# Patient Record
Sex: MALE | Race: WHITE | NOT HISPANIC OR LATINO | Employment: OTHER | URBAN - METROPOLITAN AREA
[De-identification: names, ages, dates, MRNs, and addresses within clinical notes are randomized per-mention and may not be internally consistent; named-entity substitution may affect disease eponyms.]

---

## 2017-01-05 ENCOUNTER — OFFICE VISIT (OUTPATIENT)
Dept: SURGERY | Facility: CLINIC | Age: 52
End: 2017-01-05

## 2017-01-05 VITALS
HEART RATE: 90 BPM | OXYGEN SATURATION: 97 % | BODY MASS INDEX: 28.37 KG/M2 | HEIGHT: 75 IN | SYSTOLIC BLOOD PRESSURE: 128 MMHG | WEIGHT: 228.2 LBS | DIASTOLIC BLOOD PRESSURE: 82 MMHG

## 2017-01-05 DIAGNOSIS — K42.9 UMBILICAL HERNIA WITHOUT OBSTRUCTION AND WITHOUT GANGRENE: Primary | ICD-10-CM

## 2017-01-05 PROCEDURE — 99242 OFF/OP CONSLTJ NEW/EST SF 20: CPT | Performed by: SURGERY

## 2017-01-05 RX ORDER — CEFAZOLIN SODIUM 2 G/100ML
2 INJECTION, SOLUTION INTRAVENOUS ONCE
Status: CANCELLED | OUTPATIENT
Start: 2017-01-05 | End: 2017-01-05

## 2017-01-05 NOTE — MR AVS SNAPSHOT
Zachary Gerard   1/5/2017 2:20 PM   Office Visit    Dept Phone:  [phone]   Encounter #:  [id number]    Provider:  Fernando Montes Jr., MD   Department:  [facility] GENERAL SURGERY                Your Full Care Plan              Your Updated Medication List          This list is accurate as of: 1/5/17  3:20 PM.  Always use your most recent med list.                allopurinol 300 MG tablet   Commonly known as:  ZYLOPRIM   Take 1 tablet by mouth Daily.       * amitriptyline 100 MG tablet   Commonly known as:  ELAVIL   Take 1 tablet by mouth every day at bedtime.       * amitriptyline 25 MG tablet   Commonly known as:  ELAVIL   Take 1 tablet by mouth daily.       cyclobenzaprine 5 MG tablet   Commonly known as:  FLEXERIL   Take 1 tablet by mouth Daily.       enalapril 2.5 MG tablet   Commonly known as:  VASOTEC   Take 1 tablet by mouth Daily.       * Notice:  This list has 2 medication(s) that are the same as other medications prescribed for you. Read the directions carefully, and ask your doctor or other care provider to review them with you.            We Performed the Following     Case Request     Follow anesthesia standing orders.     Obtain informed consent       You Were Diagnosed With        Codes Comments    Umbilical hernia without obstruction and without gangrene    -  Primary ICD-10-CM: K42.9  ICD-9-CM: 553.1       Instructions     None    Patient Instructions History      Upcoming Appointments     Visit Type Date Time Department    NEW PATIENT 1/5/2017  2:20 PM [facility]      [employer] Signup     [facility] [employer] allows you to send messages to your doctor, view your test results, renew your prescriptions, schedule appointments, and more. To sign up, go to [employer] and click on the Sign Up Now link in the New User? box. Enter your [employer] Activation Code exactly as it appears below along with the last four digits of your Social  "Security Number and your Date of Birth () to complete the sign-up process. If you do not sign up before the expiration date, you must request a new code.    [employer] Activation Code: [email or web address]  Expires: 2017  3:20 PM    If you have questions, you can email [email or web address] or call [phone] to talk to our [employer] staff. Remember, [employer] is NOT to be used for urgent needs. For medical emergencies, dial 911.               Other Info from Your Visit           Allergies     No Known Allergies      Vital Signs     Blood Pressure Pulse Height Weight Oxygen Saturation Body Mass Index    128/82 (BP Location: Left arm, Patient Position: Sitting, Cuff Size: Adult) 90 75\" (190.5 cm) 228 lb 3.2 oz (104 kg) 97% 28.52 kg/m2    Smoking Status                   Former Smoker           Problems and Diagnoses Noted     Abdominal hernia        "

## 2017-01-05 NOTE — PROGRESS NOTES
Subjective   Zachary Gerard is a 51 y.o. male who presents to the office in surgical consultation from Olvin Cotter MD for an umbilical hernia.    History of Present Illness     The patient has had a long-standing umbilical hernia that has recently gotten larger and increasingly symptomatic.  He has frequent symptoms of pain.  He has had no change in his bowel or bladder function.    Review of Systems   Constitutional: Negative for activity change, appetite change, fatigue and fever.   Respiratory: Negative for chest tightness and shortness of breath.    Cardiovascular: Negative for chest pain and palpitations.   Gastrointestinal: Positive for abdominal pain. Negative for blood in stool, constipation, diarrhea, nausea and vomiting.   Genitourinary: Negative for dysuria and flank pain.   Neurological: Negative for dizziness and light-headedness.   Hematological: Negative for adenopathy. Does not bruise/bleed easily.   Psychiatric/Behavioral: Negative for agitation and confusion.     Past Medical History   Diagnosis Date   • Arthritis    • Diverticulosis    • Hiatal hernia    • Hypertension    • IBS (irritable bowel syndrome)      Past Surgical History   Procedure Laterality Date   • Hernia repair     • Extensor tendon of forearm / wrist repair       Family History   Problem Relation Age of Onset   • No Known Problems Mother    • No Known Problems Father      Social History     Social History   • Marital status:      Spouse name: N/A   • Number of children: N/A   • Years of education: N/A     Occupational History   • Not on file.     Social History Main Topics   • Smoking status: Former Smoker   • Smokeless tobacco: Never Used   • Alcohol use Yes   • Drug use: No   • Sexual activity: Not on file     Other Topics Concern   • Not on file     Social History Narrative       Objective   Physical Exam   Constitutional: He is oriented to person, place, and time. He appears well-developed and well-nourished.   Non-toxic appearance.   Eyes: EOM are normal. No scleral icterus.   Pulmonary/Chest: Effort normal. No respiratory distress.   Abdominal: Soft. Normal appearance. There is no tenderness. A hernia is present. Hernia confirmed positive in the ventral area (moderately sized reducible umbilical hernia).   Neurological: He is alert and oriented to person, place, and time.   Skin: Skin is warm and dry.   Psychiatric: He has a normal mood and affect. His behavior is normal. Judgment and thought content normal.       Assessment/Plan       The encounter diagnosis was Umbilical hernia without obstruction and without gangrene.    The patient has a symptomatic umbilical hernia with a defect large enough to permit small bowel.  He has been scheduled for an umbilical hernia repair.  The patient understands the indications, alternatives, risks, and benefits of the procedure and wishes to proceed.

## 2017-01-16 ENCOUNTER — ANESTHESIA EVENT (OUTPATIENT)
Dept: PERIOP | Facility: HOSPITAL | Age: 52
End: 2017-01-16

## 2017-01-16 ENCOUNTER — ANESTHESIA (OUTPATIENT)
Dept: PERIOP | Facility: HOSPITAL | Age: 52
End: 2017-01-16

## 2017-01-16 ENCOUNTER — HOSPITAL ENCOUNTER (OUTPATIENT)
Facility: HOSPITAL | Age: 52
Setting detail: HOSPITAL OUTPATIENT SURGERY
Discharge: HOME | End: 2017-01-16
Attending: SURGERY | Admitting: SURGERY

## 2017-01-16 VITALS
BODY MASS INDEX: 28.1 KG/M2 | HEIGHT: 75 IN | RESPIRATION RATE: 16 BRPM | TEMPERATURE: 98 F | OXYGEN SATURATION: 92 % | SYSTOLIC BLOOD PRESSURE: 172 MMHG | HEART RATE: 71 BPM | WEIGHT: 226 LBS | DIASTOLIC BLOOD PRESSURE: 85 MMHG

## 2017-01-16 DIAGNOSIS — K42.9 UMBILICAL HERNIA WITHOUT OBSTRUCTION AND WITHOUT GANGRENE: ICD-10-CM

## 2017-01-16 LAB
ALBUMIN SERPL-MCNC: 4.1 G/DL (ref 3.5–5.2)
ALBUMIN/GLOB SERPL: 1.7 G/DL
ALP SERPL-CCNC: 78 U/L (ref 39–117)
ALT SERPL W P-5'-P-CCNC: 20 U/L (ref 1–41)
ANION GAP SERPL CALCULATED.3IONS-SCNC: 13.9 MMOL/L
AST SERPL-CCNC: 15 U/L (ref 1–40)
BASOPHILS # BLD AUTO: 0.01 10*3/MM3 (ref 0–0.2)
BASOPHILS NFR BLD AUTO: 0.1 % (ref 0–1.5)
BILIRUB SERPL-MCNC: 0.6 MG/DL (ref 0.1–1.2)
BUN BLD-MCNC: 14 MG/DL (ref 6–20)
BUN/CREAT SERPL: 16.1 (ref 7–25)
CALCIUM SPEC-SCNC: 9.4 MG/DL (ref 8.6–10.5)
CHLORIDE SERPL-SCNC: 101 MMOL/L (ref 98–107)
CHOLEST SERPL-MCNC: 145 MG/DL (ref 0–200)
CO2 SERPL-SCNC: 24.1 MMOL/L (ref 22–29)
CREAT BLD-MCNC: 0.87 MG/DL (ref 0.76–1.27)
DEPRECATED RDW RBC AUTO: 47 FL (ref 37–54)
EOSINOPHIL # BLD AUTO: 0.15 10*3/MM3 (ref 0–0.7)
EOSINOPHIL NFR BLD AUTO: 2 % (ref 0.3–6.2)
ERYTHROCYTE [DISTWIDTH] IN BLOOD BY AUTOMATED COUNT: 13.1 % (ref 11.5–14.5)
GFR SERPL CREATININE-BSD FRML MDRD: 92 ML/MIN/1.73
GLOBULIN UR ELPH-MCNC: 2.4 GM/DL
GLUCOSE BLD-MCNC: 107 MG/DL (ref 65–99)
HCT VFR BLD AUTO: 45.5 % (ref 40.4–52.2)
HDLC SERPL-MCNC: 41 MG/DL (ref 40–60)
HGB BLD-MCNC: 15.2 G/DL (ref 13.7–17.6)
IMM GRANULOCYTES # BLD: 0 10*3/MM3 (ref 0–0.03)
IMM GRANULOCYTES NFR BLD: 0 % (ref 0–0.5)
LDLC SERPL CALC-MCNC: 65 MG/DL (ref 0–100)
LDLC/HDLC SERPL: 1.58 {RATIO}
LYMPHOCYTES # BLD AUTO: 1.91 10*3/MM3 (ref 0.9–4.8)
LYMPHOCYTES NFR BLD AUTO: 26 % (ref 19.6–45.3)
MCH RBC QN AUTO: 33.4 PG (ref 27–32.7)
MCHC RBC AUTO-ENTMCNC: 33.4 G/DL (ref 32.6–36.4)
MCV RBC AUTO: 100 FL (ref 79.8–96.2)
MONOCYTES # BLD AUTO: 0.39 10*3/MM3 (ref 0.2–1.2)
MONOCYTES NFR BLD AUTO: 5.3 % (ref 5–12)
NEUTROPHILS # BLD AUTO: 4.9 10*3/MM3 (ref 1.9–8.1)
NEUTROPHILS NFR BLD AUTO: 66.6 % (ref 42.7–76)
PLATELET # BLD AUTO: 168 10*3/MM3 (ref 140–500)
PMV BLD AUTO: 10 FL (ref 6–12)
POTASSIUM BLD-SCNC: 4.7 MMOL/L (ref 3.5–5.2)
PROT SERPL-MCNC: 6.5 G/DL (ref 6–8.5)
RBC # BLD AUTO: 4.55 10*6/MM3 (ref 4.6–6)
SODIUM BLD-SCNC: 139 MMOL/L (ref 136–145)
TRIGL SERPL-MCNC: 197 MG/DL (ref 0–150)
URATE SERPL-MCNC: 5.2 MG/DL (ref 3.4–7)
VLDLC SERPL-MCNC: 39.4 MG/DL (ref 5–40)
WBC NRBC COR # BLD: 7.36 10*3/MM3 (ref 4.5–10.7)

## 2017-01-16 PROCEDURE — 25010000002 MIDAZOLAM PER 1 MG: Performed by: ANESTHESIOLOGY

## 2017-01-16 PROCEDURE — 84550 ASSAY OF BLOOD/URIC ACID: CPT | Performed by: INTERNAL MEDICINE

## 2017-01-16 PROCEDURE — C1781 MESH (IMPLANTABLE): HCPCS | Performed by: SURGERY

## 2017-01-16 PROCEDURE — 25010000002 HYDRALAZINE PER 20 MG: Performed by: NURSE ANESTHETIST, CERTIFIED REGISTERED

## 2017-01-16 PROCEDURE — 25010000002 KETOROLAC TROMETHAMINE PER 15 MG: Performed by: NURSE ANESTHETIST, CERTIFIED REGISTERED

## 2017-01-16 PROCEDURE — 25010000002 PROPOFOL 10 MG/ML EMULSION: Performed by: NURSE ANESTHETIST, CERTIFIED REGISTERED

## 2017-01-16 PROCEDURE — 93005 ELECTROCARDIOGRAM TRACING: CPT | Performed by: SURGERY

## 2017-01-16 PROCEDURE — 93010 ELECTROCARDIOGRAM REPORT: CPT | Performed by: INTERNAL MEDICINE

## 2017-01-16 PROCEDURE — 49585 PR REPAIR UMBILICAL HERN,5+Y/O,REDUC: CPT | Performed by: SURGERY

## 2017-01-16 PROCEDURE — 80053 COMPREHEN METABOLIC PANEL: CPT | Performed by: INTERNAL MEDICINE

## 2017-01-16 PROCEDURE — 25010000002 DEXAMETHASONE PER 1 MG: Performed by: NURSE ANESTHETIST, CERTIFIED REGISTERED

## 2017-01-16 PROCEDURE — 85025 COMPLETE CBC W/AUTO DIFF WBC: CPT | Performed by: SURGERY

## 2017-01-16 PROCEDURE — 25010000002 FENTANYL CITRATE (PF) 100 MCG/2ML SOLUTION: Performed by: NURSE ANESTHETIST, CERTIFIED REGISTERED

## 2017-01-16 PROCEDURE — 25010000003 CEFAZOLIN IN DEXTROSE 2-4 GM/100ML-% SOLUTION: Performed by: SURGERY

## 2017-01-16 PROCEDURE — 25010000002 HYDROMORPHONE PER 4 MG: Performed by: NURSE ANESTHETIST, CERTIFIED REGISTERED

## 2017-01-16 PROCEDURE — 80061 LIPID PANEL: CPT | Performed by: INTERNAL MEDICINE

## 2017-01-16 PROCEDURE — 25010000002 ONDANSETRON PER 1 MG: Performed by: NURSE ANESTHETIST, CERTIFIED REGISTERED

## 2017-01-16 DEVICE — VENTRALEX ST HERNIA PATCH
Type: IMPLANTABLE DEVICE | Status: FUNCTIONAL
Brand: VENTRALEX ST HERNIA PATCH

## 2017-01-16 RX ORDER — SODIUM CHLORIDE 0.9 % (FLUSH) 0.9 %
1-10 SYRINGE (ML) INJECTION AS NEEDED
Status: DISCONTINUED | OUTPATIENT
Start: 2017-01-16 | End: 2017-01-16 | Stop reason: HOSPADM

## 2017-01-16 RX ORDER — DEXAMETHASONE SODIUM PHOSPHATE 10 MG/ML
INJECTION INTRAMUSCULAR; INTRAVENOUS AS NEEDED
Status: DISCONTINUED | OUTPATIENT
Start: 2017-01-16 | End: 2017-01-16 | Stop reason: SURG

## 2017-01-16 RX ORDER — FLUMAZENIL 0.1 MG/ML
0.2 INJECTION INTRAVENOUS AS NEEDED
Status: DISCONTINUED | OUTPATIENT
Start: 2017-01-16 | End: 2017-01-16 | Stop reason: HOSPADM

## 2017-01-16 RX ORDER — SODIUM CHLORIDE, SODIUM LACTATE, POTASSIUM CHLORIDE, CALCIUM CHLORIDE 600; 310; 30; 20 MG/100ML; MG/100ML; MG/100ML; MG/100ML
9 INJECTION, SOLUTION INTRAVENOUS CONTINUOUS
Status: DISCONTINUED | OUTPATIENT
Start: 2017-01-16 | End: 2017-01-16 | Stop reason: HOSPADM

## 2017-01-16 RX ORDER — PROMETHAZINE HYDROCHLORIDE 25 MG/ML
12.5 INJECTION, SOLUTION INTRAMUSCULAR; INTRAVENOUS ONCE AS NEEDED
Status: DISCONTINUED | OUTPATIENT
Start: 2017-01-16 | End: 2017-01-16 | Stop reason: HOSPADM

## 2017-01-16 RX ORDER — BUPIVACAINE HYDROCHLORIDE AND EPINEPHRINE 5; 5 MG/ML; UG/ML
INJECTION, SOLUTION PERINEURAL AS NEEDED
Status: DISCONTINUED | OUTPATIENT
Start: 2017-01-16 | End: 2017-01-16 | Stop reason: HOSPADM

## 2017-01-16 RX ORDER — PROMETHAZINE HYDROCHLORIDE 25 MG/1
12.5 TABLET ORAL ONCE AS NEEDED
Status: DISCONTINUED | OUTPATIENT
Start: 2017-01-16 | End: 2017-01-16 | Stop reason: HOSPADM

## 2017-01-16 RX ORDER — FAMOTIDINE 10 MG/ML
20 INJECTION, SOLUTION INTRAVENOUS ONCE
Status: COMPLETED | OUTPATIENT
Start: 2017-01-16 | End: 2017-01-16

## 2017-01-16 RX ORDER — HYDRALAZINE HYDROCHLORIDE 20 MG/ML
5 INJECTION INTRAMUSCULAR; INTRAVENOUS
Status: DISCONTINUED | OUTPATIENT
Start: 2017-01-16 | End: 2017-01-16 | Stop reason: HOSPADM

## 2017-01-16 RX ORDER — FENTANYL CITRATE 50 UG/ML
INJECTION, SOLUTION INTRAMUSCULAR; INTRAVENOUS AS NEEDED
Status: DISCONTINUED | OUTPATIENT
Start: 2017-01-16 | End: 2017-01-16 | Stop reason: SURG

## 2017-01-16 RX ORDER — ONDANSETRON 2 MG/ML
4 INJECTION INTRAMUSCULAR; INTRAVENOUS ONCE AS NEEDED
Status: DISCONTINUED | OUTPATIENT
Start: 2017-01-16 | End: 2017-01-16 | Stop reason: HOSPADM

## 2017-01-16 RX ORDER — ONDANSETRON 2 MG/ML
INJECTION INTRAMUSCULAR; INTRAVENOUS AS NEEDED
Status: DISCONTINUED | OUTPATIENT
Start: 2017-01-16 | End: 2017-01-16 | Stop reason: SURG

## 2017-01-16 RX ORDER — DIPHENHYDRAMINE HYDROCHLORIDE 50 MG/ML
12.5 INJECTION INTRAMUSCULAR; INTRAVENOUS
Status: DISCONTINUED | OUTPATIENT
Start: 2017-01-16 | End: 2017-01-16 | Stop reason: HOSPADM

## 2017-01-16 RX ORDER — FENTANYL CITRATE 50 UG/ML
50 INJECTION, SOLUTION INTRAMUSCULAR; INTRAVENOUS
Status: DISCONTINUED | OUTPATIENT
Start: 2017-01-16 | End: 2017-01-16 | Stop reason: HOSPADM

## 2017-01-16 RX ORDER — OXYCODONE AND ACETAMINOPHEN 7.5; 325 MG/1; MG/1
1 TABLET ORAL ONCE AS NEEDED
Status: COMPLETED | OUTPATIENT
Start: 2017-01-16 | End: 2017-01-16

## 2017-01-16 RX ORDER — MIDAZOLAM HYDROCHLORIDE 1 MG/ML
1 INJECTION INTRAMUSCULAR; INTRAVENOUS
Status: DISCONTINUED | OUTPATIENT
Start: 2017-01-16 | End: 2017-01-16 | Stop reason: HOSPADM

## 2017-01-16 RX ORDER — PROPOFOL 10 MG/ML
VIAL (ML) INTRAVENOUS AS NEEDED
Status: DISCONTINUED | OUTPATIENT
Start: 2017-01-16 | End: 2017-01-16 | Stop reason: SURG

## 2017-01-16 RX ORDER — HYDROMORPHONE HYDROCHLORIDE 1 MG/ML
0.5 INJECTION, SOLUTION INTRAMUSCULAR; INTRAVENOUS; SUBCUTANEOUS
Status: DISCONTINUED | OUTPATIENT
Start: 2017-01-16 | End: 2017-01-16 | Stop reason: HOSPADM

## 2017-01-16 RX ORDER — HYDROMORPHONE HYDROCHLORIDE 1 MG/ML
0.25 INJECTION, SOLUTION INTRAMUSCULAR; INTRAVENOUS; SUBCUTANEOUS
Status: DISCONTINUED | OUTPATIENT
Start: 2017-01-16 | End: 2017-01-16 | Stop reason: HOSPADM

## 2017-01-16 RX ORDER — PROMETHAZINE HYDROCHLORIDE 25 MG/1
25 SUPPOSITORY RECTAL ONCE AS NEEDED
Status: DISCONTINUED | OUTPATIENT
Start: 2017-01-16 | End: 2017-01-16 | Stop reason: HOSPADM

## 2017-01-16 RX ORDER — HYDROCODONE BITARTRATE AND ACETAMINOPHEN 7.5; 325 MG/1; MG/1
1 TABLET ORAL ONCE AS NEEDED
Status: DISCONTINUED | OUTPATIENT
Start: 2017-01-16 | End: 2017-01-16 | Stop reason: HOSPADM

## 2017-01-16 RX ORDER — OXYCODONE HYDROCHLORIDE AND ACETAMINOPHEN 5; 325 MG/1; MG/1
TABLET ORAL
Qty: 40 TABLET | Refills: 0 | Status: SHIPPED | OUTPATIENT
Start: 2017-01-16 | End: 2017-03-07

## 2017-01-16 RX ORDER — CEFAZOLIN SODIUM 2 G/100ML
2 INJECTION, SOLUTION INTRAVENOUS ONCE
Status: COMPLETED | OUTPATIENT
Start: 2017-01-16 | End: 2017-01-16

## 2017-01-16 RX ORDER — ENALAPRIL MALEATE 2.5 MG/1
TABLET ORAL
Qty: 90 TABLET | Refills: 0 | OUTPATIENT
Start: 2017-01-16

## 2017-01-16 RX ORDER — PROMETHAZINE HYDROCHLORIDE 25 MG/1
25 TABLET ORAL ONCE AS NEEDED
Status: DISCONTINUED | OUTPATIENT
Start: 2017-01-16 | End: 2017-01-16 | Stop reason: HOSPADM

## 2017-01-16 RX ORDER — KETOROLAC TROMETHAMINE 30 MG/ML
INJECTION, SOLUTION INTRAMUSCULAR; INTRAVENOUS AS NEEDED
Status: DISCONTINUED | OUTPATIENT
Start: 2017-01-16 | End: 2017-01-16 | Stop reason: SURG

## 2017-01-16 RX ORDER — LABETALOL HYDROCHLORIDE 5 MG/ML
5 INJECTION, SOLUTION INTRAVENOUS
Status: DISCONTINUED | OUTPATIENT
Start: 2017-01-16 | End: 2017-01-16 | Stop reason: HOSPADM

## 2017-01-16 RX ORDER — MIDAZOLAM HYDROCHLORIDE 1 MG/ML
2 INJECTION INTRAMUSCULAR; INTRAVENOUS
Status: DISCONTINUED | OUTPATIENT
Start: 2017-01-16 | End: 2017-01-16 | Stop reason: HOSPADM

## 2017-01-16 RX ORDER — LIDOCAINE HYDROCHLORIDE 20 MG/ML
INJECTION, SOLUTION INFILTRATION; PERINEURAL AS NEEDED
Status: DISCONTINUED | OUTPATIENT
Start: 2017-01-16 | End: 2017-01-16 | Stop reason: SURG

## 2017-01-16 RX ORDER — NALOXONE HCL 0.4 MG/ML
0.2 VIAL (ML) INJECTION AS NEEDED
Status: DISCONTINUED | OUTPATIENT
Start: 2017-01-16 | End: 2017-01-16 | Stop reason: HOSPADM

## 2017-01-16 RX ADMIN — DEXAMETHASONE SODIUM PHOSPHATE 8 MG: 10 INJECTION INTRAMUSCULAR; INTRAVENOUS at 14:55

## 2017-01-16 RX ADMIN — OXYCODONE HYDROCHLORIDE AND ACETAMINOPHEN 1 TABLET: 7.5; 325 TABLET ORAL at 16:09

## 2017-01-16 RX ADMIN — HYDROMORPHONE HYDROCHLORIDE 0.5 MG: 1 INJECTION, SOLUTION INTRAMUSCULAR; INTRAVENOUS; SUBCUTANEOUS at 15:41

## 2017-01-16 RX ADMIN — MIDAZOLAM 2 MG: 1 INJECTION INTRAMUSCULAR; INTRAVENOUS at 13:27

## 2017-01-16 RX ADMIN — FENTANYL CITRATE 50 MCG: 50 INJECTION INTRAMUSCULAR; INTRAVENOUS at 15:46

## 2017-01-16 RX ADMIN — PROPOFOL 200 MG: 10 INJECTION, EMULSION INTRAVENOUS at 14:43

## 2017-01-16 RX ADMIN — PROPOFOL 50 MG: 10 INJECTION, EMULSION INTRAVENOUS at 15:05

## 2017-01-16 RX ADMIN — HYDROMORPHONE HYDROCHLORIDE 0.5 MG: 1 INJECTION, SOLUTION INTRAMUSCULAR; INTRAVENOUS; SUBCUTANEOUS at 16:01

## 2017-01-16 RX ADMIN — SODIUM CHLORIDE, POTASSIUM CHLORIDE, SODIUM LACTATE AND CALCIUM CHLORIDE 9 ML/HR: 600; 310; 30; 20 INJECTION, SOLUTION INTRAVENOUS at 13:18

## 2017-01-16 RX ADMIN — LIDOCAINE HYDROCHLORIDE 60 MG: 20 INJECTION, SOLUTION INFILTRATION; PERINEURAL at 14:43

## 2017-01-16 RX ADMIN — FENTANYL CITRATE 50 MCG: 50 INJECTION INTRAMUSCULAR; INTRAVENOUS at 16:02

## 2017-01-16 RX ADMIN — HYDRALAZINE HYDROCHLORIDE 5 MG: 20 INJECTION INTRAMUSCULAR; INTRAVENOUS at 15:57

## 2017-01-16 RX ADMIN — FENTANYL CITRATE 50 MCG: 50 INJECTION INTRAMUSCULAR; INTRAVENOUS at 15:05

## 2017-01-16 RX ADMIN — FAMOTIDINE 20 MG: 10 INJECTION, SOLUTION INTRAVENOUS at 13:17

## 2017-01-16 RX ADMIN — CEFAZOLIN SODIUM 2 G: 2 INJECTION, SOLUTION INTRAVENOUS at 14:42

## 2017-01-16 RX ADMIN — KETOROLAC TROMETHAMINE 30 MG: 30 INJECTION, SOLUTION INTRAMUSCULAR; INTRAVENOUS at 14:55

## 2017-01-16 RX ADMIN — FENTANYL CITRATE 50 MCG: 50 INJECTION INTRAMUSCULAR; INTRAVENOUS at 14:47

## 2017-01-16 RX ADMIN — HYDROMORPHONE HYDROCHLORIDE 0.5 MG: 1 INJECTION, SOLUTION INTRAMUSCULAR; INTRAVENOUS; SUBCUTANEOUS at 16:20

## 2017-01-16 RX ADMIN — MIDAZOLAM 2 MG: 1 INJECTION INTRAMUSCULAR; INTRAVENOUS at 13:17

## 2017-01-16 RX ADMIN — ONDANSETRON 4 MG: 2 INJECTION INTRAMUSCULAR; INTRAVENOUS at 14:55

## 2017-01-16 NOTE — ANESTHESIA PROCEDURE NOTES
Airway  Urgency: elective    Date/Time: 1/16/2017 2:44 PM  Airway not difficult    General Information and Staff    Patient location during procedure: OR  Anesthesiologist: GABBY POWER  CRNA: JONI COLEMAN    Indications and Patient Condition  Indications for airway management: airway protection    Preoxygenated: yes  MILS maintained throughout  Mask difficulty assessment: 1 - vent by mask    Final Airway Details  Final airway type: supraglottic airway      Successful airway: unique  Size 5    Number of attempts at approach: 1

## 2017-01-16 NOTE — PLAN OF CARE
Problem: Patient Care Overview (Adult)  Goal: Plan of Care Review  Outcome: Outcome(s) achieved Date Met:  01/16/17 01/16/17 7571   Coping/Psychosocial Response Interventions   Plan Of Care Reviewed With patient;spouse   Patient Care Overview   Progress improving   Outcome Evaluation   Outcome Summary/Follow up Plan ready for discharge

## 2017-01-16 NOTE — OP NOTE
Surgeon: Fernando Montes Jr., M.D.    Assistant: None    Pre-Operative Diagnosis: Umbilical hernia    Post-Operative Diagnosis: Umbilical hernia    Procedure Performed: Umbilical hernia repair with mesh    Indications:     The patient is a pleasant 51-year-old white male who has a bulge at the umbilicus that is getting larger and increasingly symptomatic.  He was diagnosed with an umbilical hernia.  He presents for umbilical hernia repair with mesh.  The patient understands the indications, alternatives, risks, and benefits of the procedure and wishes to proceed.    Procedure:     The patient was identified and taken to the operating room where he was placed in the supine position on the operating table.  Monitors were placed and he underwent general endotracheal anesthesia and was appropriately monitored by the anesthesia personnel.  The abdomen and the sonal-umbilical region was prepped and draped in the standard surgical fashion.  A periumbilical incision was made and carried through the skin into the subcutaneous tissue.  The subcutaneous tissue was divided using Bovie electrocautery down to the level of the fascia and the hernia was surrounded.  The umbilicus was then taken off the hernia sac using Bovie electrocautery.  The hernia sac was freed from all subcutaneous attachments all the way down to the fascial defect.  The hernia was freed at the fascial edge using Bovie electrocautery and completely reduced.  The hernia defect was about 3 cm in size.  The preperitoneal space was dissected to permit the mesh.  A piece of Ventralex ST mesh, size medium, was selected and placed in a subfascial position in the preperitoneal space.  The fascia was then closed with 0 PDS sutures in an interrupted fashion grasping the legs of the mesh within the closure to secure the mesh in the proper position.  The entire area was then infiltrated with 0.5% Marcaine with epinephrine.  The umbilicus was tacked down to the fascia  using 3-0 Vicryls suture in an interrupted fashion.  The subcutaneous tissue was then closed with 3-0 Vicryls suture in a running fashion.  The skin was then closed with a 4-0 Monocryl in a subcuticular fashion followed by benzoin and Steri-Strips.  A sterile dressing was applied.  The sponge, needle, and instrument counts were correct at the end the case.  The patient tolerated procedure well and was transferred to the recovery room in stable condition.

## 2017-01-16 NOTE — ANESTHESIA PREPROCEDURE EVALUATION
Anesthesia Evaluation      Airway   Mallampati: I  TM distance: <3 FB  Neck ROM: full  no difficulty expected  Dental - normal exam     Pulmonary - normal exam   Cardiovascular - normal exam  (+) hypertension,     Neuro/Psych  (+) psychiatric history Depression,    GI/Hepatic/Renal/Endo    (+)  hiatal hernia,     Musculoskeletal     Abdominal  - normal exam    Bowel sounds: normal.   Substance History      OB/GYN          Other                             Anesthesia Plan    ASA 2     general     Anesthetic plan and risks discussed with patient.

## 2017-01-16 NOTE — PLAN OF CARE
Problem: Patient Care Overview (Adult)  Goal: Adult Individualization and Mutuality  Outcome: Outcome(s) achieved Date Met:  01/16/17

## 2017-01-16 NOTE — PLAN OF CARE
Problem: Patient Care Overview (Adult)  Goal: Discharge Needs Assessment  Outcome: Outcome(s) achieved Date Met:  01/16/17 01/16/17 3951   Discharge Needs Assessment   Concerns To Be Addressed no discharge needs identified

## 2017-01-16 NOTE — ANESTHESIA POSTPROCEDURE EVALUATION
Patient: Zachary Gerard    Procedure Summary     Date Anesthesia Start Anesthesia Stop Room / Location    01/16/17 1440 1542  [facility]       Procedure Diagnosis Surgeon Provider    UMBILICAL HERNIA REPAIR (N/A Abdomen) Umbilical hernia without obstruction and without gangrene  (Umbilical hernia without obstruction and without gangrene [K42.9]) MD Ifeoma Mccarthy Jr., MD          Anesthesia Type: general  Last vitals  /90 (01/16/17 1635)    Temp 36.7 °C (98 °F) (01/16/17 1615)    Pulse 72 (01/16/17 1635)   Resp 16 (01/16/17 1635)    SpO2 95 % (01/16/17 1635)      Post Anesthesia Care and Evaluation    Patient location during evaluation: PHASE II  Patient participation: complete - patient participated  Level of consciousness: awake and alert  Pain management: adequate  Airway patency: patent  Anesthetic complications: No anesthetic complications  PONV Status: none  Cardiovascular status: acceptable  Respiratory status: acceptable  Hydration status: acceptable

## 2017-01-16 NOTE — IP AVS SNAPSHOT
AFTER VISIT SUMMARY             Zachary Gerard           About your hospitalization     You were admitted on:  January 16, 2017 You last received care in the:  [facility] MAIN OR       Procedures & Surgeries      Procedure(s) (LRB):  UMBILICAL HERNIA REPAIR (N/A)     1/16/2017     Surgeon(s):  Fernando Montes Jr., MD  -------------------      Medications    If you or your caregiver advised us that you are currently taking a medication and that medication is marked below as “Resume”, this simply indicates that we have reviewed those medications to make sure our new therapy recommendations do not interfere.  If you have concerns about medications other than those new ones which we are prescribing today, please consult the physician who prescribed them (or your primary physician).  Our review of your home medications is not meant to indicate that we are directing their use.             Your Medications      START taking these medications     oxyCODONE-acetaminophen 5-325 MG per tablet   1-2 q 6 hrs prn pain   Commonly known as:  PERCOCET             CHANGE how you take these medications     amitriptyline 100 MG tablet   Take 1 tablet by mouth every day at bedtime.   According to our records, you may have been taking this medication differently.   Commonly known as:  ELAVIL   What changed:    - how much to take  - how to take this  - when to take this  - reasons to take this  - additional instructions           amitriptyline 25 MG tablet   Take 1 tablet by mouth daily.   According to our records, you may have been taking this medication differently.   Commonly known as:  ELAVIL   What changed:    - how much to take  - how to take this  - when to take this  - reasons to take this  - additional instructions             CONTINUE taking these medications     allopurinol 300 MG tablet   Take 1 tablet by mouth Daily.   Commonly known as:  ZYLOPRIM           enalapril 2.5 MG tablet   Take 1 tablet by mouth  Daily.   Commonly known as:  VASOTEC                Where to Get Your Medications      You can get these medications from any pharmacy     Bring a paper prescription for each of these medications     oxyCODONE-acetaminophen 5-325 MG per tablet                  Your Medications      Your Medication List           Morning Noon Evening Bedtime As Needed    allopurinol 300 MG tablet   Take 1 tablet by mouth Daily.   Commonly known as:  ZYLOPRIM                                * amitriptyline 100 MG tablet   Take 1 tablet by mouth every day at bedtime.   Commonly known as:  ELAVIL                                * amitriptyline 25 MG tablet   Take 1 tablet by mouth daily.   Commonly known as:  ELAVIL                                enalapril 2.5 MG tablet   Take 1 tablet by mouth Daily.   Commonly known as:  VASOTEC                                oxyCODONE-acetaminophen 5-325 MG per tablet   1-2 q 6 hrs prn pain   Commonly known as:  PERCOCET                                * Notice:  This list has 2 medication(s) that are the same as other medications prescribed for you. Read the directions carefully, and ask your doctor or other care provider to review them with you.             Instructions for After Discharge        Activity Instructions     Discharge Activity       1.  No heavy lifting for one month.  2.  May shower.  3.  Remove dressing in 2 days.  4.  When recovering at home, use an ice pack over the incision for the first 48 hours.  Place it on for 20 minutes and then off for 20 minutes at times when resting.  Do not use this cycle when sleeping at night.  5.  Use milk of magnesia for pain medication related constipation.               Discharge Instructions         You had one Percocet for pain at 4:09 pm.        Dr. Fernando Montes and Dr. Gaston Hutchinson  [address] [id number] [phone]    Discharge Instructions for Hernia Surgery      1. Go home, rest and take it easy today; however,  you should get up and move about several times today to reduce the risk of developing a clot in your legs.      2. You may experience some dizziness or memory loss from the anesthesia.  This may last for the next 24 hours.  Someone should plan on staying with you for the first 24 hours for your safety.    3. Do not make any important legal decisions or sign any legal papers for the next 24 hours.      4. Eat and drink lightly today.  Start off with liquids, jello, soup, crackers or other bland foods at first. You may advance your diet tomorrow as tolerated as long as you do not experience any nausea or vomiting.     5. You may remove your outer dressings in 2 days.  The white tapes called steri-strips should stay in place.  They will fall off on their own in 1-2 weeks.  Do not worry if they come off sooner.      6. You may notice some bleeding/drainage on your outer dressings. A little bloody drainage is normal. If the bleeding/drainage is such that the bandage cannot absorb it, remove the dressing, apply clean gauze and apply firm pressure for a full 15 minutes.  If the bleeding continues, please call me.    7. You may shower tomorrow.  No tub baths until your incisions are completely healed.     8. No lifting > 20 lbs. until you are seen at your follow-up visit.         9. You have received a prescription for a narcotic pain medicine, as you will have some pain following surgery.   You will not be totally pain free, but your pain medicine should make the pain tolerable.  Please take your pain medicine as prescribed and always take your pills with food to prevent nausea. If you are having severe pain that cannot be controlled by the pain medicine, please contact me.      10. If you had a laparoscopic surgery, it is not unusual to experience pain/discomfort in your shoulders or under your ribs after surgery.  It is from the gas used during the laparoscopic procedure and usually lasts 1-3 days.  The prescription pain  medicine is used to treat the surgical pain and does not typically alleviate this “gassy” pain.     11. No driving for 24 hours and for as long as you are taking your prescription pain medicine.      12. You will need to call the office at [phone] to schedule a follow-up appointment in 2 weeks.           13. Remember to contact me for any of the following:    • Fever > 101 degrees  • Severe pain that cannot be controlled by taking your pain pills  • Severe nausea or vomiting   • Significant bleeding of your incisions  • Drainage that has a bad smell or is yellow or green in appearance  • Any other questions or concerns        Additional Instruction for Inguinal Hernia Patients Only    1. If you did not urinate at the hospital after your surgery or if you feel the need to urinate and cannot, this will necessitate a return to the Emergency Room for placement of a urinary catheter.  You should also notify me as well.  As a rule, you should be able to empty your bladder within 4-6 hours after discharge from the hospital.      You may notice some scrotal bruising and/or swelling. A scrotal support or briefs as well as ice packs may be used to alleviate discomfort    Discharge References/Attachments     ACETAMINOPHEN; OXYCODONE TABLETS (ENGLISH)       Follow-ups for After Discharge        Follow-up Information     Follow up with Olvin Cotter MD .    Specialty:  Internal Medicine    Contact information:    [address] [facility] [address]  [place] [id number] [phone]          Follow up with Fernando Montes Jr., MD. Schedule an appointment as soon as possible for a visit in 2 week(s).    Specialty:  General Surgery    Contact information:    [address] [facility] [address]  [place] [id number] [phone]        Referrals and Follow-ups to Schedule     Return to Clinic for Follow Up    As directed    Dr. Montes [phone].   Follow Up Details:  2 weeks.             Actixt Signup     [facility] [employer] allows you to send  messages to your doctor, view your test results, renew your prescriptions, schedule appointments, and more. To sign up, go to [email or web address] and click on the Sign Up Now link in the New User? box. Enter your [employer] Activation Code exactly as it appears below along with the last four digits of your Social Security Number and your Date of Birth () to complete the sign-up process. If you do not sign up before the expiration date, you must request a new code.    [employer] Activation Code: [email or web address]  Expires: 2017  3:20 PM    If you have questions, you can email [email or web address] or call [phone] to talk to our [employer] staff. Remember, [employer] is NOT to be used for urgent needs. For medical emergencies, dial 911.           Summary of Your Hospitalization        Reason for Hospitalization     Your primary diagnosis was:  Not on File      Care Providers     Provider Service Role Specialty    Fernando Montes Jr., MD -- Attending Provider General Surgery    Fernando Montes Jr., MD -- Surgeon  General Surgery      Your Allergies  Date Reviewed: 2017    No active allergies      Patient Belongings Returned     Document Return of Belongings Flowsheet     Were the patient bedside belongings sent home?   --   Belongings Retrieved from Security & Sent Home   --    Belongings Sent to Safe   --   Medications Retrieved from Pharmacy & Sent Home   --              More Information      Acetaminophen; Oxycodone tablets  What is this medicine?  ACETAMINOPHEN; OXYCODONE (a set a DIANA bri fen; ox i KOE done) is a pain reliever. It is used to treat moderate to severe pain.  This medicine may be used for other purposes; ask your health care provider or pharmacist if you have questions.  What should I tell my health care provider before I take this medicine?  They need to know if you have any of these conditions:  -brain tumor  -Crohn's disease, inflammatory bowel disease, or ulcerative colitis  -drug  abuse or addiction  -head injury  -heart or circulation problems  -if you often drink alcohol  -kidney disease or problems going to the bathroom  -liver disease  -lung disease, asthma, or breathing problems  -an unusual or allergic reaction to acetaminophen, oxycodone, other opioid analgesics, other medicines, foods, dyes, or preservatives  -pregnant or trying to get pregnant  -breast-feeding  How should I use this medicine?  Take this medicine by mouth with a full glass of water. Follow the directions on the prescription label. You can take it with or without food. If it upsets your stomach, take it with food. Take your medicine at regular intervals. Do not take it more often than directed.  Talk to your pediatrician regarding the use of this medicine in children. Special care may be needed.  Patients over 65 years old may have a stronger reaction and need a smaller dose.  Overdosage: If you think you have taken too much of this medicine contact a poison control center or emergency room at once.  NOTE: This medicine is only for you. Do not share this medicine with others.  What if I miss a dose?  If you miss a dose, take it as soon as you can. If it is almost time for your next dose, take only that dose. Do not take double or extra doses.  What may interact with this medicine?  -alcohol  -antihistamines  -barbiturates like amobarbital, butalbital, butabarbital, methohexital, pentobarbital, phenobarbital, thiopental, and secobarbital  -benztropine  -drugs for bladder problems like solifenacin, trospium, oxybutynin, tolterodine, hyoscyamine, and methscopolamine  -drugs for breathing problems like ipratropium and tiotropium  -drugs for certain stomach or intestine problems like propantheline, homatropine methylbromide, glycopyrrolate, atropine, belladonna, and dicyclomine  -general anesthetics like etomidate, ketamine, nitrous oxide, propofol, desflurane, enflurane, halothane, isoflurane, and sevoflurane  -medicines  for depression, anxiety, or psychotic disturbances  -medicines for sleep  -muscle relaxants  -naltrexone  -narcotic medicines (opiates) for pain  -phenothiazines like perphenazine, thioridazine, chlorpromazine, mesoridazine, fluphenazine, prochlorperazine, promazine, and trifluoperazine  -scopolamine  -tramadol  -trihexyphenidyl  This list may not describe all possible interactions. Give your health care provider a list of all the medicines, herbs, non-prescription drugs, or dietary supplements you use. Also tell them if you smoke, drink alcohol, or use illegal drugs. Some items may interact with your medicine.  What should I watch for while using this medicine?  Tell your doctor or health care professional if your pain does not go away, if it gets worse, or if you have new or a different type of pain. You may develop tolerance to the medicine. Tolerance means that you will need a higher dose of the medication for pain relief. Tolerance is normal and is expected if you take this medicine for a long time.  Do not suddenly stop taking your medicine because you may develop a severe reaction. Your body becomes used to the medicine. This does NOT mean you are addicted. Addiction is a behavior related to getting and using a drug for a non-medical reason. If you have pain, you have a medical reason to take pain medicine. Your doctor will tell you how much medicine to take. If your doctor wants you to stop the medicine, the dose will be slowly lowered over time to avoid any side effects.  You may get drowsy or dizzy. Do not drive, use machinery, or do anything that needs mental alertness until you know how this medicine affects you. Do not stand or sit up quickly, especially if you are an older patient. This reduces the risk of dizzy or fainting spells. Alcohol may interfere with the effect of this medicine. Avoid alcoholic drinks.  There are different types of narcotic medicines (opiates) for pain. If you take more than  one type at the same time, you may have more side effects. Give your health care provider a list of all medicines you use. Your doctor will tell you how much medicine to take. Do not take more medicine than directed. Call emergency for help if you have problems breathing.  The medicine will cause constipation. Try to have a bowel movement at least every 2 to 3 days. If you do not have a bowel movement for 3 days, call your doctor or health care professional.  Do not take Tylenol (acetaminophen) or medicines that have acetaminophen with this medicine. Too much acetaminophen can be very dangerous. Many nonprescription medicines contain acetaminophen. Always read the labels carefully to avoid taking more acetaminophen.  What side effects may I notice from receiving this medicine?  Side effects that you should report to your doctor or health care professional as soon as possible:  -allergic reactions like skin rash, itching or hives, swelling of the face, lips, or tongue  -breathing difficulties, wheezing  -confusion  -light headedness or fainting spells  -severe stomach pain  -unusually weak or tired  -yellowing of the skin or the whites of the eyes  Side effects that usually do not require medical attention (report to your doctor or health care professional if they continue or are bothersome):  -dizziness  -drowsiness  -nausea  -vomiting  This list may not describe all possible side effects. Call your doctor for medical advice about side effects. You may report side effects to FDA at 8-574-FDA-7753.  Where should I keep my medicine?  Keep out of the reach of children. This medicine can be abused. Keep your medicine in a safe place to protect it from theft. Do not share this medicine with anyone. Selling or giving away this medicine is dangerous and against the law.  This medicine may cause accidental overdose and death if it taken by other adults, children, or pets. Mix any unused medicine with a substance like cat  litter or coffee grounds. Then throw the medicine away in a sealed container like a sealed bag or a coffee can with a lid. Do not use the medicine after the expiration date.  Store at room temperature between 20 and 25 degrees C (68 and 77 degrees F).  NOTE: This sheet is a summary. It may not cover all possible information. If you have questions about this medicine, talk to your doctor, pharmacist, or health care provider.     © 2016, Elsevier/Gold Standard. (2015-11-18 15:18:46)         PREVENTING SURGICAL SITE INFECTIONS     Surgical Site Infections FAQs  What is a Surgical Site Infection (SSI)?  A surgical site infection is an infection that occurs after surgery in the part of the body where the surgery took place. Most patients who have surgery do not develop an infection. However, infections develop in about 1 to 3 out of every 100 patients who have surgery.  Some of the common symptoms of a surgical site infection are:  · Redness and pain around the area where you had surgery  · Drainage of cloudy fluid from your surgical wound  · Fever  Can SSIs be treated?  Yes. Most surgical site infections can be treated with antibiotics. The antibiotic given to you depends on the bacteria (germs) causing the infection. Sometimes patients with SSIs also need another surgery to treat the infection.  What are some of the things that hospitals are doing to prevent SSIs?  To prevent SSIs, doctors, nurses, and other healthcare providers:  · Clean their hands and arms up to their elbows with an antiseptic agent just before the surgery.  · Clean their hands with soap and water or an alcohol-based hand rub before and after caring for each patient.  · May remove some of your hair immediately before your surgery using electric clippers if the hair is in the same area where the procedure will occur. They should not shave you with a razor.  · Wear special hair covers, masks, gowns, and gloves during surgery to keep the surgery area  clean.  · Give you antibiotics before your surgery starts. In most cases, you should get antibiotics within 60 minutes before the surgery starts and the antibiotics should be stopped within 24 hours after surgery.  · Clean the skin at the site of your surgery with a special soap that kills germs.  What can I do to help prevent SSIs?  Before your surgery:  · Tell your doctor about other medical problems you may have. Health problems such as allergies, diabetes, and obesity could affect your surgery and your treatment.  · Quit smoking. Patients who smoke get more infections. Talk to your doctor about how you can quit before your surgery.  · Do not shave near where you will have surgery. Shaving with a razor can irritate your skin and make it easier to develop an infection.  At the time of your surgery:  · Speak up if someone tries to shave you with a razor before surgery. Ask why you need to be shaved and talk with your surgeon if you have any concerns.  · Ask if you will get antibiotics before surgery.  After your surgery:  · Make sure that your healthcare providers clean their hands before examining you, either with soap and water or an alcohol-based hand rub.    If you do not see your providers clean their hands, please ask them to do so.  · Family and friends who visit you should not touch the surgical wound or dressings.  · Family and friends should clean their hands with soap and water or an alcohol-based hand rub before and after visiting you. If you do not see them clean their hands, ask them to clean their hands.  What do I need to do when I go home from the hospital?  · Before you go home, your doctor or nurse should explain everything you need to know about taking care of your wound. Make sure you understand how to care for your wound before you leave the hospital.  · Always clean your hands before and after caring for your wound.  · Before you go home, make sure you know who to contact if you have  questions or problems after you get home.  · If you have any symptoms of an infection, such as redness and pain at the surgery site, drainage, or fever, call your doctor immediately.  If you have additional questions, please ask your doctor or nurse.  Developed and co-sponsored by The Society for Healthcare Epidemiology of [place] (SHEA); Infectious Diseases Society of [place] (IDSA); American Hospital Association; Association for Professionals in Infection Control and Epidemiology (APIC); Centers for Disease Control and Prevention (CDC); and The Joint Commission.     This information is not intended to replace advice given to you by your health care provider. Make sure you discuss any questions you have with your health care provider.     Document Released: 12/23/2014 Document Revised: 01/08/2016 Document Reviewed: 03/02/2016  Digital [employer] Dialog Interactive Patient Education ©2016 Elsevier Inc.             SYMPTOMS OF A STROKE    Call 911 or have someone take you to the Emergency Department if you have any of the following:    · Sudden numbness or weakness of your face, arm or leg especially on one side of the body  · Sudden confusion, diffiiculty speaking or trouble understanding   · Changes in your vision or loss of sight in one eye  · Sudden severe headache with no known cause  · sudden dizziness, trouble walking, loss of balance or coordination    It is important to seek emergency care right away if you have further stroke symptoms. If you get emergency help quickly, the powerful clot-dissolving medicines can reduce the disabilities caused by a stroke.     For more information:    American Stroke Association  3-694-6-STROKE  www.strokeassociation.org           IF YOU SMOKE OR USE TOBACCO PLEASE READ THE FOLLOWING:    Why is smoking bad for me?  Smoking increases the risk of heart disease, lung disease, vascular disease, stroke, and cancer.     If you smoke, STOP!    If you would like more information on quitting smoking,  please visit the [email or web address] website: [email or web address]   This link will provide additional resources including the QUIT line and the Beat the Pack support groups.     For more information:    American Cancer Society  ([phone] 496-1409    American Heart Association  1-506.535.1368               YOU ARE THE MOST IMPORTANT FACTOR IN YOUR RECOVERY.     Follow all instructions carefully.     I have reviewed my discharge instructions with my nurse, including the following information, if applicable:     Information about my illness and diagnosis   Follow up appointments (including lab draws)   Wound Care   Equipment Needs   Medications (new and continuing) along with side effects   Preventative information such as vaccines and smoking cessations   Diet   Pain   I know when to contact my Doctor's office or seek emergency care      I want my nurse to describe the side effects of my medications: YES NO   If the answer is no, I understand the side effects of my medications: YES NO   My nurse described the side effects of my medications in a way that I could understand: YES NO   I have taken my personal belongings and my own medications with me at discharge: YES NO            I have received this information and my questions have been answered. I have discussed any concerns I see with this plan with the nurse or physician. I understand these instructions.    Signature of Patient or Responsible Person: _____________________________________    Date: _________________  Time: __________________    Signature of Healthcare Provider: _______________________________________  Date: _________________  Time: __________________

## 2017-01-16 NOTE — DISCHARGE INSTRUCTIONS
You had one Percocet for pain at 4:09 pm.        Dr. Fernando Montes and Dr. Gaston Hutchinson  [address] [facility] [address]  [place] [id number] [phone]    Discharge Instructions for Hernia Surgery      1. Go home, rest and take it easy today; however, you should get up and move about several times today to reduce the risk of developing a clot in your legs.      2. You may experience some dizziness or memory loss from the anesthesia.  This may last for the next 24 hours.  Someone should plan on staying with you for the first 24 hours for your safety.    3. Do not make any important legal decisions or sign any legal papers for the next 24 hours.      4. Eat and drink lightly today.  Start off with liquids, jello, soup, crackers or other bland foods at first. You may advance your diet tomorrow as tolerated as long as you do not experience any nausea or vomiting.     5. You may remove your outer dressings in 2 days.  The white tapes called steri-strips should stay in place.  They will fall off on their own in 1-2 weeks.  Do not worry if they come off sooner.      6. You may notice some bleeding/drainage on your outer dressings. A little bloody drainage is normal. If the bleeding/drainage is such that the bandage cannot absorb it, remove the dressing, apply clean gauze and apply firm pressure for a full 15 minutes.  If the bleeding continues, please call me.    7. You may shower tomorrow.  No tub baths until your incisions are completely healed.     8. No lifting > 20 lbs. until you are seen at your follow-up visit.         9. You have received a prescription for a narcotic pain medicine, as you will have some pain following surgery.   You will not be totally pain free, but your pain medicine should make the pain tolerable.  Please take your pain medicine as prescribed and always take your pills with food to prevent nausea. If you are having severe pain that cannot be controlled by the pain medicine, please contact  me.      10. If you had a laparoscopic surgery, it is not unusual to experience pain/discomfort in your shoulders or under your ribs after surgery.  It is from the gas used during the laparoscopic procedure and usually lasts 1-3 days.  The prescription pain medicine is used to treat the surgical pain and does not typically alleviate this “gassy” pain.     11. No driving for 24 hours and for as long as you are taking your prescription pain medicine.      12. You will need to call the office at [phone] to schedule a follow-up appointment in 2 weeks.           13. Remember to contact me for any of the following:    • Fever > 101 degrees  • Severe pain that cannot be controlled by taking your pain pills  • Severe nausea or vomiting   • Significant bleeding of your incisions  • Drainage that has a bad smell or is yellow or green in appearance  • Any other questions or concerns        Additional Instruction for Inguinal Hernia Patients Only    1. If you did not urinate at the hospital after your surgery or if you feel the need to urinate and cannot, this will necessitate a return to the Emergency Room for placement of a urinary catheter.  You should also notify me as well.  As a rule, you should be able to empty your bladder within 4-6 hours after discharge from the hospital.      You may notice some scrotal bruising and/or swelling. A scrotal support or briefs as well as ice packs may be used to alleviate discomfort

## 2017-02-22 RX ORDER — ENALAPRIL MALEATE 2.5 MG/1
TABLET ORAL
Qty: 90 TABLET | Refills: 0 | OUTPATIENT
Start: 2017-02-22

## 2017-03-02 RX ORDER — ENALAPRIL MALEATE 2.5 MG/1
TABLET ORAL
Qty: 90 TABLET | Refills: 0 | OUTPATIENT
Start: 2017-03-02

## 2017-03-07 ENCOUNTER — OFFICE VISIT (OUTPATIENT)
Dept: INTERNAL MEDICINE | Age: 52
End: 2017-03-07

## 2017-03-07 VITALS
HEART RATE: 100 BPM | SYSTOLIC BLOOD PRESSURE: 118 MMHG | BODY MASS INDEX: 28 KG/M2 | TEMPERATURE: 98.1 F | HEIGHT: 75 IN | DIASTOLIC BLOOD PRESSURE: 76 MMHG | OXYGEN SATURATION: 95 % | WEIGHT: 225.2 LBS

## 2017-03-07 DIAGNOSIS — F51.04 CHRONIC INSOMNIA: ICD-10-CM

## 2017-03-07 DIAGNOSIS — I10 ESSENTIAL HYPERTENSION: Primary | ICD-10-CM

## 2017-03-07 DIAGNOSIS — I10 ESSENTIAL HYPERTENSION: ICD-10-CM

## 2017-03-07 DIAGNOSIS — M12.80 OTHER SPECIFIC ARTHROPATHIES, NOT ELSEWHERE CLASSIFIED, UNSPECIFIED SITE: ICD-10-CM

## 2017-03-07 PROCEDURE — 99213 OFFICE O/P EST LOW 20 MIN: CPT | Performed by: INTERNAL MEDICINE

## 2017-03-07 RX ORDER — AMITRIPTYLINE HYDROCHLORIDE 25 MG/1
25 TABLET, FILM COATED ORAL NIGHTLY PRN
Qty: 90 TABLET | Refills: 3 | Status: SHIPPED | OUTPATIENT
Start: 2017-03-07 | End: 2017-04-20

## 2017-03-07 RX ORDER — AMITRIPTYLINE HYDROCHLORIDE 100 MG/1
100 TABLET, FILM COATED ORAL NIGHTLY
COMMUNITY
End: 2017-03-07 | Stop reason: SDUPTHER

## 2017-03-07 RX ORDER — PREDNISONE 10 MG/1
10 TABLET ORAL DAILY
Qty: 15 TABLET | Refills: 5 | Status: SHIPPED | OUTPATIENT
Start: 2017-03-07 | End: 2017-04-20

## 2017-03-07 RX ORDER — ALLOPURINOL 300 MG/1
300 TABLET ORAL DAILY
Qty: 90 TABLET | Refills: 3 | Status: SHIPPED | OUTPATIENT
Start: 2017-03-07 | End: 2017-10-13 | Stop reason: SDUPTHER

## 2017-03-07 RX ORDER — ENALAPRIL MALEATE 2.5 MG/1
2.5 TABLET ORAL DAILY
Qty: 90 TABLET | Refills: 3 | Status: SHIPPED | OUTPATIENT
Start: 2017-03-07 | End: 2017-10-13 | Stop reason: SDUPTHER

## 2017-03-07 RX ORDER — AMITRIPTYLINE HYDROCHLORIDE 100 MG/1
100 TABLET, FILM COATED ORAL NIGHTLY
Qty: 90 TABLET | Refills: 3 | Status: SHIPPED | OUTPATIENT
Start: 2017-03-07 | End: 2017-10-13 | Stop reason: SDUPTHER

## 2017-04-12 ENCOUNTER — TELEPHONE (OUTPATIENT)
Dept: INTERNAL MEDICINE | Age: 52
End: 2017-04-12

## 2017-04-20 ENCOUNTER — TELEPHONE (OUTPATIENT)
Dept: INTERNAL MEDICINE | Age: 52
End: 2017-04-20

## 2017-04-20 ENCOUNTER — HOSPITAL ENCOUNTER (OUTPATIENT)
Dept: GENERAL RADIOLOGY | Facility: HOSPITAL | Age: 52
Discharge: HOME | End: 2017-04-20
Admitting: INTERNAL MEDICINE

## 2017-04-20 ENCOUNTER — OFFICE VISIT (OUTPATIENT)
Dept: INTERNAL MEDICINE | Age: 52
End: 2017-04-20

## 2017-04-20 VITALS
DIASTOLIC BLOOD PRESSURE: 78 MMHG | OXYGEN SATURATION: 98 % | TEMPERATURE: 98 F | HEIGHT: 75 IN | SYSTOLIC BLOOD PRESSURE: 118 MMHG | BODY MASS INDEX: 27.65 KG/M2 | HEART RATE: 99 BPM | WEIGHT: 222.4 LBS

## 2017-04-20 DIAGNOSIS — F51.04 CHRONIC INSOMNIA: ICD-10-CM

## 2017-04-20 DIAGNOSIS — M54.12 CERVICAL RADICULOPATHY: Primary | ICD-10-CM

## 2017-04-20 DIAGNOSIS — I65.23 CAROTID ARTERY CALCIFICATION, BILATERAL: ICD-10-CM

## 2017-04-20 PROBLEM — I65.29 CAROTID ARTERY CALCIFICATION: Status: ACTIVE | Noted: 2017-04-20

## 2017-04-20 PROCEDURE — 99214 OFFICE O/P EST MOD 30 MIN: CPT | Performed by: INTERNAL MEDICINE

## 2017-04-20 PROCEDURE — 72050 X-RAY EXAM NECK SPINE 4/5VWS: CPT

## 2017-04-20 RX ORDER — CYCLOBENZAPRINE HCL 5 MG
5 TABLET ORAL DAILY
COMMUNITY
End: 2017-04-20 | Stop reason: SDUPTHER

## 2017-04-20 RX ORDER — METHYLPREDNISOLONE 4 MG/1
TABLET ORAL
Qty: 1 EACH | Refills: 0 | Status: SHIPPED | OUTPATIENT
Start: 2017-04-20 | End: 2017-08-22

## 2017-04-20 RX ORDER — CYCLOBENZAPRINE HCL 5 MG
5 TABLET ORAL DAILY
Qty: 30 TABLET | Refills: 3 | Status: SHIPPED | OUTPATIENT
Start: 2017-04-20 | End: 2017-08-22 | Stop reason: SDUPTHER

## 2017-04-20 RX ORDER — AMITRIPTYLINE HYDROCHLORIDE 25 MG/1
50 TABLET, FILM COATED ORAL NIGHTLY PRN
Qty: 90 TABLET | Refills: 3 | Status: SHIPPED | OUTPATIENT
Start: 2017-04-20 | End: 2017-10-13 | Stop reason: SDUPTHER

## 2017-04-20 NOTE — PROGRESS NOTES
Subjective   Zachary Gerard is a 51 y.o. male.     History of Present Illness     Patient presents today with 2 week history of pain and numbness in the left upper extremity.  Pain seems to originate at the tip of the shoulder and radiate into the left thumb.  It is sharp and burning in quality, intermittent in occurrence, 8/10 in severity.  He denied any antecedent trauma or repetitive activity with the left upper extremity.  At home he has tried acupuncture and massage without any significant improvement.  He did note that he has had other nerve release surgeries in the symptoms seem very similar to that pain.    Patient was seen by dentist, x-rays were done there is calcification in the area of the carotid artery    Chronic insomnia, patient needs refill of amitriptyline.  Cause of time spent on the road he has increased his dosage to 150 mg at bedtime as needed.    The following portions of the patient's history were reviewed and updated as appropriate: allergies, current medications, past family history, past medical history, past social history, past surgical history and problem list.    Review of Systems   Skin: Negative for rash.   Neurological: Negative for weakness.       Objective   Physical Exam   Constitutional: He is oriented to person, place, and time. He appears well-developed and well-nourished. No distress.   Neck: Normal range of motion. Neck supple.   Spurling's test positive left side, pain reproduced negative right side,    Cardiovascular: Intact distal pulses.    Musculoskeletal: Normal range of motion.   Neurological: He is alert and oriented to person, place, and time. He has normal strength and normal reflexes. No sensory deficit. Gait normal.   Skin: Skin is warm and dry.   Psychiatric: He has a normal mood and affect. His behavior is normal. Judgment and thought content normal.   Nursing note and vitals reviewed.      Assessment/Plan   Zachary was seen today for numbness.    Diagnoses and  all orders for this visit:    Cervical radiculopathy  -     XR Spine Cervical Complete 4 or 5 View    Chronic insomnia  -     amitriptyline (ELAVIL) 25 MG tablet; Take 2 tablets by mouth At Night As Needed for Sleep. Take 1 tablet by mouth NIGHTLY IN ADDITION TO  MG TAB IF NEEDED    Carotid artery calcification, bilateral  -     Duplex Carotid Ultrasound CAR    Other orders  -     cyclobenzaprine (FLEXERIL) 5 MG tablet; Take 1 tablet by mouth Daily.  -     MethylPREDNISolone (MEDROL, MEHNAZ,) 4 MG tablet; Take as directed on package instructions.        Number-one cervical radiculopathy, symptomatic.  Will treat with Medrol Dosepak as above, soft cervical collar, local heat.  Patient will contact me in 7-10 days if symptoms have not improved her satisfaction.    #2 chronic insomnia, refill amitriptyline as above.    #3 carotid calcification noted on recent dental x-rays, we'll schedule carotid ultrasound to verify status of carotid circulation.

## 2017-08-22 ENCOUNTER — OFFICE VISIT (OUTPATIENT)
Dept: INTERNAL MEDICINE | Age: 52
End: 2017-08-22

## 2017-08-22 VITALS
HEIGHT: 75 IN | WEIGHT: 224.6 LBS | OXYGEN SATURATION: 96 % | TEMPERATURE: 98.4 F | SYSTOLIC BLOOD PRESSURE: 122 MMHG | BODY MASS INDEX: 27.93 KG/M2 | DIASTOLIC BLOOD PRESSURE: 80 MMHG | HEART RATE: 101 BPM

## 2017-08-22 DIAGNOSIS — M79.672 FOOT PAIN, BILATERAL: ICD-10-CM

## 2017-08-22 DIAGNOSIS — R10.11 RIGHT UPPER QUADRANT ABDOMINAL PAIN: Primary | ICD-10-CM

## 2017-08-22 DIAGNOSIS — R93.89 ABNORMAL FINDING ON IMAGING: ICD-10-CM

## 2017-08-22 DIAGNOSIS — M79.671 FOOT PAIN, BILATERAL: ICD-10-CM

## 2017-08-22 PROCEDURE — 99214 OFFICE O/P EST MOD 30 MIN: CPT | Performed by: INTERNAL MEDICINE

## 2017-08-22 RX ORDER — CYCLOBENZAPRINE HCL 5 MG
5 TABLET ORAL 3 TIMES DAILY PRN
COMMUNITY
End: 2017-10-02 | Stop reason: SDUPTHER

## 2017-08-22 NOTE — PROGRESS NOTES
"Zachary Murillole / 52 y.o. / male  08/22/2017  VITALS    /80  Pulse 101  Temp 98.4 °F (36.9 °C)  Ht 75\" (190.5 cm)  Wt 224 lb 9.6 oz (102 kg)  SpO2 96%  BMI 28.07 kg/m2  BP Readings from Last 3 Encounters:   08/22/17 122/80   04/20/17 118/78   03/07/17 118/76     Wt Readings from Last 3 Encounters:   08/22/17 224 lb 9.6 oz (102 kg)   04/20/17 222 lb 6.4 oz (101 kg)   03/07/17 225 lb 3.2 oz (102 kg)      Body mass index is 28.07 kg/(m^2).    CC:  Main reason(s) for today's visit: Follow-up ([facility]: Abd pain)      HPI:     Seen at [facility] emergency room on 19 August diagnosed with diverticulitis, he was treated with metronidazole 500 mg twice a day for 10 days along with Cipro 500 mg twice a day for 10 days as well.  She was given ondansetron 3 times daily and 4 mg as needed for nausea.  He notes that the pain has improved.  He is using the antibiotics with food to improve the nausea  .    In addition to this he was noted to have some bone islands versus AVN on both femoral heads.  on his CT scan for which she was referred to orthopedics.  He has been seen by Dr. Gastelum in the past for his hips, and he is aware that surgery will be needed eventually on both hips.  At this time he prefers not to pursue this issue    Finally there was some biliary dilatation noted.  Functions were normal.  His only abdominal surgery was umbilical hernia repair.    There is also questionable right lower lobe nodule.  He is amenable to having a lung CT done.    Patient Care Team:  Olvin Cotter MD as PCP - General    ____________________________________________________________________    ASSESSMENT & PLAN:    Problem List Items Addressed This Visit     None        No orders of the defined types were placed in this encounter.      Summary/Discussion:     · Number-one right upper quadrant pain improving with current treatment.  Patient has a bit of nausea and anorexia  which I suspect are both due to the use of antibiotics.  " Recommend that he use a bland diet, and stay home from work until his antibiotic therapy has been completed, and he is feeling better before going back on the road.  ·   · #2 abnormal imaging, because of the lung nodule, we will repeat a CT scan of the lung.  As far as the biliary imaging is concerned, he has no specific symptoms at this time, and his liver functions are normal.  I suggested that we wait until we complete the antibiotics, and at that time consider an ultrasound if symptoms persist..  ·   · #3 for pain tenderness sounds like a peripheral neuropathy although screening exam here in the office is normal.  Recommend nerve conduction velocity testing for both lower extremities.  As we scheduled the hospital      No Follow-up on file.    No future appointments.    ______________________________________________________    REVIEW OF SYSTEMS    Review of Systems   Constitutional: Positive for appetite change. Negative for chills, diaphoresis and fever.   Gastrointestinal: Positive for nausea. Negative for vomiting.         PHYSICAL EXAMINATION    Physical Exam   Constitutional: He is oriented to person, place, and time. He appears well-developed and well-nourished.    Zachary had a diabetic foot exam performed (Vibration sense intact both MTP joints) today.   During the foot exam he had a monofilament test performed (Intact see below).     Neurological: He is alert and oriented to person, place, and time.   Skin: Skin is warm and dry.   Psychiatric: He has a normal mood and affect. His behavior is normal. Judgment and thought content normal.   Nursing note and vitals reviewed.      REVIEWED DATA:    Labs:     Lab Results   Component Value Date     01/16/2017    K 4.7 01/16/2017    AST 15 01/16/2017    ALT 20 01/16/2017    BUN 14 01/16/2017    CREATININE 0.87 01/16/2017    EGFRIFNONA 92 01/16/2017       No results found for: HGBA1C, GLU, MICROALBUR    Lab Results   Component Value Date    HDL 41 01/16/2017     TRIG 197 (H) 01/16/2017       No results found for: TSH, FREET4    Lab Results   Component Value Date    WBC 7.36 01/16/2017    HGB 15.2 01/16/2017    HGB 14.8 06/22/2015     01/16/2017       Imaging:        Medical Tests:        Summary of old records / correspondence / consultant report:        Request outside records:        No Known Allergies    Current Outpatient Prescriptions on File Prior to Visit   Medication Sig Dispense Refill   • allopurinol (ZYLOPRIM) 300 MG tablet Take 1 tablet by mouth Daily. 90 tablet 3   • amitriptyline (ELAVIL) 100 MG tablet Take 1 tablet by mouth Every Night. 90 tablet 3   • amitriptyline (ELAVIL) 25 MG tablet Take 2 tablets by mouth At Night As Needed for Sleep. Take 1 tablet by mouth NIGHTLY IN ADDITION TO  MG TAB IF NEEDED 90 tablet 3   • cyclobenzaprine (FLEXERIL) 5 MG tablet Take 1 tablet by mouth Daily. 30 tablet 3   • enalapril (VASOTEC) 2.5 MG tablet Take 1 tablet by mouth Daily. 90 tablet 3   • [DISCONTINUED] MethylPREDNISolone (MEDROL, MEHNAZ,) 4 MG tablet Take as directed on package instructions. 1 each 0     No current facility-administered medications on file prior to visit.        PFSH:     The following portions of the patient's history were reviewed and updated as appropriate: Allergies / Current Medications / Past Medical History / Surgical History / Social History / Family History    Patient Active Problem List   Diagnosis   • Essential hypertension   • Irritable bowel syndrome   • Gout   • Reduced libido   • Carpal tunnel syndrome   • Abdominal hernia   • Osteoporosis   • Other specific arthropathies, not elsewhere classified, unspecified site   • Cervical radiculopathy   • Chronic insomnia   • Carotid artery calcification       Past Medical History:   Diagnosis Date   • Adverse effect of anesthesia     PATIENT STATES WAKES UP ANGRY & COMBATIVE   • Arthritis    • Diverticulosis    • Gout    • Hiatal hernia    • Hypertension    • IBS (irritable bowel  syndrome)        Past Surgical History:   Procedure Laterality Date   • EXTENSOR TENDON OF FOREARM / WRIST REPAIR Bilateral    • HERNIA REPAIR      BILATERAL INGUINAL   • UMBILICAL HERNIA REPAIR N/A 1/16/2017    Procedure: UMBILICAL HERNIA REPAIR;  Surgeon: Fernando Montes Jr., MD;  Location: [facility];  Service:        Social History     Social History   • Marital status:      Spouse name: N/A   • Number of children: N/A   • Years of education: N/A     Social History Main Topics   • Smoking status: Former Smoker     Packs/day: 1.50     Years: 20.00     Types: Cigarettes     Quit date: 9/7/2016   • Smokeless tobacco: Never Used   • Alcohol use Yes      Comment: 1 GLASS WINE PER NIGHT   • Drug use: No   • Sexual activity: Defer     Other Topics Concern   • None     Social History Narrative       Family History   Problem Relation Age of Onset   • No Known Problems Mother    • No Known Problems Father          **Dragon Disclaimer:   Much of this encounter note is an electronic transcription/translation of spoken language to printed text. The electronic translation of spoken language may permit erroneous, or at times, nonsensical words or phrases to be inadvertently transcribed. Although I have reviewed the note for such errors, some may still exist.

## 2017-09-05 ENCOUNTER — TELEPHONE (OUTPATIENT)
Dept: INTERNAL MEDICINE | Age: 52
End: 2017-09-05

## 2017-09-05 DIAGNOSIS — M79.672 PAIN OF LEFT HEEL: Primary | ICD-10-CM

## 2017-09-05 DIAGNOSIS — M25.572 LEFT ANKLE PAIN, UNSPECIFIED CHRONICITY: ICD-10-CM

## 2017-09-05 DIAGNOSIS — R93.89 ABNORMAL FINDING ON IMAGING: Primary | ICD-10-CM

## 2017-09-05 NOTE — TELEPHONE ENCOUNTER
P/pt states on LDOS 8/22/17 that he forgot to mention that his LT heel had been bothering him & now his LT heel & ankle is swollen.    Pt is scheduled to have some tests ran at  Radiology on Thursday 9/7/17 & curious if  would order an xray for pt's LT heel & ankle so they can be evaluated as well.    Pls advise.    Pt can be reached #[phone].

## 2017-09-06 ENCOUNTER — OFFICE VISIT (OUTPATIENT)
Dept: INTERNAL MEDICINE | Age: 52
End: 2017-09-06

## 2017-09-06 VITALS
TEMPERATURE: 99.5 F | WEIGHT: 223.4 LBS | OXYGEN SATURATION: 94 % | BODY MASS INDEX: 27.78 KG/M2 | SYSTOLIC BLOOD PRESSURE: 128 MMHG | HEART RATE: 107 BPM | HEIGHT: 75 IN | DIASTOLIC BLOOD PRESSURE: 70 MMHG

## 2017-09-06 DIAGNOSIS — B34.9 ACUTE VIRAL SYNDROME: ICD-10-CM

## 2017-09-06 DIAGNOSIS — R50.9 FEVER IN ADULT: Primary | ICD-10-CM

## 2017-09-06 DIAGNOSIS — R39.11 URINARY HESITANCY: ICD-10-CM

## 2017-09-06 DIAGNOSIS — R52 BODY ACHES: ICD-10-CM

## 2017-09-06 LAB
EXPIRATION DATE: NORMAL
FLUAV AG NPH QL: NEGATIVE
FLUBV AG NPH QL: NEGATIVE
INTERNAL CONTROL: NORMAL
Lab: NORMAL

## 2017-09-06 PROCEDURE — 99214 OFFICE O/P EST MOD 30 MIN: CPT | Performed by: NURSE PRACTITIONER

## 2017-09-06 PROCEDURE — 87804 INFLUENZA ASSAY W/OPTIC: CPT | Performed by: NURSE PRACTITIONER

## 2017-09-06 NOTE — PROGRESS NOTES
"Subjective   Zachary Gerard is a 52 y.o. male.     Fever    This is a new problem. The current episode started yesterday. The maximum temperature noted was 103 to 103.9 F. Associated symptoms include diarrhea (x this AM (one time) ) and headaches. Pertinent negatives include no abdominal pain, chest pain, congestion, coughing, ear pain, nausea, rash, sore throat, urinary pain or vomiting. Associated symptoms comments: Difficulty with urination, dizziness, diarrhea, body aches.   Risk factors: recent travel    Risk factors: no occupational exposure and no sick contacts    Risk factors comment:  Camping x 1 week ([place], [place])   Patient expresses concern for possible mosquito bites while camping.  Denies any other urinary symptoms other than hesitancy.  No history of prostate issues, no perineal pain.  No testicular pain.  No noted blood or odor to urine.  Reports urine is dark today.  Denies any recent rashes, no known exposure to ticks or tick removal from body.    The following portions of the patient's history were reviewed and updated as appropriate: allergies, current medications, past family history, past medical history, past social history, past surgical history and problem list.    Review of Systems   Constitutional: Positive for chills, fatigue and fever. Negative for activity change and appetite change.   HENT: Negative for congestion, ear pain, rhinorrhea and sore throat.    Eyes: Negative for photophobia, redness and visual disturbance.   Respiratory: Negative for cough and shortness of breath.    Cardiovascular: Negative for chest pain.   Gastrointestinal: Positive for diarrhea (x this AM (one time) ). Negative for abdominal pain, nausea and vomiting.   Endocrine: Negative for polydipsia, polyphagia and polyuria.   Genitourinary: Positive for difficulty urinating and flank pain (bilateral, feels like \"muscle ache\" ). Negative for decreased urine volume, dysuria, frequency, hematuria, scrotal swelling, " testicular pain and urgency.   Musculoskeletal: Positive for arthralgias, back pain and myalgias.   Skin: Negative for rash.   Neurological: Positive for dizziness and headaches.   Hematological: Negative for adenopathy.       Objective   Physical Exam   Constitutional: He is oriented to person, place, and time. Vital signs are normal. He appears well-developed and well-nourished. He is cooperative. He does not appear ill. No distress.   HENT:   Head: Normocephalic and atraumatic.   Right Ear: Hearing, tympanic membrane, external ear and ear canal normal. No drainage or swelling. Tympanic membrane is not injected and not erythematous. No middle ear effusion.   Left Ear: Hearing, tympanic membrane, external ear and ear canal normal. No drainage or swelling. Tympanic membrane is not injected and not erythematous.  No middle ear effusion.   Nose: Nose normal.   Mouth/Throat: Uvula is midline, oropharynx is clear and moist and mucous membranes are normal. Tonsils are 2+ on the right. Tonsils are 2+ on the left. No tonsillar exudate.   Neck: Normal range of motion and full passive range of motion without pain. Neck supple. No rigidity. No edema and normal range of motion present.   Cardiovascular: Normal rate, regular rhythm and normal heart sounds.    No murmur heard.  Pulmonary/Chest: Effort normal and breath sounds normal. He has no decreased breath sounds. He has no wheezes. He has no rhonchi. He has no rales.   Abdominal: Soft. Normal appearance. There is no tenderness.   Genitourinary: Prostate normal. Prostate is not enlarged and not tender.   Genitourinary Comments: RITCHIE performed with Heath Townsend MA in room.    Lymphadenopathy:     He has no cervical adenopathy.        Right cervical: No superficial cervical, no deep cervical and no posterior cervical adenopathy present.       Left cervical: No superficial cervical, no deep cervical and no posterior cervical adenopathy present.   Neurological: He is alert and  oriented to person, place, and time.   Skin: Skin is warm, dry and intact. No rash noted.   Psychiatric: He has a normal mood and affect. His speech is normal and behavior is normal. Judgment and thought content normal. Cognition and memory are normal.   Nursing note and vitals reviewed.      Assessment/Plan   Problems Addressed this Visit     None      Visit Diagnoses     Fever in adult    -  Primary    Relevant Orders    POC Influenza A / B (Completed)    B. Burgdorferi Antibodies, WB Reflex    CBC & Differential    Comprehensive Metabolic Panel    Lactate Dehydrogenase    Body aches        Relevant Orders    POC Influenza A / B (Completed)    B. Burgdorferi Antibodies, WB Reflex    CBC & Differential    Comprehensive Metabolic Panel    Lactate Dehydrogenase    Urinary hesitancy        Relevant Orders    Urinalysis w/Culture if Indicated    Acute viral syndrome            1. Fever in adult  POCT UA non-remarkable as far As infection or blood, there is some protein present.  Negative influenza test. Negative RITCHIE, no tenderness to suspect acute prostatitis.  Discussed with patient that even if this was West Nile virus, testing would not show true positive until at least approximately 7 days after symptoms.  Although he does not have any known tick exposure, he does travel to different areas of the country for work.  I do suspect that this is likely some acute viral syndrome, I will obtain CBC, CMP, and Lyme testing today for further workup.  Will send urine for urinalysis and culture. Will follow regarding testing, may need to follow up if symptoms do not improve or worsen within the next few days.     - POC Influenza A / B  - B. Burgdorferi Antibodies, WB Reflex  - CBC & Differential  - Comprehensive Metabolic Panel  - Lactate Dehydrogenase    2. Body aches    - POC Influenza A / B  - B. Burgdorferi Antibodies, WB Reflex  - CBC & Differential  - Comprehensive Metabolic Panel  - Lactate Dehydrogenase    3. Urinary  hesitancy    - Urinalysis w/Culture if Indicated    4. Acute viral syndrome

## 2017-09-07 ENCOUNTER — HOSPITAL ENCOUNTER (OUTPATIENT)
Dept: GENERAL RADIOLOGY | Facility: HOSPITAL | Age: 52
Discharge: HOME | End: 2017-09-07

## 2017-09-07 ENCOUNTER — HOSPITAL ENCOUNTER (OUTPATIENT)
Dept: CT IMAGING | Facility: HOSPITAL | Age: 52
Discharge: HOME | End: 2017-09-07

## 2017-09-07 ENCOUNTER — HOSPITAL ENCOUNTER (OUTPATIENT)
Dept: INFUSION THERAPY | Facility: HOSPITAL | Age: 52
Discharge: HOME | End: 2017-09-07
Attending: PSYCHIATRY & NEUROLOGY | Admitting: PSYCHIATRY & NEUROLOGY

## 2017-09-07 ENCOUNTER — TELEPHONE (OUTPATIENT)
Dept: INTERNAL MEDICINE | Age: 52
End: 2017-09-07

## 2017-09-07 DIAGNOSIS — M79.671 RIGHT FOOT PAIN: Primary | ICD-10-CM

## 2017-09-07 DIAGNOSIS — M79.672 LEFT FOOT PAIN: ICD-10-CM

## 2017-09-07 DIAGNOSIS — G62.9 PERIPHERAL POLYNEUROPATHY: Primary | ICD-10-CM

## 2017-09-07 DIAGNOSIS — G60.9 IDIOPATHIC PERIPHERAL NEUROPATHY: ICD-10-CM

## 2017-09-07 LAB
ALBUMIN SERPL-MCNC: 4.2 G/DL (ref 3.5–5.2)
ALBUMIN/GLOB SERPL: 1.8 G/DL
ALP SERPL-CCNC: 86 U/L (ref 39–117)
ALT SERPL-CCNC: 18 U/L (ref 1–41)
AST SERPL-CCNC: 15 U/L (ref 1–40)
B BURGDOR IGG+IGM SER-ACNC: <0.91 ISR (ref 0–0.9)
B BURGDOR IGM SER IA-ACNC: <0.8 INDEX (ref 0–0.79)
BASOPHILS # BLD AUTO: 0.01 10*3/MM3 (ref 0–0.2)
BASOPHILS NFR BLD AUTO: 0.1 % (ref 0–1.5)
BILIRUB SERPL-MCNC: 1 MG/DL (ref 0.1–1.2)
BUN SERPL-MCNC: 13 MG/DL (ref 6–20)
BUN/CREAT SERPL: 11 (ref 7–25)
CALCIUM SERPL-MCNC: 8.7 MG/DL (ref 8.6–10.5)
CHLORIDE SERPL-SCNC: 92 MMOL/L (ref 98–107)
CO2 SERPL-SCNC: 24.5 MMOL/L (ref 22–29)
CREAT BLDA-MCNC: 0.9 MG/DL (ref 0.6–1.3)
CREAT SERPL-MCNC: 1.18 MG/DL (ref 0.76–1.27)
EOSINOPHIL # BLD AUTO: 0.01 10*3/MM3 (ref 0–0.7)
EOSINOPHIL NFR BLD AUTO: 0.1 % (ref 0.3–6.2)
ERYTHROCYTE [DISTWIDTH] IN BLOOD BY AUTOMATED COUNT: 13.7 % (ref 11.5–14.5)
GLOBULIN SER CALC-MCNC: 2.3 GM/DL
GLUCOSE SERPL-MCNC: 113 MG/DL (ref 65–99)
HCT VFR BLD AUTO: 48 % (ref 40.4–52.2)
HGB BLD-MCNC: 15.8 G/DL (ref 13.7–17.6)
IMM GRANULOCYTES # BLD: 0.05 10*3/MM3 (ref 0–0.03)
IMM GRANULOCYTES NFR BLD: 0.3 % (ref 0–0.5)
LDH SERPL-CCNC: 216 U/L (ref 135–225)
LYMPHOCYTES # BLD AUTO: 1.2 10*3/MM3 (ref 0.9–4.8)
LYMPHOCYTES NFR BLD AUTO: 6.7 % (ref 19.6–45.3)
MCH RBC QN AUTO: 34.2 PG (ref 27–32.7)
MCHC RBC AUTO-ENTMCNC: 32.9 G/DL (ref 32.6–36.4)
MCV RBC AUTO: 103.9 FL (ref 79.8–96.2)
MONOCYTES # BLD AUTO: 0.79 10*3/MM3 (ref 0.2–1.2)
MONOCYTES NFR BLD AUTO: 4.4 % (ref 5–12)
NEUTROPHILS # BLD AUTO: 15.9 10*3/MM3 (ref 1.9–8.1)
NEUTROPHILS NFR BLD AUTO: 88.4 % (ref 42.7–76)
PLATELET # BLD AUTO: 144 10*3/MM3 (ref 140–500)
POTASSIUM SERPL-SCNC: 4 MMOL/L (ref 3.5–5.2)
PROT SERPL-MCNC: 6.5 G/DL (ref 6–8.5)
RBC # BLD AUTO: 4.62 10*6/MM3 (ref 4.6–6)
SODIUM SERPL-SCNC: 135 MMOL/L (ref 136–145)
WBC # BLD AUTO: 17.96 10*3/MM3 (ref 4.5–10.7)

## 2017-09-07 PROCEDURE — 95909 NRV CNDJ TST 5-6 STUDIES: CPT

## 2017-09-07 PROCEDURE — 95886 MUSC TEST DONE W/N TEST COMP: CPT | Performed by: PSYCHIATRY & NEUROLOGY

## 2017-09-07 PROCEDURE — 0 IOPAMIDOL 61 % SOLUTION: Performed by: INTERNAL MEDICINE

## 2017-09-07 PROCEDURE — 73650 X-RAY EXAM OF HEEL: CPT

## 2017-09-07 PROCEDURE — 71260 CT THORAX DX C+: CPT

## 2017-09-07 PROCEDURE — 95886 MUSC TEST DONE W/N TEST COMP: CPT

## 2017-09-07 PROCEDURE — 95910 NRV CNDJ TEST 7-8 STUDIES: CPT | Performed by: PSYCHIATRY & NEUROLOGY

## 2017-09-07 PROCEDURE — 82565 ASSAY OF CREATININE: CPT

## 2017-09-07 PROCEDURE — 73610 X-RAY EXAM OF ANKLE: CPT

## 2017-09-07 RX ADMIN — IOPAMIDOL 85 ML: 612 INJECTION, SOLUTION INTRAVENOUS at 15:53

## 2017-09-07 NOTE — PROCEDURES
EMG and Nerve Conduction Studies    Please see data sheets for details on methods, temperatures and lab standards. EMG muscles tested for upper extremity studies include the deltoid, biceps, triceps, pronator teres, extensor digitorum communis, first dorsal interosseous and abductor pollicis brevis.  EMG muscles tested for lower extremity studies include the vastus lateralis, tibialis anterior, peroneus longus, medial gastrocnemius and extensor digitorum brevis.  Additional muscles tested as needed.  Paraspinal muscles tested as needed.  The complete report includes the data sheets.    Indication: Peripheral neuropathy  History: 52-year-old male with tingling and burning on the soles of the feet approximately the most distal one half of the foot.  Worse when on his feet for long periods of time.  Intermittent severe low back pain without radiating pain to the legs      Ht: 75 inches  Wt: 223 pounds  HbA1C: No results found for: HGBA1C  TSH: No results found for: TSH    Technical summary:  Nerve conduction studies were obtained in both legs.  Skin temperatures were cold around 30°C sometimes a little higher around 31°C.  Temperature correction was used where indicated.  Needle examination was obtained on selected muscles of both legs    Results:  1.  Prolonged left sural sensory latency with temperature correction at 4.1 ms with normal amplitude.  Prolonged right sural sensory latency with temperature correction at 4.6 ms with normal amplitude.  2.  Prolonged left superficial peroneal sensory latency with temperature correction at 4.6 ms with low amplitude of 4.7 µV.  Prolonged right superficial peroneal sensory latency with temperature correction at 4.5 ms with normal amplitude.  3.  Normal left peroneal motor study.  4.  Normal tibial motor studies bilaterally with distal latencies along the medial and lateral plantar motor nerves.  5.  Needle examination of selected muscles in both legs showed 1+ positive sharp  waves in the right extensor digitorum brevis.  There was an increased number of large motor units with increased firing rate and reduced interference pattern.  All other muscles tested in both legs showed normal insertional activities, motor units and recruitment patterns.  Paraspinals at L5 showed no abnormality on either side.    Impression:  Mildly abnormal study showing prolonged sensory latencies in the feet consistent with mild peripheral neuropathy.  Note that the feet were cold and temperature correction was used.  There was no evidence of a focal peroneal neuropathy at the left knee or tibial neuropathy at either ankle.  There were isolated needle exam changes in the right extensor digitorum brevis without any other features for a lumbosacral radiculopathy.  Clinical correlation is suggested.    Lamont Cisneros M.D.              Dictated utilizing Dragon dictation.

## 2017-09-07 NOTE — TELEPHONE ENCOUNTER
Called pt per NGUYỄN Edwards instructing pt to go to ER immediately for evaluation of possible significant bacterial infection, r/t significantly elevated WBC.    Pt demonstrated understanding of instructions and stated he would go immediately to ER as soon as possible for evaluation.    KD

## 2017-09-08 ENCOUNTER — HOSPITAL ENCOUNTER (EMERGENCY)
Facility: HOSPITAL | Age: 52
Discharge: LEFT BEFORE DISCHARGE | End: 2017-09-08

## 2017-09-08 VITALS
BODY MASS INDEX: 27.35 KG/M2 | TEMPERATURE: 99 F | OXYGEN SATURATION: 95 % | HEART RATE: 102 BPM | DIASTOLIC BLOOD PRESSURE: 85 MMHG | SYSTOLIC BLOOD PRESSURE: 130 MMHG | WEIGHT: 220 LBS | HEIGHT: 75 IN | RESPIRATION RATE: 16 BRPM

## 2017-09-08 LAB
ALBUMIN SERPL-MCNC: 3.8 G/DL (ref 3.5–5.2)
ALBUMIN/GLOB SERPL: 1.3 G/DL
ALP SERPL-CCNC: 65 U/L (ref 39–117)
ALT SERPL W P-5'-P-CCNC: 14 U/L (ref 1–41)
ANION GAP SERPL CALCULATED.3IONS-SCNC: 12.2 MMOL/L
AST SERPL-CCNC: 20 U/L (ref 1–40)
BASOPHILS # BLD AUTO: 0.01 10*3/MM3 (ref 0–0.2)
BASOPHILS NFR BLD AUTO: 0.1 % (ref 0–1.5)
BILIRUB SERPL-MCNC: 0.2 MG/DL (ref 0.1–1.2)
BUN BLD-MCNC: 9 MG/DL (ref 6–20)
BUN/CREAT SERPL: 11.1 (ref 7–25)
CALCIUM SPEC-SCNC: 8.7 MG/DL (ref 8.6–10.5)
CHLORIDE SERPL-SCNC: 96 MMOL/L (ref 98–107)
CO2 SERPL-SCNC: 26.8 MMOL/L (ref 22–29)
CREAT BLD-MCNC: 0.81 MG/DL (ref 0.76–1.27)
DEPRECATED RDW RBC AUTO: 46.8 FL (ref 37–54)
EOSINOPHIL # BLD AUTO: 0.06 10*3/MM3 (ref 0–0.7)
EOSINOPHIL NFR BLD AUTO: 0.6 % (ref 0.3–6.2)
ERYTHROCYTE [DISTWIDTH] IN BLOOD BY AUTOMATED COUNT: 12.8 % (ref 11.5–14.5)
GFR SERPL CREATININE-BSD FRML MDRD: 99 ML/MIN/1.73
GLOBULIN UR ELPH-MCNC: 2.9 GM/DL
GLUCOSE BLD-MCNC: 89 MG/DL (ref 65–99)
HCT VFR BLD AUTO: 43.6 % (ref 40.4–52.2)
HGB BLD-MCNC: 14.9 G/DL (ref 13.7–17.6)
IMM GRANULOCYTES # BLD: 0.02 10*3/MM3 (ref 0–0.03)
IMM GRANULOCYTES NFR BLD: 0.2 % (ref 0–0.5)
LYMPHOCYTES # BLD AUTO: 0.88 10*3/MM3 (ref 0.9–4.8)
LYMPHOCYTES NFR BLD AUTO: 8.6 % (ref 19.6–45.3)
MCH RBC QN AUTO: 34.2 PG (ref 27–32.7)
MCHC RBC AUTO-ENTMCNC: 34.2 G/DL (ref 32.6–36.4)
MCV RBC AUTO: 100 FL (ref 79.8–96.2)
MONOCYTES # BLD AUTO: 0.78 10*3/MM3 (ref 0.2–1.2)
MONOCYTES NFR BLD AUTO: 7.6 % (ref 5–12)
NEUTROPHILS # BLD AUTO: 8.52 10*3/MM3 (ref 1.9–8.1)
NEUTROPHILS NFR BLD AUTO: 82.9 % (ref 42.7–76)
PLATELET # BLD AUTO: 117 10*3/MM3 (ref 140–500)
PMV BLD AUTO: 10.2 FL (ref 6–12)
POTASSIUM BLD-SCNC: 3.8 MMOL/L (ref 3.5–5.2)
PROT SERPL-MCNC: 6.7 G/DL (ref 6–8.5)
RBC # BLD AUTO: 4.36 10*6/MM3 (ref 4.6–6)
SODIUM BLD-SCNC: 135 MMOL/L (ref 136–145)
WBC NRBC COR # BLD: 10.27 10*3/MM3 (ref 4.5–10.7)

## 2017-09-08 PROCEDURE — 36415 COLL VENOUS BLD VENIPUNCTURE: CPT | Performed by: NURSE PRACTITIONER

## 2017-09-08 PROCEDURE — 99211 OFF/OP EST MAY X REQ PHY/QHP: CPT

## 2017-09-08 PROCEDURE — 85025 COMPLETE CBC W/AUTO DIFF WBC: CPT | Performed by: NURSE PRACTITIONER

## 2017-09-08 PROCEDURE — 80053 COMPREHEN METABOLIC PANEL: CPT | Performed by: NURSE PRACTITIONER

## 2017-09-10 ENCOUNTER — HOSPITAL ENCOUNTER (OUTPATIENT)
Dept: ULTRASOUND IMAGING | Facility: HOSPITAL | Age: 52
Discharge: HOME | End: 2017-09-10
Admitting: INTERNAL MEDICINE

## 2017-09-10 PROCEDURE — 76705 ECHO EXAM OF ABDOMEN: CPT

## 2017-09-11 ENCOUNTER — TELEPHONE (OUTPATIENT)
Dept: INTERNAL MEDICINE | Age: 52
End: 2017-09-11

## 2017-09-11 NOTE — TELEPHONE ENCOUNTER
"----- Message from Simon Carlson MD sent at 9/10/2017  5:01 PM EDT -----  Call the patient with this report.    CT scan of the chest shows no acute abnormalities. The (R) Lower lobe nodule that is being evaluated shows no \"stability\" and as such suggests a benign process. If any problems or questions, a followup with Dr Cotter is advised.    "

## 2017-10-02 RX ORDER — CYCLOBENZAPRINE HCL 5 MG
5 TABLET ORAL 3 TIMES DAILY PRN
Qty: 90 TABLET | Refills: 0 | Status: SHIPPED | OUTPATIENT
Start: 2017-10-02 | End: 2017-10-13 | Stop reason: SDUPTHER

## 2017-10-02 RX ORDER — CYCLOBENZAPRINE HCL 5 MG
TABLET ORAL
Qty: 30 TABLET | Refills: 0 | OUTPATIENT
Start: 2017-10-02

## 2017-10-13 ENCOUNTER — OFFICE VISIT (OUTPATIENT)
Dept: INTERNAL MEDICINE | Age: 52
End: 2017-10-13

## 2017-10-13 VITALS
DIASTOLIC BLOOD PRESSURE: 82 MMHG | BODY MASS INDEX: 28.27 KG/M2 | HEART RATE: 104 BPM | OXYGEN SATURATION: 93 % | SYSTOLIC BLOOD PRESSURE: 146 MMHG | WEIGHT: 226.2 LBS | TEMPERATURE: 98.5 F

## 2017-10-13 DIAGNOSIS — G62.9 PERIPHERAL POLYNEUROPATHY: ICD-10-CM

## 2017-10-13 DIAGNOSIS — R93.89 ABNORMAL FINDING ON IMAGING: Primary | ICD-10-CM

## 2017-10-13 DIAGNOSIS — F51.04 CHRONIC INSOMNIA: ICD-10-CM

## 2017-10-13 DIAGNOSIS — I10 ESSENTIAL HYPERTENSION: ICD-10-CM

## 2017-10-13 PROCEDURE — 99213 OFFICE O/P EST LOW 20 MIN: CPT | Performed by: INTERNAL MEDICINE

## 2017-10-13 RX ORDER — AMITRIPTYLINE HYDROCHLORIDE 100 MG/1
100 TABLET, FILM COATED ORAL NIGHTLY
Qty: 90 TABLET | Refills: 3 | Status: SHIPPED | OUTPATIENT
Start: 2017-10-13 | End: 2018-04-11 | Stop reason: DRUGHIGH

## 2017-10-13 RX ORDER — CYCLOBENZAPRINE HCL 5 MG
5 TABLET ORAL 3 TIMES DAILY PRN
Qty: 90 TABLET | Refills: 0 | Status: SHIPPED | OUTPATIENT
Start: 2017-10-13 | End: 2017-12-26 | Stop reason: SDUPTHER

## 2017-10-13 RX ORDER — AMITRIPTYLINE HYDROCHLORIDE 25 MG/1
50 TABLET, FILM COATED ORAL NIGHTLY PRN
Qty: 90 TABLET | Refills: 3 | Status: SHIPPED | OUTPATIENT
Start: 2017-10-13 | End: 2018-04-11 | Stop reason: SDUPTHER

## 2017-10-13 RX ORDER — ENALAPRIL MALEATE 2.5 MG/1
2.5 TABLET ORAL DAILY
Qty: 90 TABLET | Refills: 3 | Status: SHIPPED | OUTPATIENT
Start: 2017-10-13 | End: 2018-04-13 | Stop reason: SDUPTHER

## 2017-10-13 RX ORDER — ALLOPURINOL 300 MG/1
300 TABLET ORAL DAILY
Qty: 90 TABLET | Refills: 3 | Status: SHIPPED | OUTPATIENT
Start: 2017-10-13 | End: 2018-04-13 | Stop reason: SDUPTHER

## 2017-10-13 NOTE — PROGRESS NOTES
Subjective   Zachary Gerard is a 52 y.o. male.     History of Present Illness     Patient presents in follow-up of our last office visit on August 22.    Nausea and anorexia have resolved with completion of the antibiotic therapy.    CT scan September 7 showed benign right lower lobe nodule and a tiny noncalcified stable nodule adjacent to the minor fissure similar to 2013.  Since these have not changed, in 4 years, they are defined as benign.    Dilated bile ducts: Distal biliary tract obstructive process cannot be ruled out with ultrasound.  The common duct measures 1.1 cm which is at the upper limit of normal.  Because of this radiology suggested MRCP, patient is agreeable to this after diagramming the potential pathology, this will be scheduled and followed up as above.    Pain and tenderness clinically consistent with a peripheral neuropathy, although no abnormalities can be elicited on physical exam.  Therefore a nerve conduction velocity was done.  Studies were consistent with a mild peripheral neuropathy.  Symptoms are worse on the right than the left foot.  Patient notes that the testing neurologist suggested that this situation could be fixed by referral to surgery, however I see nothing in the body of the report to suggest a focal compressive neuropathy.  I will have to call him next week to see if there is something lacking in this dictation.  At this time, patient is already on a try cyclic and 150 mg per day, therefore I hesitate to add more medication at this time.              The following portions of the patient's history were reviewed and updated as appropriate: allergies, current medications, past family history, past medical history, past social history, past surgical history and problem list.    Review of Systems N/C    Objective   Physical Exam   Constitutional: He is oriented to person, place, and time. He appears well-developed and well-nourished. No distress.   Neurological: He is alert and  oriented to person, place, and time.   Skin: Skin is warm and dry.   Psychiatric: He has a normal mood and affect. His behavior is normal. Judgment and thought content normal.   Nursing note and vitals reviewed.      Assessment/Plan   Zachary was seen today for follow-up.    Diagnoses and all orders for this visit:    Abnormal finding on imaging  -     MRI abdomen w contrast mrcp    Peripheral polyneuropathy        Number-one abnormal imaging, distal biliary tract dilatation, schedule MR CP.    #2 peripheral neuropathy, will discuss with Dr. Dr. Lamont Cisneros next week regarding focal abnormalities amenable to surgical release based upon his report.    This visit involved counseling and coordination of care, total face to face time 15 minutes

## 2017-10-17 ENCOUNTER — DOCUMENTATION (OUTPATIENT)
Dept: INTERNAL MEDICINE | Age: 52
End: 2017-10-17

## 2017-10-17 ENCOUNTER — TELEPHONE (OUTPATIENT)
Dept: INTERNAL MEDICINE | Age: 52
End: 2017-10-17

## 2017-10-17 DIAGNOSIS — G62.9 PERIPHERAL POLYNEUROPATHY: Primary | ICD-10-CM

## 2017-10-17 NOTE — PROGRESS NOTES
I spoke with Dr. Cisneros today.  He noted that there is no specific lesion that  is amenable to surgical release.  I have requested metabolic evaluation to rule out treatable causes of peripheral neuropathy.  We'll follow-up after laboratory has been completed.JS

## 2017-10-17 NOTE — TELEPHONE ENCOUNTER
----- Message from Olvin Cotter MD sent at 10/17/2017  8:38 AM EDT -----  I spoke with Dr. Cisneros, neurology.  There is no lesion that is amenable to surgical treatment.  I suggest we do several other laboratory test to try and discern the cause of the peripheral neuropathy.  Orders have been placed, please have patient perform these at his convenience.JS

## 2017-10-31 ENCOUNTER — TELEPHONE (OUTPATIENT)
Dept: INTERNAL MEDICINE | Age: 52
End: 2017-10-31

## 2017-10-31 NOTE — TELEPHONE ENCOUNTER
Kortney from [facility] called and said that the MRI order was put in with contrast and needs to be changed to with and without contrast, Kortney states that she can changed this order but needs approval.  Thanks     Kortney can be reached at [phone]

## 2017-11-03 LAB
ALBUMIN SERPL ELPH-MCNC: 4.2 G/DL (ref 2.9–4.4)
ALBUMIN/GLOB SERPL: 1.8 {RATIO} (ref 0.7–1.7)
ALPHA1 GLOB SERPL ELPH-MCNC: 0.2 G/DL (ref 0–0.4)
ALPHA2 GLOB SERPL ELPH-MCNC: 0.7 G/DL (ref 0.4–1)
ARSENIC 24H UR-MCNC: 26 UG/L (ref 0–50)
B-GLOBULIN SERPL ELPH-MCNC: 1 G/DL (ref 0.7–1.3)
CREAT UR-MCNC: 1.11 G/L (ref 0.3–3)
GAMMA GLOB SERPL ELPH-MCNC: 0.5 G/DL (ref 0.4–1.8)
GLOBULIN SER CALC-MCNC: 2.4 G/DL (ref 2.2–3.9)
INORG ARSENIC UR-MCNC: NORMAL UG/L (ref 0–19)
LEAD 24H UR-MCNC: NORMAL UG/L (ref 0–49)
Lab: ABNORMAL
M PROTEIN SERPL ELPH-MCNC: ABNORMAL G/DL
MERCURY 24H UR-MCNC: NORMAL UG/L (ref 0–19)
PROT SERPL-MCNC: 6.6 G/DL (ref 6–8.5)
RPR SER QL: NON REACTIVE
TSH SERPL DL<=0.005 MIU/L-ACNC: 3.5 UIU/ML (ref 0.45–4.5)
VIT B12 SERPL-MCNC: 264 PG/ML (ref 211–946)

## 2017-11-06 ENCOUNTER — HOSPITAL ENCOUNTER (OUTPATIENT)
Dept: MRI IMAGING | Facility: HOSPITAL | Age: 52
Discharge: HOME | End: 2017-11-06

## 2017-11-09 ENCOUNTER — APPOINTMENT (OUTPATIENT)
Dept: MRI IMAGING | Facility: HOSPITAL | Age: 52
End: 2017-11-09

## 2017-11-21 ENCOUNTER — HOSPITAL ENCOUNTER (OUTPATIENT)
Dept: GENERAL RADIOLOGY | Facility: HOSPITAL | Age: 52
Discharge: HOME | End: 2017-11-21
Admitting: NURSE PRACTITIONER

## 2017-11-21 ENCOUNTER — HOSPITAL ENCOUNTER (OUTPATIENT)
Dept: GENERAL RADIOLOGY | Facility: HOSPITAL | Age: 52
Discharge: HOME | End: 2017-11-21

## 2017-11-21 ENCOUNTER — OFFICE VISIT (OUTPATIENT)
Dept: INTERNAL MEDICINE | Age: 52
End: 2017-11-21

## 2017-11-21 VITALS
HEART RATE: 85 BPM | BODY MASS INDEX: 28.23 KG/M2 | SYSTOLIC BLOOD PRESSURE: 122 MMHG | WEIGHT: 227 LBS | OXYGEN SATURATION: 92 % | HEIGHT: 75 IN | DIASTOLIC BLOOD PRESSURE: 78 MMHG | TEMPERATURE: 98.8 F

## 2017-11-21 DIAGNOSIS — M25.552 ACUTE HIP PAIN, LEFT: Primary | ICD-10-CM

## 2017-11-21 DIAGNOSIS — M79.672 ACUTE FOOT PAIN, LEFT: ICD-10-CM

## 2017-11-21 PROCEDURE — 99213 OFFICE O/P EST LOW 20 MIN: CPT | Performed by: NURSE PRACTITIONER

## 2017-11-21 PROCEDURE — 73630 X-RAY EXAM OF FOOT: CPT

## 2017-11-21 PROCEDURE — 73502 X-RAY EXAM HIP UNI 2-3 VIEWS: CPT

## 2017-11-21 RX ORDER — DICLOFENAC SODIUM 75 MG/1
TABLET, DELAYED RELEASE ORAL
COMMUNITY
Start: 2017-10-23 | End: 2017-11-21 | Stop reason: SDUPTHER

## 2017-11-21 RX ORDER — DICLOFENAC SODIUM 75 MG/1
75 TABLET, DELAYED RELEASE ORAL 2 TIMES DAILY PRN
Qty: 30 TABLET | Refills: 0 | Status: SHIPPED | OUTPATIENT
Start: 2017-11-21 | End: 2017-12-21

## 2017-11-21 RX ORDER — PREDNISONE 10 MG/1
TABLET ORAL
Qty: 1 EACH | Refills: 0 | Status: SHIPPED | OUTPATIENT
Start: 2017-11-21 | End: 2017-12-21

## 2017-11-21 NOTE — PROGRESS NOTES
Subjective   Zachary Gerard is a 52 y.o. male.     Hip Pain    Incident onset: 3 weeks ago. There was no injury mechanism. The pain is present in the left hip (anterior groin). The quality of the pain is described as aching. The pain is moderate. The pain has been fluctuating (worse after periods of inactivity or when awakening, better once patient becomes active) since onset. Pertinent negatives include no inability to bear weight, loss of motion, loss of sensation, muscle weakness, numbness or tingling. He has tried NSAIDs (chiropractor, accupuncture) for the symptoms. The treatment provided no relief.   Lower Extremity Issue   This is a new problem. The current episode started 1 to 4 weeks ago. The problem occurs constantly. The problem has been unchanged. Associated symptoms include arthralgias (left anterior hip pain) and joint swelling (left foot ). Pertinent negatives include no chest pain, chills, fatigue, fever or numbness. Exacerbated by: bearing weight, palpation. He has tried NSAIDs and rest for the symptoms.      Patient reports that he's been told in the past that he has a history of arthritis.  At one point, he was being evaluated by Dr. Romero for possible gout/rheumatoid arthritis of right leg, although patient is not sure whether he has a clear diagnosis of either.   Denies any fevers chills, fatigue, or systemic symptoms.  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  Denies any pain and lower leg of calf, no lower leg swelling. No history of DVT.     The following portions of the patient's history were reviewed and updated as appropriate: allergies, current medications, past family history, past medical history, past social history, past surgical history and problem list.    Review of Systems   Constitutional: Negative for chills, fatigue and fever.   Respiratory: Negative for shortness of breath.    Cardiovascular: Negative for chest pain and leg swelling.   Musculoskeletal:  Positive for arthralgias (left anterior hip pain) and joint swelling (left foot ).   Neurological: Negative for tingling and numbness.       Objective   Physical Exam   Constitutional: He is oriented to person, place, and time. Vital signs are normal. He appears well-developed and well-nourished. He is cooperative. He does not appear ill. No distress.   Cardiovascular:   Pulses:       Dorsalis pedis pulses are 2+ on the right side, and 2+ on the left side.        Posterior tibial pulses are 2+ on the left side.   No LE swelling.    Musculoskeletal:        Left hip: He exhibits tenderness (anterior groin tenderness). He exhibits normal range of motion, normal strength, no bony tenderness, no swelling, no crepitus, no deformity and no laceration.   Pain with adduction of left hip, no pain with abduction. Pain in hip with external and internal rotation.   Negative Lesley's sign.   No redness noted of foot.           Neurological: He is alert and oriented to person, place, and time. He has normal strength.   Nursing note and vitals reviewed.      Assessment/Plan   Problems Addressed this Visit     None      Visit Diagnoses     Acute hip pain, left    -  Primary    Relevant Medications    PredniSONE (DELTASONE) 10 MG (21) tablet pack    diclofenac (VOLTAREN) 75 MG EC tablet    Other Relevant Orders    XR Hip With or Without Pelvis 2 - 3 View Left    Acute foot pain, left        Relevant Medications    PredniSONE (DELTASONE) 10 MG (21) tablet pack    diclofenac (VOLTAREN) 75 MG EC tablet    Other Relevant Orders    XR Foot 3+ View Left        1. Acute hip pain, left  Suspect osteoarthritis of left hip.  Will treat with short course of steroids, followed by anti-inflammatory.  Hip and foot x-rays ordered today.  May need referral back to orthopedics if significant findings found on x-ray.    - XR Hip With or Without Pelvis 2 - 3 View Left  - PredniSONE (DELTASONE) 10 MG (21) tablet pack; Use as directed on package   Dispense: 1 each; Refill: 0  - diclofenac (VOLTAREN) 75 MG EC tablet; Take 1 tablet by mouth 2 (Two) Times a Day As Needed (pain).  Dispense: 30 tablet; Refill: 0    2. Acute foot pain, left  Left dorsal foot pain with no signs of gout or infection.  Does have some tenderness to palpation and moderate swelling.  Will obtain x-ray of foot to rule out any type of stress fracture, also consider overcompensation injury related to left hip pain.    - XR Foot 3+ View Left  - PredniSONE (DELTASONE) 10 MG (21) tablet pack; Use as directed on package  Dispense: 1 each; Refill: 0  - diclofenac (VOLTAREN) 75 MG EC tablet; Take 1 tablet by mouth 2 (Two) Times a Day As Needed (pain).  Dispense: 30 tablet; Refill: 0

## 2017-11-21 NOTE — PATIENT INSTRUCTIONS
MAY START DICLOFENAC AFTER FINISHING STEROIDS. TAKE EACH DOSE WITH FOOD.     Arthritis  Arthritis is a term that is commonly used to refer to joint pain or joint disease. There are more than 100 types of arthritis.  CAUSES  The most common cause of this condition is wear and tear of a joint. Other causes include:  · Gout.  · Inflammation of a joint.  · An infection of a joint.  · Sprains and other injuries near the joint.  · A drug reaction or allergic reaction.  In some cases, the cause may not be known.  SYMPTOMS  The main symptom of this condition is pain in the joint with movement. Other symptoms include:  · Redness, swelling, or stiffness at a joint.  · Warmth coming from the joint.  · Fever.  · Overall feeling of illness.  DIAGNOSIS  This condition may be diagnosed with a physical exam and tests, including:  · Blood tests.  · Urine tests.  · Imaging tests, such as MRI, X-rays, or a CT scan.  Sometimes, fluid is removed from a joint for testing.  TREATMENT  Treatment for this condition may involve:  · Treatment of the cause, if it is known.  · Rest.  · Raising (elevating) the joint.  · Applying cold or hot packs to the joint.  · Medicines to improve symptoms and reduce inflammation.  · Injections of a steroid such as cortisone into the joint to help reduce pain and inflammation.  Depending on the cause of your arthritis, you may need to make lifestyle changes to reduce stress on your joint. These changes may include exercising more and losing weight.  HOME CARE INSTRUCTIONS  Medicines  · Take over-the-counter and prescription medicines only as told by your health care provider.  · Do not take aspirin to relieve pain if gout is suspected.  Activities  · Rest your joint if told by your health care provider. Rest is important when your disease is active and your joint feels painful, swollen, or stiff.  · Avoid activities that make the pain worse. It is important to balance activity with rest.  · Exercise your  joint regularly with range-of-motion exercises as told by your health care provider. Try doing low-impact exercise, such as:    Swimming.    Water aerobics.    Biking.    Walking.  Joint Care  · If your joint is swollen, keep it elevated if told by your health care provider.  · If your joint feels stiff in the morning, try taking a warm shower.  · If directed, apply heat to the joint. If you have diabetes, do not apply heat without permission from your health care provider.    Put a towel between the joint and the hot pack or heating pad.    Leave the heat on the area for 20-30 minutes.  · If directed, apply ice to the joint:    Put ice in a plastic bag.    Place a towel between your skin and the bag.    Leave the ice on for 20 minutes, 2-3 times per day.  · Keep all follow-up visits as told by your health care provider. This is important.  SEEK MEDICAL CARE IF:  · The pain gets worse.  · You have a fever.  SEEK IMMEDIATE MEDICAL CARE IF:  · You develop severe joint pain, swelling, or redness.  · Many joints become painful and swollen.  · You develop severe back pain.  · You develop severe weakness in your leg.  · You cannot control your bladder or bowels.     This information is not intended to replace advice given to you by your health care provider. Make sure you discuss any questions you have with your health care provider.     Document Released: 01/25/2006 Document Revised: 04/10/2017 Document Reviewed: 03/14/2016  [employer] Interactive Patient Education ©2017 [employer]

## 2017-11-22 ENCOUNTER — TELEPHONE (OUTPATIENT)
Dept: INTERNAL MEDICINE | Age: 52
End: 2017-11-22

## 2017-11-22 NOTE — TELEPHONE ENCOUNTER
----- Message from NGUYỄN Man sent at 11/22/2017  7:22 AM EST -----  Please call patient with results and send result letter to home address.    Zachary:     Your hip Xray shows some mild arthritis of both hips, no significant arthritic change.     Your foot XRay shows multiple old fractures of the third toe, a non-acute fracture of the 5th metatarsal, and the heel spur that was previously mentioned. Unfortunately, the Xray you had back in September did not capture the foot so I cannot be certain how old these fractures are. The fracture of the mid-fifth metatarsal appears to be healing and is not displaced, so I think at this time it should heal appropriately with time. The area of the fracture is not exactly where your pain is, but is located to that area of the foot.     I would continue the steroids, then NSAID after the steroid, and follow up if the hip pain is still not improved after 2 weeks.   Grace ADRIAN

## 2017-11-22 NOTE — TELEPHONE ENCOUNTER
Spoke w/ pt re: X-ray results.    Pt was informed of arthritis of hips, and multiple fractures in the foot.    Pt was advised to continue medications as Rx'd, and to f/u if no improvement after 2 weeks.    Pt demonstrated understanding.    KD

## 2017-12-21 ENCOUNTER — OFFICE VISIT (OUTPATIENT)
Dept: ORTHOPEDIC SURGERY | Facility: CLINIC | Age: 52
End: 2017-12-21

## 2017-12-21 ENCOUNTER — OFFICE VISIT (OUTPATIENT)
Dept: INTERNAL MEDICINE | Age: 52
End: 2017-12-21

## 2017-12-21 VITALS — TEMPERATURE: 98.4 F | HEIGHT: 75 IN | BODY MASS INDEX: 27.85 KG/M2 | WEIGHT: 224 LBS

## 2017-12-21 VITALS
TEMPERATURE: 98.5 F | DIASTOLIC BLOOD PRESSURE: 70 MMHG | WEIGHT: 224.2 LBS | HEART RATE: 97 BPM | BODY MASS INDEX: 27.88 KG/M2 | OXYGEN SATURATION: 97 % | HEIGHT: 75 IN | SYSTOLIC BLOOD PRESSURE: 120 MMHG

## 2017-12-21 DIAGNOSIS — M70.62 TROCHANTERIC BURSITIS OF LEFT HIP: ICD-10-CM

## 2017-12-21 DIAGNOSIS — M70.62 TROCHANTERIC BURSITIS OF LEFT HIP: Primary | ICD-10-CM

## 2017-12-21 DIAGNOSIS — M25.552 PAIN OF LEFT HIP JOINT: Primary | ICD-10-CM

## 2017-12-21 DIAGNOSIS — S93.492A SPRAIN OF ANTERIOR TALOFIBULAR LIGAMENT OF LEFT ANKLE, INITIAL ENCOUNTER: ICD-10-CM

## 2017-12-21 PROCEDURE — 99243 OFF/OP CNSLTJ NEW/EST LOW 30: CPT | Performed by: ORTHOPAEDIC SURGERY

## 2017-12-21 PROCEDURE — 99214 OFFICE O/P EST MOD 30 MIN: CPT | Performed by: INTERNAL MEDICINE

## 2017-12-21 RX ORDER — GABAPENTIN 300 MG/1
1 CAPSULE ORAL NIGHTLY
Refills: 0 | COMMUNITY
Start: 2017-12-05 | End: 2018-03-29

## 2017-12-21 NOTE — PROGRESS NOTES
Subjective   Zachary Gerard is a 52 y.o. male.     History of Present Illness 's today with left hip pain.  This is been present for the past 4 weeks.  Pain is noted laterally and in the inguinal canal region.  Pain is worse with weightbearing, and exiting his vehicle after being seated for a period of time.  Pain is 8/10 in severity, pain is sharp and burning in quality.  He has been tried on gabapentin, steroids, and Holter none of which are provided any significant amount of relief.  He was scheduled for an injection of the joint, but that is not going to happen for 4 weeks.  X-ray shows bilateral degenerative disease.    He also notes pain in the left foot is located in the mid shaft along the area of the metatarsals..  Swelling resolved overnight.  Recurs the following day.  Pain is constant, 5/10 in severity aching in quality.  X-ray showed fracture the fifth metatarsal shaft appeared to be healing.  I have reviewed the films, there is no fracture noted at the tip of the fibula.  He denies any known antecedent trauma.    The following portions of the patient's history were reviewed and updated as appropriate: allergies, current medications, past family history, past social history and problem list.    Review of Systems    Objective   Physical Exam   Constitutional: He is oriented to person, place, and time. He appears well-developed and well-nourished. He appears distressed.   Cardiovascular: Intact distal pulses.    Musculoskeletal: Normal range of motion.   Left ankle which tenderness noted at the anterior talofibular ligament.  Pain is worsened with stress inversion of the joint.  There is mild welling noted.    Left hip tenderness noted over the greater trochanteric area.  There is no discomfort with internal nor external rotation of hip joint at 90°.  Shakir test positive.   Neurological: He is alert and oriented to person, place, and time.   Skin: Skin is warm and dry. He is not diaphoretic.    Psychiatric: He has a normal mood and affect. His behavior is normal. Judgment and thought content normal.   Nursing note and vitals reviewed.      Assessment/Plan   Zachary was seen today for hypertension.    Diagnoses and all orders for this visit:    Trochanteric bursitis of left hip    Sprain of anterior talofibular ligament of left ankle, initial encounter      Number-one trochanteric bursitis left hip.  Patient needs injection.  Contact Dr. Montes see if this is possible since patient spends quite a bit of time out of the area working.  At this point he is concerned that he may fall because of the pain.    #2 sprain anterior talofibular ligament, recommend elevation, brace local heat.  Patient is free to 4 activity when he can hop on his left ankle without discomfort.      I contacted Dr. Montes, she will see him this afternoon at 3:30.

## 2017-12-21 NOTE — PROGRESS NOTES
Left New Hip      Patient: Zachary Gerard        YOB: 1965    Medical Record Number: [id number]        Chief Complaints: left hip pain   Chief Complaint   Patient presents with   • Left Hip - Pain, Establish Care           History of Present Illness: This is a 52-year-old patient with Dr. 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   • amitriptyline (ELAVIL) 100 MG tablet Take 1 tablet by mouth Every Night.   • amitriptyline (ELAVIL) 25 MG tablet Take 2 tablets by mouth At Night As Needed for Sleep. Take 1 tablet by mouth NIGHTLY IN ADDITION TO  MG TAB IF NEEDED   • cyclobenzaprine (FLEXERIL) 5 MG tablet Take 1 tablet by mouth 3 (Three) Times a Day As Needed for Muscle Spasms.   • enalapril (VASOTEC) 2.5 MG tablet Take 1 tablet by mouth Daily.   • gabapentin (NEURONTIN) 300 MG capsule Take 1 capsule by mouth Every Night.   • [DISCONTINUED] diclofenac (VOLTAREN) 75 MG EC tablet Take 1 tablet by mouth 2 (Two) Times a Day As Needed (pain).   • [DISCONTINUED] PredniSONE (DELTASONE) 10 MG (21) tablet pack Use as directed on package     No current facility-administered medications on file prior to visit.      Current Medications:  Scheduled  "Meds:  Continuous Infusions:  No current facility-administered medications for this visit.   PRN Meds:.    Past Medical History:   Diagnosis Date   • Adverse effect of anesthesia     PATIENT STATES WAKES UP ANGRY & COMBATIVE   • Arthritis    • Diverticulosis    • Gout    • Hiatal hernia    • Hypertension    • IBS (irritable bowel syndrome)         Past Surgical History:   Procedure Laterality Date   • EXTENSOR TENDON OF FOREARM / WRIST REPAIR Bilateral    • HERNIA REPAIR      BILATERAL INGUINAL   • UMBILICAL HERNIA REPAIR N/A 1/16/2017    Procedure: UMBILICAL HERNIA REPAIR;  Surgeon: Fernando Montes Jr., MD;  Location: [facility] MAIN OR;  Service:         Social History     Occupational History   • Not on file.     Social History Main Topics   • Smoking status: Former Smoker     Packs/day: 1.50     Years: 20.00     Types: Cigarettes     Quit date: 9/7/2016   • Smokeless tobacco: Never Used   • Alcohol use Yes      Comment: 1 GLASS WINE PER NIGHT   • Drug use: No   • Sexual activity: Defer    Social History     Social History Narrative        Family History   Problem Relation Age of Onset   • No Known Problems Mother    • No Known Problems Father              Review of Systems: 14 point review of systems are remarkable for the hip pain the remainder are negative  Review of Systems      Physical Exam: 52 y.o. male  General Appearance:    Alert, cooperative, in no acute distress                 Vitals:    12/21/17 1545   Temp: 98.4 °F (36.9 °C)   TempSrc: Temporal Artery    Weight: 102 kg (224 lb)   Height: 190.5 cm (75\")      Patient is alert and read ×3 no acute distress appears her above-listed at height weight and age.  Affect is normal respiratory rate is normal unlabored. Heart rate regular rate rhythm, sclera, dentition and hearing are normal for the purpose of this exam.        Ortho Exam         Physical exam of the  Left hip.  She has no overlying skin changes no lymphedema lymphadenopathy she has distinct " Tenderness over the greater trochanter.  She has a negative straight leg raise negative Lasegue's some pain with Shakir's but in the area the greater trochanter.  She has good range of motion all directions with pain in range of internal rotation at primarily the area the greater trochanter.    Radiology:   AP, Lateral of the left hip was ordered/reviewed to evauateknhip pain.  These were reviewed in epic have no comparative films he has very mild degenerative changes seen in the hip but still reasonable maintenance of his joint space FE no acute bony pathology    Assessment/Plan:  Left hip pain he is tender over his trochanteric bursa I do think and injections quite reasonable as a diagnostic and therapeutic tool.  He is quite symptomatic when I rotate him.  I explained to him that trochanter bursitis is the great imitate her if he does not improve with this injection I would be quick to get an MRI of his hip.  I will send a copy of these notes to Dr. Cotter    If this patient should call back while I am out and the injection did not help I would have someone please order an MRI of his left hip as I'm ruling out an occult fracture

## 2017-12-26 RX ORDER — CYCLOBENZAPRINE HCL 5 MG
TABLET ORAL
Qty: 90 TABLET | Refills: 0 | Status: SHIPPED | OUTPATIENT
Start: 2017-12-26 | End: 2018-01-31 | Stop reason: SDUPTHER

## 2018-01-18 ENCOUNTER — OFFICE VISIT (OUTPATIENT)
Dept: INTERNAL MEDICINE | Age: 53
End: 2018-01-18

## 2018-01-18 VITALS
OXYGEN SATURATION: 99 % | TEMPERATURE: 98.2 F | DIASTOLIC BLOOD PRESSURE: 78 MMHG | HEART RATE: 102 BPM | HEIGHT: 75 IN | BODY MASS INDEX: 28.5 KG/M2 | SYSTOLIC BLOOD PRESSURE: 124 MMHG | WEIGHT: 229.2 LBS

## 2018-01-18 DIAGNOSIS — M25.552 PAIN OF LEFT HIP JOINT: Primary | ICD-10-CM

## 2018-01-18 PROCEDURE — 99213 OFFICE O/P EST LOW 20 MIN: CPT | Performed by: INTERNAL MEDICINE

## 2018-01-18 RX ORDER — MELOXICAM 15 MG/1
15 TABLET ORAL DAILY
COMMUNITY
End: 2018-01-18 | Stop reason: SDUPTHER

## 2018-01-18 RX ORDER — MELOXICAM 15 MG/1
15 TABLET ORAL
Qty: 30 TABLET | Refills: 3 | Status: SHIPPED | OUTPATIENT
Start: 2018-01-18 | End: 2018-03-29

## 2018-01-18 NOTE — PROGRESS NOTES
"    Zachary LUCIO Washle / 52 y.o. / male  01/18/2018  VITALS    Visit Vitals   • /78   • Pulse 102   • Temp 98.2 °F (36.8 °C)   • Ht 190.5 cm (75\")   • Wt 104 kg (229 lb 3.2 oz)   • SpO2 99%   • BMI 28.65 kg/m2       BP Readings from Last 3 Encounters:   01/18/18 124/78   12/21/17 120/70   11/21/17 122/78     Wt Readings from Last 3 Encounters:   01/18/18 104 kg (229 lb 3.2 oz)   12/21/17 102 kg (224 lb)   12/21/17 102 kg (224 lb 3.2 oz)      Body mass index is 28.65 kg/(m^2).    CC:  Main reason(s) for today's visit: Hip Pain (f/u injection of hip)      HPI:     Patient presents today in follow-up of left hip pain.  He was seen by orthopedics on 1221 and the trochanteric bursa was injected.  That eliminated the lateral pain, however patient still has inguinal pain at this point which is worse with weightbearing.  He has tried meloxicam 15 mg per day which she has had from rheumatology that seemed to improve the pain significantly mobile.  Since the pain did not totally resolve with the injection, and reviewing orthopedics note, I won't order an MRI of the left hip.  Last creatinine done September 2017 0.81, creatinine clearance 99 cc/m.    Patient Care Team:  Olvin Cotter MD as PCP - General  ____________________________________________________________________    ASSESSMENT & PLAN:    Problem List Items Addressed This Visit     None      Visit Diagnoses     Pain of left hip joint    -  Primary    Relevant Medications    meloxicam (MOBIC) 15 MG tablet    Other Relevant Orders    MRI hip left wo contrast        Orders Placed This Encounter   Procedures   • MRI hip left wo contrast       Summary/Discussion:  · Number-one left hip pain, not totally resolved following injection of trochanteric bursa.  Orthopedics (Dr. Montes) has recommended MRI of the hip.  That will be ordered today without contrast, follow-up after results are noted.  Refill Mobic to be used on an as-needed basis as well.  Renal function is " adequate to support use of NSAIDs.    Return if symptoms worsen or fail to improve.    No future appointments.    ______________________________________________________    REVIEW OF SYSTEMS    Review of Systems   Musculoskeletal: Positive for arthralgias.           PHYSICAL EXAMINATION    Physical Exam   Constitutional: He is oriented to person, place, and time. He appears well-developed and well-nourished. No distress.   Neurological: He is alert and oriented to person, place, and time.   Skin: Skin is warm and dry. He is not diaphoretic.   Psychiatric: He has a normal mood and affect. His behavior is normal. Judgment and thought content normal.   Nursing note and vitals reviewed.      REVIEWED DATA:    Labs:     Lab Results   Component Value Date     (L) 09/08/2017    K 3.8 09/08/2017    AST 20 09/08/2017    ALT 14 09/08/2017    BUN 9 09/08/2017    CREATININE 0.81 09/08/2017    CREATININE 0.90 09/07/2017    CREATININE 1.18 09/06/2017    EGFRIFNONA 99 09/08/2017    EGFRIFAFRI 78 09/06/2017       Lab Results   Component Value Date     (H) 09/06/2017       Lab Results   Component Value Date    HDL 41 01/16/2017    TRIG 197 (H) 01/16/2017       Lab Results   Component Value Date    TSH 3.500 11/01/2017       Lab Results   Component Value Date    WBC 10.27 09/08/2017    HGB 14.9 09/08/2017    HGB 15.8 09/06/2017    HGB 15.2 01/16/2017     (L) 09/08/2017       Imaging:         Medical Tests:         Summary of old records / correspondence / consultant report:         Request outside records:         ALLERGIES  No Known Allergies     MEDICATIONS  Current Outpatient Prescriptions   Medication Sig Dispense Refill   • meloxicam (MOBIC) 15 MG tablet Take 1 tablet by mouth Daily With Breakfast. 30 tablet 3   • allopurinol (ZYLOPRIM) 300 MG tablet Take 1 tablet by mouth Daily. 90 tablet 3   • amitriptyline (ELAVIL) 100 MG tablet Take 1 tablet by mouth Every Night. 90 tablet 3   • amitriptyline (ELAVIL) 25  MG tablet Take 2 tablets by mouth At Night As Needed for Sleep. Take 1 tablet by mouth NIGHTLY IN ADDITION TO  MG TAB IF NEEDED 90 tablet 3   • cyclobenzaprine (FLEXERIL) 5 MG tablet TAKE ONE TABLET BY MOUTH THREE TIMES A DAY AS NEEDED FOR MUSCLE SPASMS 90 tablet 0   • enalapril (VASOTEC) 2.5 MG tablet Take 1 tablet by mouth Daily. 90 tablet 3   • gabapentin (NEURONTIN) 300 MG capsule Take 1 capsule by mouth Every Night.  0     No current facility-administered medications for this visit.        PFSH:     The following portions of the patient's history were reviewed and updated as appropriate: Allergies / Current Medications / Past Medical History / Surgical History / Social History / Family History    PROBLEM LIST   Patient Active Problem List   Diagnosis   • Essential hypertension   • Irritable bowel syndrome   • Gout   • Reduced libido   • Carpal tunnel syndrome   • Abdominal hernia   • Osteoporosis   • Other specific arthropathies, not elsewhere classified, unspecified site   • Cervical radiculopathy   • Chronic insomnia   • Carotid artery calcification   • Abnormal finding on imaging   • Peripheral polyneuropathy       PAST MEDICAL HISTORY  Past Medical History:   Diagnosis Date   • Adverse effect of anesthesia     PATIENT STATES WAKES UP ANGRY & COMBATIVE   • Arthritis    • Diverticulosis    • Gout    • Hiatal hernia    • Hypertension    • IBS (irritable bowel syndrome)        SURGICAL HISTORY  Past Surgical History:   Procedure Laterality Date   • EXTENSOR TENDON OF FOREARM / WRIST REPAIR Bilateral    • HERNIA REPAIR      BILATERAL INGUINAL   • UMBILICAL HERNIA REPAIR N/A 1/16/2017    Procedure: UMBILICAL HERNIA REPAIR;  Surgeon: Fernando Montes Jr., MD;  Location: [facility] OR;  Service:        SOCIAL HISTORY  Social History     Social History   • Marital status:      Spouse name: N/A   • Number of children: N/A   • Years of education: N/A     Social History Main Topics   • Smoking status:  Former Smoker     Packs/day: 1.50     Years: 20.00     Types: Cigarettes     Quit date: 9/7/2016   • Smokeless tobacco: Never Used   • Alcohol use Yes      Comment: 1 GLASS WINE PER NIGHT   • Drug use: No   • Sexual activity: Defer     Other Topics Concern   • None     Social History Narrative       FAMILY HISTORY  Family History   Problem Relation Age of Onset   • No Known Problems Mother    • No Known Problems Father          **Dragon Disclaimer:   Much of this encounter note is an electronic transcription/translation of spoken language to printed text. The electronic translation of spoken language may permit erroneous, or at times, nonsensical words or phrases to be inadvertently transcribed. Although I have reviewed the note for such errors, some may still exist.

## 2018-01-27 ENCOUNTER — HOSPITAL ENCOUNTER (OUTPATIENT)
Dept: MRI IMAGING | Facility: HOSPITAL | Age: 53
Discharge: HOME | End: 2018-01-27
Admitting: INTERNAL MEDICINE

## 2018-01-27 PROCEDURE — 73721 MRI JNT OF LWR EXTRE W/O DYE: CPT

## 2018-01-29 PROBLEM — M87.9 OSTEONECROSIS OF LEFT HIP (HCC): Status: ACTIVE | Noted: 2018-01-29

## 2018-01-31 RX ORDER — CYCLOBENZAPRINE HCL 5 MG
TABLET ORAL
Qty: 90 TABLET | Refills: 0 | Status: SHIPPED | OUTPATIENT
Start: 2018-01-31 | End: 2018-03-12 | Stop reason: SDUPTHER

## 2018-02-08 ENCOUNTER — TELEPHONE (OUTPATIENT)
Dept: ORTHOPEDIC SURGERY | Facility: CLINIC | Age: 53
End: 2018-02-08

## 2018-02-08 NOTE — TELEPHONE ENCOUNTER
Patient had left hip MRI at [facility] per Dr. Cotter. Dr. Cotter told him he needs a SAADIA [facility]. [facility] informed [facility] not [facility]ADIA[facility] but would get referral from [facility] to consult a doctor in practice who does.

## 2018-02-22 ENCOUNTER — TELEPHONE (OUTPATIENT)
Dept: INTERNAL MEDICINE | Age: 53
End: 2018-02-22

## 2018-02-22 NOTE — TELEPHONE ENCOUNTER
Pt's wife called wanting to schedule an appointment so pt could update Dr Cotter regarding his issue with his rt hip and rt knee. The first available at this moment is 4-3-18 and she wasn't pleased with that and asked to speak to medical assistant.  Mrs. Gerard's # [phone]  Thanks SP

## 2018-03-06 ENCOUNTER — CONSULT (OUTPATIENT)
Dept: ORTHOPEDIC SURGERY | Facility: CLINIC | Age: 53
End: 2018-03-06

## 2018-03-06 VITALS — BODY MASS INDEX: 27.48 KG/M2 | TEMPERATURE: 98.3 F | WEIGHT: 221 LBS | HEIGHT: 75 IN

## 2018-03-06 DIAGNOSIS — M25.552 LEFT HIP PAIN: Primary | ICD-10-CM

## 2018-03-06 DIAGNOSIS — M87.052 AVASCULAR NECROSIS OF FEMORAL HEAD, LEFT (HCC): ICD-10-CM

## 2018-03-06 DIAGNOSIS — M79.672 LEFT FOOT PAIN: ICD-10-CM

## 2018-03-06 PROCEDURE — 99204 OFFICE O/P NEW MOD 45 MIN: CPT | Performed by: ORTHOPAEDIC SURGERY

## 2018-03-06 PROCEDURE — 73502 X-RAY EXAM HIP UNI 2-3 VIEWS: CPT | Performed by: ORTHOPAEDIC SURGERY

## 2018-03-06 NOTE — PROGRESS NOTES
Patient: Zachary Gerard  YOB: 1965 52 y.o. male  Medical Record Number: [id number]    Chief Complaints:   Chief Complaint   Patient presents with   • Left Hip - Establish Care, Pain       History of Present Illness:Zachary Gerard is a 52 y.o. male who presents with  Left hip pain.  He has severe constant stabbing burning pain within the groin it's markedly worsened in the last few months.  He's also had moderate discomfort ongoing for nearly a year.  He denies any history of trauma or change in activity level.  He did have a long-standing history of ischial bursitis and has undergone multiple injections and Medrol Dosepak for treatment of this.  Additionally said swelling in his leg and ankle pain as well as foot pain.  He is undergone x-rays in the past but has neither treatment.  Foot is actually better today than it has been a last few months and the hip is major issue but at times the foot is more painful than the hip.    Allergies: No Known Allergies    Medications:   Current Outpatient Prescriptions   Medication Sig Dispense Refill   • allopurinol (ZYLOPRIM) 300 MG tablet Take 1 tablet by mouth Daily. 90 tablet 3   • amitriptyline (ELAVIL) 100 MG tablet Take 1 tablet by mouth Every Night. 90 tablet 3   • amitriptyline (ELAVIL) 25 MG tablet Take 2 tablets by mouth At Night As Needed for Sleep. Take 1 tablet by mouth NIGHTLY IN ADDITION TO  MG TAB IF NEEDED 90 tablet 3   • cyclobenzaprine (FLEXERIL) 5 MG tablet TAKE ONE TABLET BY MOUTH THREE TIMES A DAY AS NEEDED FOR MUSCLE SPASMS 90 tablet 0   • enalapril (VASOTEC) 2.5 MG tablet Take 1 tablet by mouth Daily. 90 tablet 3   • meloxicam (MOBIC) 15 MG tablet Take 1 tablet by mouth Daily With Breakfast. 30 tablet 3   • gabapentin (NEURONTIN) 300 MG capsule Take 1 capsule by mouth Every Night.  0     No current facility-administered medications for this visit.          The following portions of the patient's history were reviewed and updated as  "appropriate: allergies, current medications, past family history, past medical history, past social history, past surgical history and problem list.    Review of Systems:   A 14 point review of systems was performed. All systems negative except pertinent positives/negative listed in HPI above    Physical Exam:   Vitals:    03/06/18 1528   Temp: 98.3 °F (36.8 °C)   TempSrc: Temporal Artery    Weight: 100 kg (221 lb)   Height: 190.5 cm (75\")       General: A and O x 3, ASA, NAD    SCLERA:    Normal    DENTITION:   Normal  Hip:  left    LEG ALIGNMENT:     Neutral        LEG LENGTH DISCREPANCY   :    none    GAIT:     Antalgic    SKIN:     No abnormality    RANGE OF MOTION:     Limited by joint irritability    STRENGTH:     Limited by joint irratibility    DISTAL PULSES:    Paplable    DISTAL SENSATION :   Intact    LYMPHATICS:     No   lymphadenopathy    OTHER:          +   Stinchfeld test      -    log roll      -   Tenderness to palpation trochanteric bursa    There is moderate diffuse swelling about the midfoot and forefoot.  He is very tender about the joints of the midfoot.  There is some mild diffuse erythema    Radiology:  Xrays 2views LEFT hip (AP bilateral hips, and lateral of the hip) ordered and reviewed for evaluation of hip pain  demonstrating  evidence of AVN with subchondral femoral head collapse. There are no previous films for comparision.    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  The pain is severe it is progressive and is limiting his basic activities of daily living.  There are no further conservative measures which would help.  The next step is hip replacement.    Left foot pain and swelling which is been chronic and ongoing since his hip pain began.  I'm not sure if this is dependent edema from the hip or if there is a localized foot problem.  He does have tenderness directly over the metatarsals he is a previous x-ray which shows an old metatarsal fracture but " nothing acute that I can appreciate.  Given the severity of the symptoms I'm recommending an MRI of the foot.  We need to figure out if there is anything with in the foot is giving rises pain before considering hip replacement.  Once we get the foot failure doubt we will proceed with getting the hip replacement scheduled.  I feel he would be better candid for posterior approach total hip replacement overnight or same-day surgery.      Andrew Avila MD  3/6/2018

## 2018-03-12 RX ORDER — CYCLOBENZAPRINE HCL 5 MG
TABLET ORAL
Qty: 90 TABLET | Refills: 0 | Status: SHIPPED | OUTPATIENT
Start: 2018-03-12 | End: 2018-03-29

## 2018-03-15 ENCOUNTER — HOSPITAL ENCOUNTER (OUTPATIENT)
Dept: MRI IMAGING | Facility: HOSPITAL | Age: 53
Discharge: HOME | End: 2018-03-15
Attending: ORTHOPAEDIC SURGERY | Admitting: ORTHOPAEDIC SURGERY

## 2018-03-15 DIAGNOSIS — M79.672 LEFT FOOT PAIN: ICD-10-CM

## 2018-03-15 PROCEDURE — 73718 MRI LOWER EXTREMITY W/O DYE: CPT

## 2018-03-27 ENCOUNTER — OFFICE VISIT (OUTPATIENT)
Dept: ORTHOPEDIC SURGERY | Facility: CLINIC | Age: 53
End: 2018-03-27

## 2018-03-27 VITALS — HEIGHT: 75 IN | TEMPERATURE: 97.6 F | WEIGHT: 228.6 LBS | BODY MASS INDEX: 28.42 KG/M2

## 2018-03-27 DIAGNOSIS — M16.9 OSTEOARTHRITIS OF HIP, UNSPECIFIED LATERALITY, UNSPECIFIED OSTEOARTHRITIS TYPE: Primary | ICD-10-CM

## 2018-03-29 ENCOUNTER — OFFICE VISIT (OUTPATIENT)
Dept: INTERNAL MEDICINE | Age: 53
End: 2018-03-29

## 2018-03-29 VITALS
DIASTOLIC BLOOD PRESSURE: 90 MMHG | TEMPERATURE: 97.9 F | SYSTOLIC BLOOD PRESSURE: 144 MMHG | OXYGEN SATURATION: 97 % | BODY MASS INDEX: 26.88 KG/M2 | WEIGHT: 216.2 LBS | HEIGHT: 75 IN | HEART RATE: 108 BPM

## 2018-03-29 DIAGNOSIS — M87.9 OSTEONECROSIS OF LEFT HIP (HCC): Primary | ICD-10-CM

## 2018-03-29 PROCEDURE — 99213 OFFICE O/P EST LOW 20 MIN: CPT | Performed by: INTERNAL MEDICINE

## 2018-03-29 RX ORDER — TRAMADOL HYDROCHLORIDE 50 MG/1
50 TABLET ORAL EVERY 8 HOURS PRN
Qty: 45 TABLET | Refills: 0 | Status: SHIPPED | OUTPATIENT
Start: 2018-03-29 | End: 2018-04-06

## 2018-03-29 NOTE — PROGRESS NOTES
"    Zachary Gerard / 52 y.o. / male  03/29/2018  VITALS    Visit Vitals  /90   Pulse 108   Temp 97.9 °F (36.6 °C)   Ht 190.2 cm (74.88\")   Wt 98.1 kg (216 lb 3.2 oz)   SpO2 97%   BMI 27.11 kg/m²       BP Readings from Last 3 Encounters:   03/29/18 144/90   01/18/18 124/78   12/21/17 120/70     Wt Readings from Last 3 Encounters:   03/29/18 98.1 kg (216 lb 3.2 oz)   03/27/18 104 kg (228 lb 9.6 oz)   03/15/18 100 kg (221 lb)      Body mass index is 27.11 kg/m².    CC:  Main reason(s) for today's visit: Med Refill and Pain (hip, foot)      HPI:     Patient has avascular necrosis left hip.  He is due to have hip replacement, but now has stress fractures in left foot.  They are not healing with immobilization.  He has an appointment to see orthopedics, Dr. Walter or Dr. Gonzalez next Friday.  Past history is significant for similar finding in the right foot 7 years ago which was repaired by Dr. Bertrand.  This also had a fragility fracture with no ostensible trauma.  Patient has grossly normal circulation with no complaint of claudication, pulses were palpable bilaterally orthopedics, they also did Doppler studies which were normal.  He denied any prior fractures as a youngster or during early adult life.  Patient requests something for pain relief.  Has tried NSAIDs, and gabapentin without significant improvement.    Patient Care Team:  Olvin Cotter MD as PCP - General  ____________________________________________________________________    ASSESSMENT & PLAN:    Problem List Items Addressed This Visit        Unprioritized    Osteonecrosis of left hip - Primary    Overview     1/2018         Relevant Medications    traMADol (ULTRAM) 50 MG tablet    Other Relevant Orders    DEXA Bone Density Axial    Compliance Drug Analysis, Ur - Urine, Clean Catch      Other Visit Diagnoses    None.       Orders Placed This Encounter   Procedures   • DEXA Bone Density Axial   • Compliance Drug Analysis, Ur - Urine, Clean Catch "       Summary/Discussion:    Number-one avascular necrosis of the hip and foot.  I'm concerned that there may be an underlying metabolic bone disorder.  Will check bone density testing.  In the meantime, patient has appointment with Dr. Gonzalez or Saba next Friday for evaluation of his foot issue before he can have his hip replacement done.  In the meantime, I will provide a prescription for Ultram for him for pain relief.  Patient is aware that he should not drive while using this medication.  We will provide 50 mg 3 times a day for 2 weeks to see if this is helpful.  He will let me know in the next week or so if this is been helpful.    [facility] report #[id number] reviewed, no anomalies appreciated.    Goal of therapy is to relieve pain to allow normal participation in activities of daily living.      Return if symptoms worsen or fail to improve.    Future Appointments  Date Time Provider Department Center   4/6/2018 9:30 AM Zachary Gonzalez MD [facility] None       ______________________________________________________    REVIEW OF SYSTEMS    Review of Systems   Constitutional: Negative for appetite change.   Gastrointestinal: Negative for constipation, diarrhea and nausea.         PHYSICAL EXAMINATION    Physical Exam   Constitutional: He is oriented to person, place, and time. He appears well-developed and well-nourished.   Cardiovascular: Intact distal pulses.    Neurological: He is alert and oriented to person, place, and time.   Skin: Skin is warm and dry.   Psychiatric: He has a normal mood and affect. His behavior is normal. Judgment and thought content normal.     REVIEWED DATA:    Labs:     Lab Results   Component Value Date     (L) 09/08/2017    K 3.8 09/08/2017    AST 20 09/08/2017    ALT 14 09/08/2017    BUN 9 09/08/2017    CREATININE 0.81 09/08/2017    CREATININE 0.90 09/07/2017    CREATININE 1.18 09/06/2017    EGFRIFNONA 99 09/08/2017    EGFRIFAFRI 78 09/06/2017       Lab Results   Component  Value Date    GLUCOSE 89 09/08/2017    GLUCOSE 107 (H) 01/16/2017       Lab Results   Component Value Date    LDL 65 01/16/2017    HDL 41 01/16/2017    TRIG 197 (H) 01/16/2017       Lab Results   Component Value Date    TSH 3.500 11/01/2017       Lab Results   Component Value Date    WBC 10.27 09/08/2017    HGB 14.9 09/08/2017    HGB 15.8 09/06/2017    HGB 15.2 01/16/2017     (L) 09/08/2017       Imaging:         Medical Tests:         Summary of old records / correspondence / consultant report:         Request outside records:         ALLERGIES  No Known Allergies     MEDICATIONS  Current Outpatient Prescriptions   Medication Sig Dispense Refill   • allopurinol (ZYLOPRIM) 300 MG tablet Take 1 tablet by mouth Daily. 90 tablet 3   • amitriptyline (ELAVIL) 100 MG tablet Take 1 tablet by mouth Every Night. 90 tablet 3   • amitriptyline (ELAVIL) 25 MG tablet Take 2 tablets by mouth At Night As Needed for Sleep. Take 1 tablet by mouth NIGHTLY IN ADDITION TO  MG TAB IF NEEDED 90 tablet 3   • enalapril (VASOTEC) 2.5 MG tablet Take 1 tablet by mouth Daily. 90 tablet 3   • traMADol (ULTRAM) 50 MG tablet Take 1 tablet by mouth Every 8 (Eight) Hours As Needed for Moderate Pain . 45 tablet 0     No current facility-administered medications for this visit.        PFSH:     The following portions of the patient's history were reviewed and updated as appropriate: Allergies / Current Medications / Past Medical History / Surgical History / Social History / Family History    PROBLEM LIST   Patient Active Problem List   Diagnosis   • Essential hypertension   • Irritable bowel syndrome   • Gout   • Reduced libido   • Carpal tunnel syndrome   • Abdominal hernia   • Osteoporosis   • Other specific arthropathies, not elsewhere classified, unspecified site   • Cervical radiculopathy   • Chronic insomnia   • Carotid artery calcification   • Abnormal finding on imaging   • Peripheral polyneuropathy   • Osteonecrosis of left hip        PAST MEDICAL HISTORY  Past Medical History:   Diagnosis Date   • Adverse effect of anesthesia     PATIENT STATES WAKES UP ANGRY & COMBATIVE   • Arthritis    • Diverticulosis    • Gout    • Hiatal hernia    • Hypertension    • IBS (irritable bowel syndrome)        SURGICAL HISTORY  Past Surgical History:   Procedure Laterality Date   • EXTENSOR TENDON OF FOREARM / WRIST REPAIR Bilateral    • FOOT FRACTURE SURGERY     • HERNIA REPAIR      BILATERAL INGUINAL   • UMBILICAL HERNIA REPAIR N/A 1/16/2017    Procedure: UMBILICAL HERNIA REPAIR;  Surgeon: Fernando Montes Jr., MD;  Location: [facility] OR;  Service:        SOCIAL HISTORY  Social History     Social History   • Marital status:      Occupational History   • Business owner      Social History Main Topics   • Smoking status: Former Smoker     Packs/day: 1.50     Years: 20.00     Types: Cigarettes     Quit date: 9/7/2016   • Smokeless tobacco: Never Used   • Alcohol use Yes      Comment: 1 GLASS WINE PER NIGHT   • Drug use: No   • Sexual activity: Defer     Other Topics Concern   • Not on file       FAMILY HISTORY  Family History   Problem Relation Age of Onset   • No Known Problems Mother    • No Known Problems Father          **Dragon Disclaimer:   Much of this encounter note is an electronic transcription/translation of spoken language to printed text. The electronic translation of spoken language may permit erroneous, or at times, nonsensical words or phrases to be inadvertently transcribed. Although I have reviewed the note for such errors, some may still exist.

## 2018-04-02 LAB — DRUGS UR: NORMAL

## 2018-04-06 ENCOUNTER — HOSPITAL ENCOUNTER (OUTPATIENT)
Dept: CARDIOLOGY | Facility: HOSPITAL | Age: 53
Discharge: HOME | End: 2018-04-06
Attending: ORTHOPAEDIC SURGERY | Admitting: ORTHOPAEDIC SURGERY

## 2018-04-06 ENCOUNTER — DOCUMENTATION (OUTPATIENT)
Dept: INTERNAL MEDICINE | Age: 53
End: 2018-04-06

## 2018-04-06 ENCOUNTER — OFFICE VISIT (OUTPATIENT)
Dept: ORTHOPEDIC SURGERY | Facility: CLINIC | Age: 53
End: 2018-04-06

## 2018-04-06 VITALS — TEMPERATURE: 97.9 F | BODY MASS INDEX: 29.65 KG/M2 | HEIGHT: 74 IN | WEIGHT: 231 LBS

## 2018-04-06 DIAGNOSIS — M25.475 SWELLING OF FOOT JOINT, LEFT: ICD-10-CM

## 2018-04-06 DIAGNOSIS — M79.89 PAIN AND SWELLING OF LEFT LOWER LEG: ICD-10-CM

## 2018-04-06 DIAGNOSIS — M79.662 PAIN AND SWELLING OF LEFT LOWER LEG: ICD-10-CM

## 2018-04-06 DIAGNOSIS — M79.89 PAIN AND SWELLING OF TOE OF LEFT FOOT: ICD-10-CM

## 2018-04-06 DIAGNOSIS — I87.2 VENOUS INSUFFICIENCY OF BOTH LOWER EXTREMITIES: ICD-10-CM

## 2018-04-06 DIAGNOSIS — M79.675 PAIN AND SWELLING OF TOE OF LEFT FOOT: ICD-10-CM

## 2018-04-06 DIAGNOSIS — M79.672 FOOT PAIN, LEFT: Primary | ICD-10-CM

## 2018-04-06 DIAGNOSIS — L53.9 FOOT ERYTHEMA: ICD-10-CM

## 2018-04-06 DIAGNOSIS — M79.672 FOOT PAIN, LEFT: ICD-10-CM

## 2018-04-06 DIAGNOSIS — M87.9 OSTEONECROSIS OF LEFT HIP (HCC): Primary | ICD-10-CM

## 2018-04-06 LAB

## 2018-04-06 PROCEDURE — 99215 OFFICE O/P EST HI 40 MIN: CPT | Performed by: ORTHOPAEDIC SURGERY

## 2018-04-06 PROCEDURE — 93970 EXTREMITY STUDY: CPT

## 2018-04-06 PROCEDURE — 73630 X-RAY EXAM OF FOOT: CPT | Performed by: ORTHOPAEDIC SURGERY

## 2018-04-06 NOTE — PROGRESS NOTES
"Patient:  Zachary Gerard is a 52 y.o. male    Chief Complaint/ Reason for Visit:    Chief Complaint   Patient presents with   • Left Foot - Establish Care, Pain, Foot Swelling       HPI:  This pleasant gentleman presents today complaining of a 3 month history of very unusual pain, swelling, and discoloration in his left foot.  He did not have an injury, excessive or unusual activity, or other known inciting event.  He has severe constant pain in his left forefoot.  It's stabbing and burning in character.  He also has noted redness swelling and bruising in the dorsal forefoot and extending into his toes.  He has no idea what caused the onset of these unusual symptoms.  He is also being evaluated for a left total hip due to avascular necrosis of uncertain origins by Dr. Andrew Avila.  He is in the process of being evaluated for multiple systemic disorders by Dr. Cotter, his primary care physician, to seek a source for the patient's unusual musculoskeletal symptoms.    The patient tells me the does have a history of remote injury to his left foot and had a healed fracture.  He says that about 8 or 9 years ago his left leg and foot swelled \"huge\" he says and he went to see Dr. Romero, a rheumatologist.  He says that Dr. PARADA thought he had gout but then said he did not have gout.  He has also seen Dr. Yessica Cohen of rheumatology for an opinion and says that she did not know what might be causing the strange swelling and pain in his left foot either.  He says that Dr. Cotter has done many test, but cannot come up with an answer either.    The patient does not have any calf pain, chest pain, or shortness of breath.  He is currently in a Cam Walker boot and says the foot definitely feels better in the boot but he still has severe constant pain in his foot.  It makes it hard for him to sleep at night.    He has not had any fever, chills, sweats, or shakes.  He has not had any recent calf pain or chest pain or palpitations.  He " has been on several long airline flights recently and is a bit fatigued from that.  He also did has a history of DVT in the remote past, though the source of this was never ascertained.  He, however, is not chronically anticoagulated.    He has no other acute musculoskeletal complaints besides his left hip pain.  He does say that he had a history in the distant past where his right foot and leg swelled massively and subsequently resolve, and no one ever figured out what caused that, either.    The patient does not presently drink alcohol nor has he in several years.  He does not smoke.  He does not have a history of diabetes.  He does not have a history of chronic steroid use.  He does do scuba diving, but has not done any ocean diving to any significant depths and more than 2 years.      PMH:    Past Medical History:   Diagnosis Date   • Adverse effect of anesthesia     PATIENT STATES WAKES UP ANGRY & COMBATIVE   • Arthritis    • Diverticulosis    • Gout    • Hiatal hernia    • Hypertension    • IBS (irritable bowel syndrome)        PSH:    Past Surgical History:   Procedure Laterality Date   • EXTENSOR TENDON OF FOREARM / WRIST REPAIR Bilateral    • FOOT FRACTURE SURGERY     • HERNIA REPAIR      BILATERAL INGUINAL   • UMBILICAL HERNIA REPAIR N/A 1/16/2017    Procedure: UMBILICAL HERNIA REPAIR;  Surgeon: Fernando Montes Jr., MD;  Location: [facility];  Service:        Social Hx:    Social History     Social History   • Marital status:      Spouse name: N/A   • Number of children: N/A   • Years of education: N/A     Occupational History   • Business owner      Social History Main Topics   • Smoking status: Former Smoker     Packs/day: 1.50     Years: 20.00     Types: Cigarettes     Quit date: 9/7/2016   • Smokeless tobacco: Never Used   • Alcohol use Yes      Comment: 1 GLASS WINE PER NIGHT   • Drug use: No   • Sexual activity: Defer     Other Topics Concern   • Not on file     Social History Narrative   •  "No narrative on file       Family Hx:    Family History   Problem Relation Age of Onset   • No Known Problems Mother    • No Known Problems Father        Meds:    Current Outpatient Prescriptions:   •  allopurinol (ZYLOPRIM) 300 MG tablet, Take 1 tablet by mouth Daily., Disp: 90 tablet, Rfl: 3  •  amitriptyline (ELAVIL) 100 MG tablet, Take 1 tablet by mouth Every Night., Disp: 90 tablet, Rfl: 3  •  amitriptyline (ELAVIL) 25 MG tablet, Take 2 tablets by mouth At Night As Needed for Sleep. Take 1 tablet by mouth NIGHTLY IN ADDITION TO  MG TAB IF NEEDED, Disp: 90 tablet, Rfl: 3  •  enalapril (VASOTEC) 2.5 MG tablet, Take 1 tablet by mouth Daily., Disp: 90 tablet, Rfl: 3    Allergies:  No Known Allergies    ROS:  Review of Systems   Constitutional: Positive for activity change and fatigue. Negative for chills, diaphoresis, fever and unexpected weight change.   Respiratory: Negative for chest tightness and shortness of breath.    Cardiovascular: Positive for leg swelling. Negative for chest pain and palpitations.   Musculoskeletal: Positive for arthralgias, gait problem, joint swelling and myalgias.   Neurological: Negative for weakness and numbness.   All other systems reviewed and are negative.      Vitals:    04/06/18 1003   Temp: 97.9 °F (36.6 °C)   TempSrc: Temporal Artery    Weight: 105 kg (231 lb)   Height: 188 cm (74\")     Body mass index is 29.66 kg/m².    Physical Exam    The patient is awake, alert, and oriented ×3.  The patient is in no acute distress.  Breathing is regular and unlabored with a respiratory rate of 14/m.  Extraocular movements and pupillary responses are symmetrically intact. Sclerae are anicteric.   Hearing is within normal limits.  Speech is within normal limits.  There is no jugular venous distention.    He has a pronounced limp antalgic from the left.    Left lower extremity: The left foot and lower leg are diffusely swollen.  Incidental note is made of scattered prominent " subcutaneous veins diffusely throughout both lower extremities consistent with chronic venous insufficiency.  The patient does confirm a family history of chronic venous insufficiency when I asked him.  He does have a matti appearance to the dorsal left forefoot primarily proximal to the second third and fourth toes.  He has diffuse tenderness here.  He can move his toes but it's uncomfortable.  I did check his sensation in both feet with the [place]Car[place] monofilaments, and the patient could accurately consistently localize the 2.83 filament, which is the smallest.  He has brisk capillary filling in all of his toes.  He has a 1+ regular left dorsalis pedis pulse with a current heart rate of about 84 bpm.  His foot looks like it has cellulitis, but it is curiously not abnormally warm to palpation.  His midfoot is also mildly tender.  His calf is soft and nontender.  I do not feel a venous cord.  I do not see any lymphangitis.  I do not feel any popliteal or inguinal lymphadenopathy in the left lower extremity.        Radiology: X-rays: 3 views the patient's left foot were ordered and reviewed today to assess his complaints of left foot pain, swelling, and discoloration.  These images reveal the patient has a remote healed mildly displaced long oblique fracture of the left fifth metatarsal.  He also has a healed fracture of his left third toe.  It appears as though he is likely starting to develop some disuse osteopenia in the forefoot.  I don't see any obvious acute fractures or acute stress fractures.  In comparison, I reviewed 3 views of the patient's left foot from November of last year on the [employer] system.  The old fractures appear the same on today's films as they did back then.  The only interval change I see is what I perceived to be the development of some disuse osteopenia on today's films compared to these previous images.    MRI left foot: I reviewed these images personally and also reviewed the  radiologist's report.  The patient doesn't like he has a stress fracture or stress reaction in the fourth metatarsal.  He also is developing evidence in the form of edema in the second third and fifth metatarsals distally also.  As suggested by the radiologist, this may represent early osteonecrosis.  Given his history of osteonecrosis of the left femoral head, I would suspect that that is in fact what is happening.    Labs: Reviewed multiple laboratory studies that the patient has had, including multiple CBCs dating back as far as 2015.  He does have some slowly increasing abnormalities in his red cell indices, and also recently has some concerning abnormalities in the white cell differential.    His thyroid studies have been fairly normal.  His heavy metal screening also was negative.  His Lyme disease screening was also negative.  His protein electrophoresis, however, did show a slightly elevated A/G ratio of 1.8.  A recent CMP showed only a very minor low level of sodium and chloride.                Assessment:     Diagnosis Plan   1. Foot pain, left  XR Foot 3+ View Left    Duplex Venous Lower Extremity - Bilateral CAR   2. Pain and swelling of toe of left foot  Duplex Venous Lower Extremity - Bilateral CAR   3. Swelling of foot joint, left  Duplex Venous Lower Extremity - Bilateral CAR   4. Foot erythema  Duplex Venous Lower Extremity - Bilateral CAR   5. Venous insufficiency of both lower extremities  Duplex Venous Lower Extremity - Bilateral CAR   6. Pain and swelling of left lower leg  Duplex Venous Lower Extremity - Bilateral CAR           Plan:  I discussed everything with the patient at length.  I explained that I needed to spend some more time going over his labs and history and reviewing other prior studies.  He voiced understanding.  I explained that he had a very complex problem and that I would do my very best to help find an answer.    I also spent about 10 minutes on the phone with Dr. Thomas Cotter,  the patient's primary care physician discussing the patient's history and my current evaluation.    I explained to Dr. Cotter that I was concerned the patient may have some sort of a hemoglobin abnormality or a red cell abnormality that was causing thromboses of his microvasculature which were resulting in the avascular necrosis of the patient's bones.    I recommended that he consider sending the patient to the hematologist for evaluation and consultation.  Given his recent CBC abnormalities, it may be best if they evaluate him from a hematologic and an oncologic standpoint.    Certainly the source of his current pain and swelling remains a mystery.  I think hematology/oncology consultation is the most likely avenue that will provide us an answer, at least based on his present status and workup to date.    Given the patient's venous insufficiency, recent air travel, swelling, immobilization in the boot, and history of prior DVT, I think we need to check Doppler ultrasounds to make sure that he is not redeveloped DVT.  The patient agrees.    I spent well over an hour in the evaluation and management of this patient today.  Certainly more than 50% of this time, and the vast majority of it, was spent in coordination of the patient's care as well as face-to-face examination, discussion, counseling, and question and answer.  Obviously, I also spent a great deal of time reviewing the patient's previous labs, imaging, and workup.  I reviewed Dr. Avila's recent office notes as well as Dr. Montes's recent office notes.    I want to reexamine the patient in about a month.  Hopefully his hematologic workup will be underway by then.      Orders Placed This Encounter   Procedures   • XR Foot 3+ View Left     Order Specific Question:   Reason for Exam:     Answer:   ihc per rbb lt. foot

## 2018-04-06 NOTE — PROGRESS NOTES
Case discussed with orthopedics Dr. Zachary Gonzalez.  Patient does not have any of the usual causes of avascular necrosis.  We will rule out hemoglobinopathy as an obscure cause with a hemoglobin electrophoresis.  Dr. Cotter

## 2018-04-09 ENCOUNTER — OFFICE VISIT (OUTPATIENT)
Dept: INTERNAL MEDICINE | Age: 53
End: 2018-04-09

## 2018-04-09 VITALS
HEART RATE: 100 BPM | SYSTOLIC BLOOD PRESSURE: 112 MMHG | BODY MASS INDEX: 29.21 KG/M2 | WEIGHT: 227.6 LBS | TEMPERATURE: 97.7 F | OXYGEN SATURATION: 96 % | HEIGHT: 74 IN | DIASTOLIC BLOOD PRESSURE: 80 MMHG

## 2018-04-09 DIAGNOSIS — M87.00 AVASCULAR NECROSIS (HCC): Primary | ICD-10-CM

## 2018-04-09 PROCEDURE — 99213 OFFICE O/P EST LOW 20 MIN: CPT | Performed by: INTERNAL MEDICINE

## 2018-04-09 RX ORDER — GABAPENTIN 300 MG/1
300 CAPSULE ORAL 3 TIMES DAILY
Qty: 45 CAPSULE | Refills: 1 | Status: SHIPPED | OUTPATIENT
Start: 2018-04-09 | End: 2018-05-04 | Stop reason: DRUGHIGH

## 2018-04-09 NOTE — PROGRESS NOTES
"    Zachary LUCIO Washle / 52 y.o. / male  04/09/2018  VITALS    Visit Vitals  /80   Pulse 100   Temp 97.7 °F (36.5 °C)   Ht 188 cm (74.02\")   Wt 103 kg (227 lb 9.6 oz)   SpO2 96%   BMI 29.21 kg/m²       BP Readings from Last 3 Encounters:   04/09/18 112/80   03/29/18 144/90   01/18/18 124/78     Wt Readings from Last 3 Encounters:   04/09/18 103 kg (227 lb 9.6 oz)   04/06/18 105 kg (231 lb)   03/29/18 98.1 kg (216 lb 3.2 oz)      Body mass index is 29.21 kg/m².    CC:  Main reason(s) for today's visit: Hip Pain (Left)      HPI:     Patient seen by orthopedics last week, see comments in chart regarding discussion with Dr. Gonzalez.  Patient will have hemoglobin decreases done today, has bone density scheduled for Wednesday, consultation was placed for hematology today.    Pain control: Patient tried Ultram which was not helpful, and actually caused diarrhea.  He has tried Mobic, Motrin, aspirin, Aleve, Neurontin, without any significant improvement in discomfort.  Patient is having difficulty sleeping because of the pain.  Is also having swelling in his leg as well.  Duplex done April 6 showed no evidence of DVT.  Patient is unwilling to use narcotics.  He has used gabapentin in the past and has provided significant relief.    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  He is aware of the need to elevate his legs to control swelling is also wearing her compression hose on the left leg.    We also discussed the possibility of going to [facility] or [facility] ER if our evaluation is not provided viable diagnosis.    Patient Care Team:  Olvin Cotter MD as PCP - General  ____________________________________________________________________    ASSESSMENT & PLAN:    Problem List Items Addressed This Visit        Unprioritized    Avascular necrosis - Primary    Overview     Noted left hip several left metatarsal bones April 2018           Other Visit Diagnoses    None.       No orders of the defined types were " placed in this encounter.      Summary/Discussion:      Number-one avascular necrosis left hip foot workup in progress.  Hemoglobin electrophoresis today, consultation for hematology place today.  Pain control:.  Empiric trial of Neurontin for pain relief at 300 mg 3 times a day.  Dispense 45 tablets, refill ×1.  Contract, urine drug screen, Asael report completed at last visit.  Patient will contact me by phone with an update on pain control within the next week or so.    No Follow-up on file.    Future Appointments  Date Time Provider Department Center   4/11/2018 11:10 AM [facility] None   4/11/2018 12:30 PM [facility]       ______________________________________________________    REVIEW OF SYSTEMS    Review of Systems   Psychiatric/Behavioral: Positive for sleep disturbance.         PHYSICAL EXAMINATION    Physical Exam   Constitutional: He is oriented to person, place, and time. He appears well-developed and well-nourished.   Musculoskeletal: He exhibits edema.   Edema left lower extremity   Neurological: He is alert and oriented to person, place, and time.   Skin: Skin is warm and dry.   Psychiatric: He has a normal mood and affect. His behavior is normal. Judgment and thought content normal.   Nursing note and vitals reviewed.      REVIEWED DATA:    Labs:     Lab Results   Component Value Date     (L) 09/08/2017    K 3.8 09/08/2017    AST 20 09/08/2017    ALT 14 09/08/2017    BUN 9 09/08/2017    CREATININE 0.81 09/08/2017    CREATININE 0.90 09/07/2017    CREATININE 1.18 09/06/2017    EGFRIFNONA 99 09/08/2017    EGFRIFAFRI 78 09/06/2017       Lab Results   Component Value Date    GLUCOSE 89 09/08/2017    GLUCOSE 107 (H) 01/16/2017       Lab Results   Component Value Date    LDL 65 01/16/2017    HDL 41 01/16/2017    TRIG 197 (H) 01/16/2017       Lab Results   Component Value Date    TSH 3.500 11/01/2017       Lab Results   Component Value Date    WBC 10.27 09/08/2017     HGB 14.9 09/08/2017    HGB 15.8 09/06/2017    HGB 15.2 01/16/2017     (L) 09/08/2017       Imaging:         Medical Tests:         Summary of old records / correspondence / consultant report:         Request outside records:         ALLERGIES  No Known Allergies     MEDICATIONS  Current Outpatient Prescriptions   Medication Sig Dispense Refill   • allopurinol (ZYLOPRIM) 300 MG tablet Take 1 tablet by mouth Daily. 90 tablet 3   • amitriptyline (ELAVIL) 100 MG tablet Take 1 tablet by mouth Every Night. 90 tablet 3   • amitriptyline (ELAVIL) 25 MG tablet Take 2 tablets by mouth At Night As Needed for Sleep. Take 1 tablet by mouth NIGHTLY IN ADDITION TO  MG TAB IF NEEDED 90 tablet 3   • enalapril (VASOTEC) 2.5 MG tablet Take 1 tablet by mouth Daily. 90 tablet 3     No current facility-administered medications for this visit.        PFSH:     The following portions of the patient's history were reviewed and updated as appropriate: Allergies / Current Medications / Past Medical History / Surgical History / Social History / Family History    PROBLEM LIST   Patient Active Problem List   Diagnosis   • Essential hypertension   • Irritable bowel syndrome   • Gout   • Reduced libido   • Carpal tunnel syndrome   • Abdominal hernia   • Osteoporosis   • Other specific arthropathies, not elsewhere classified, unspecified site   • Cervical radiculopathy   • Chronic insomnia   • Carotid artery calcification   • Abnormal finding on imaging   • Peripheral polyneuropathy   • Osteonecrosis of left hip   • Pain and swelling of left lower leg   • Venous insufficiency of both lower extremities   • Foot erythema   • Swelling of foot joint, left   • Pain and swelling of toe of left foot   • Foot pain, left   • Avascular necrosis       PAST MEDICAL HISTORY  Past Medical History:   Diagnosis Date   • Adverse effect of anesthesia     PATIENT STATES WAKES UP ANGRY & COMBATIVE   • Arthritis    • Diverticulosis    • Gout    •  Hiatal hernia    • Hypertension    • IBS (irritable bowel syndrome)        SURGICAL HISTORY  Past Surgical History:   Procedure Laterality Date   • EXTENSOR TENDON OF FOREARM / WRIST REPAIR Bilateral    • FOOT FRACTURE SURGERY     • HERNIA REPAIR      BILATERAL INGUINAL   • UMBILICAL HERNIA REPAIR N/A 1/16/2017    Procedure: UMBILICAL HERNIA REPAIR;  Surgeon: Fernando Montes Jr., MD;  Location: [facility];  Service:        SOCIAL HISTORY  Social History     Social History   • Marital status:      Occupational History   • Business owner      Social History Main Topics   • Smoking status: Former Smoker     Packs/day: 1.50     Years: 20.00     Types: Cigarettes     Quit date: 9/7/2016   • Smokeless tobacco: Never Used   • Alcohol use Yes      Comment: 1 GLASS WINE PER NIGHT   • Drug use: No   • Sexual activity: Defer     Other Topics Concern   • Not on file       FAMILY HISTORY  Family History   Problem Relation Age of Onset   • No Known Problems Mother    • No Known Problems Father          **Dragon Disclaimer:   Much of this encounter note is an electronic transcription/translation of spoken language to printed text. The electronic translation of spoken language may permit erroneous, or at times, nonsensical words or phrases to be inadvertently transcribed. Although I have reviewed the note for such errors, some may still exist.

## 2018-04-10 LAB
HGB A MFR BLD: 97.5 % (ref 96.4–98.8)
HGB A2 MFR BLD COLUMN CHROM: 2.5 % (ref 1.8–3.2)
HGB C MFR BLD: 0 %
HGB F MFR BLD: 0 % (ref 0–2)
HGB FRACT BLD-IMP: NORMAL
HGB S BLD QL SOLY: NEGATIVE
HGB S MFR BLD: 0 %

## 2018-04-11 ENCOUNTER — LAB (OUTPATIENT)
Dept: LAB | Facility: HOSPITAL | Age: 53
End: 2018-04-11

## 2018-04-11 ENCOUNTER — CONSULT (OUTPATIENT)
Dept: ONCOLOGY | Facility: CLINIC | Age: 53
End: 2018-04-11

## 2018-04-11 ENCOUNTER — RESULTS ENCOUNTER (OUTPATIENT)
Dept: INTERNAL MEDICINE | Age: 53
End: 2018-04-11

## 2018-04-11 ENCOUNTER — HOSPITAL ENCOUNTER (OUTPATIENT)
Dept: BONE DENSITY | Facility: HOSPITAL | Age: 53
Discharge: HOME | End: 2018-04-11
Admitting: INTERNAL MEDICINE

## 2018-04-11 ENCOUNTER — HOSPITAL ENCOUNTER (OUTPATIENT)
Dept: BONE DENSITY | Facility: HOSPITAL | Age: 53
End: 2018-04-11

## 2018-04-11 VITALS
HEIGHT: 74 IN | SYSTOLIC BLOOD PRESSURE: 112 MMHG | TEMPERATURE: 98.6 F | RESPIRATION RATE: 16 BRPM | BODY MASS INDEX: 29.26 KG/M2 | OXYGEN SATURATION: 95 % | WEIGHT: 228 LBS | DIASTOLIC BLOOD PRESSURE: 78 MMHG | HEART RATE: 93 BPM

## 2018-04-11 DIAGNOSIS — M87.9 OSTEONECROSIS OF LEFT HIP (HCC): ICD-10-CM

## 2018-04-11 DIAGNOSIS — M87.9 OSTEONECROSIS OF LEFT HIP (HCC): Primary | ICD-10-CM

## 2018-04-11 DIAGNOSIS — F51.04 CHRONIC INSOMNIA: ICD-10-CM

## 2018-04-11 DIAGNOSIS — M87.00 AVASCULAR NECROSIS (HCC): ICD-10-CM

## 2018-04-11 DIAGNOSIS — I10 ESSENTIAL HYPERTENSION: Primary | ICD-10-CM

## 2018-04-11 LAB
BASOPHILS # BLD AUTO: 0.02 10*3/MM3 (ref 0–0.1)
BASOPHILS NFR BLD AUTO: 0.2 % (ref 0–1.1)
DEPRECATED RDW RBC AUTO: 43.9 FL (ref 37–49)
EOSINOPHIL # BLD AUTO: 0.13 10*3/MM3 (ref 0–0.36)
EOSINOPHIL NFR BLD AUTO: 1.3 % (ref 1–5)
ERYTHROCYTE [DISTWIDTH] IN BLOOD BY AUTOMATED COUNT: 12.6 % (ref 11.7–14.5)
ERYTHROCYTE [SEDIMENTATION RATE] IN BLOOD: 2 MM/HR (ref 0–20)
F5 GENE MUT ANL BLD/T: NORMAL
FACTOR II, DNA ANALYSIS: NORMAL
HCT VFR BLD AUTO: 44.5 % (ref 40–49)
HGB BLD-MCNC: 15.4 G/DL (ref 13.5–16.5)
IMM GRANULOCYTES # BLD: 0.04 10*3/MM3 (ref 0–0.03)
IMM GRANULOCYTES NFR BLD: 0.4 % (ref 0–0.5)
LYMPHOCYTES # BLD AUTO: 2.38 10*3/MM3 (ref 1–3.5)
LYMPHOCYTES NFR BLD AUTO: 23.7 % (ref 20–49)
MCH RBC QN AUTO: 33 PG (ref 27–33)
MCHC RBC AUTO-ENTMCNC: 34.6 G/DL (ref 32–35)
MCV RBC AUTO: 95.5 FL (ref 83–97)
MONOCYTES # BLD AUTO: 0.71 10*3/MM3 (ref 0.25–0.8)
MONOCYTES NFR BLD AUTO: 7.1 % (ref 4–12)
NEUTROPHILS # BLD AUTO: 6.76 10*3/MM3 (ref 1.5–7)
NEUTROPHILS NFR BLD AUTO: 67.3 % (ref 39–75)
NRBC BLD MANUAL-RTO: 0 /100 WBC (ref 0–0)
PLATELET # BLD AUTO: 180 10*3/MM3 (ref 150–375)
PMV BLD AUTO: 9.2 FL (ref 8.9–12.1)
RBC # BLD AUTO: 4.66 10*6/MM3 (ref 4.3–5.5)
WBC NRBC COR # BLD: 10.04 10*3/MM3 (ref 4–10)

## 2018-04-11 PROCEDURE — 85025 COMPLETE CBC W/AUTO DIFF WBC: CPT | Performed by: INTERNAL MEDICINE

## 2018-04-11 PROCEDURE — 99245 OFF/OP CONSLTJ NEW/EST HI 55: CPT | Performed by: INTERNAL MEDICINE

## 2018-04-11 PROCEDURE — 81241 F5 GENE: CPT | Performed by: INTERNAL MEDICINE

## 2018-04-11 PROCEDURE — 85306 CLOT INHIBIT PROT S FREE: CPT | Performed by: INTERNAL MEDICINE

## 2018-04-11 PROCEDURE — 85300 ANTITHROMBIN III ACTIVITY: CPT | Performed by: INTERNAL MEDICINE

## 2018-04-11 PROCEDURE — 85303 CLOT INHIBIT PROT C ACTIVITY: CPT | Performed by: INTERNAL MEDICINE

## 2018-04-11 PROCEDURE — 36415 COLL VENOUS BLD VENIPUNCTURE: CPT | Performed by: INTERNAL MEDICINE

## 2018-04-11 PROCEDURE — 77080 DXA BONE DENSITY AXIAL: CPT

## 2018-04-11 PROCEDURE — 36416 COLLJ CAPILLARY BLOOD SPEC: CPT | Performed by: INTERNAL MEDICINE

## 2018-04-11 PROCEDURE — 81240 F2 GENE: CPT | Performed by: INTERNAL MEDICINE

## 2018-04-11 PROCEDURE — 85652 RBC SED RATE AUTOMATED: CPT | Performed by: INTERNAL MEDICINE

## 2018-04-11 RX ORDER — AMITRIPTYLINE HYDROCHLORIDE 100 MG/1
100 TABLET, FILM COATED ORAL NIGHTLY PRN
COMMUNITY
End: 2018-04-13 | Stop reason: SDUPTHER

## 2018-04-11 NOTE — PROGRESS NOTES
Subjective     REASON FOR CONSULTATION:  Osteonecrosis  Provide an opinion on any further workup or treatment                             REQUESTING PHYSICIAN:  Thomas Cotter M.D.    RECORDS OBTAINED:  Records of the patients history including those obtained from the referring provider were reviewed and summarized in detail.    HISTORY OF PRESENT ILLNESS:  The patient is a 52 y.o. year old male who is here for an opinion about the above issue.  He is referred to us by his primary care office for evaluation for any potential hematologic causes of osteonecrosis.  He has bilateral avascular necrosis of the hips and also has developed significant osteonecrosis with pathologic fractures of the left foot.  He has been undergoing evaluation by orthopedic surgery.    He does report heavy exposure to steroids in the past primarily as treatment for gout by his rheumatologist and also with a history of steroid injections repeatedly for ischial bursitis.  He is not a heavy drinker.    His workup thus far from other offices has included a hemoglobin electrophoresis which was negative for hemoglobinopathy.  He had a serum protein electrophoresis that did not show any monoclonal paraprotein.  He had a Doppler of the left lower extremity with no evidence of thrombosis.    History of Present Illness     Past Medical History:   Diagnosis Date   • Adverse effect of anesthesia     PATIENT STATES WAKES UP ANGRY & COMBATIVE   • Arthritis    • CTS (carpal tunnel syndrome)    • Diverticulosis    • Gout    • H/O Right leg pain 2011   • Hiatal hernia    • Hypertension    • IBS (irritable bowel syndrome)    • Osteoporosis         Past Surgical History:   Procedure Laterality Date   • EXTENSOR TENDON OF FOREARM / WRIST REPAIR Bilateral    • FOOT FRACTURE SURGERY     • FOOT NEUROMA SURGERY Right 2003    Mccabe's neuroma   • HERNIA REPAIR      BILATERAL INGUINAL   • OTHER SURGICAL HISTORY Right 2007    Elbow surgery   • UMBILICAL HERNIA REPAIR N/A  1/16/2017    Procedure: UMBILICAL HERNIA REPAIR;  Surgeon: Fernando Montes Jr., MD;  Location: [facility] OR;  Service:         Current Outpatient Prescriptions on File Prior to Visit   Medication Sig Dispense Refill   • allopurinol (ZYLOPRIM) 300 MG tablet Take 1 tablet by mouth Daily. 90 tablet 3   • amitriptyline (ELAVIL) 25 MG tablet Take 2 tablets by mouth At Night As Needed for Sleep. Take 1 tablet by mouth NIGHTLY IN ADDITION TO  MG TAB IF NEEDED 90 tablet 3   • enalapril (VASOTEC) 2.5 MG tablet Take 1 tablet by mouth Daily. 90 tablet 3   • gabapentin (NEURONTIN) 300 MG capsule Take 1 capsule by mouth 3 (Three) Times a Day. 45 capsule 1   • [DISCONTINUED] amitriptyline (ELAVIL) 100 MG tablet Take 1 tablet by mouth Every Night. 90 tablet 3     No current facility-administered medications on file prior to visit.         ALLERGIES:  No Known Allergies     Social History     Social History   • Marital status:      Spouse name: Padma     Occupational History   • Business owner      Social History Main Topics   • Smoking status: Former Smoker     Packs/day: 1.50     Years: 20.00     Types: Cigarettes     Quit date: 9/7/2016   • Smokeless tobacco: Never Used   • Alcohol use Yes      Comment: 1 GLASS WINE PER NIGHT   • Drug use: No   • Sexual activity: Defer     Other Topics Concern   • Not on file        Family History   Problem Relation Age of Onset   • No Known Problems Mother    • Gout Father    • Cancer Sister    • Psoriasis Sister         Review of Systems   Constitutional: Negative for activity change, chills, fatigue and fever.   HENT: Negative for mouth sores, trouble swallowing and voice change.    Eyes: Negative for pain and visual disturbance.   Respiratory: Negative for cough, shortness of breath and wheezing.    Cardiovascular: Negative for chest pain and palpitations.   Gastrointestinal: Negative for abdominal pain, constipation, diarrhea, nausea and vomiting.   Genitourinary: Negative  "for difficulty urinating, frequency and urgency.   Musculoskeletal: Positive for arthralgias, gait problem and joint swelling.        Avascular necrosis of bilateral hips and left foot   Skin: Negative for rash.   Neurological: Negative for dizziness, seizures, weakness and headaches.   Hematological: Negative for adenopathy. Does not bruise/bleed easily.   Psychiatric/Behavioral: Negative for behavioral problems and confusion. The patient is not nervous/anxious.         Objective     Vitals:    04/11/18 1150   BP: 112/78   Pulse: 93   Resp: 16   Temp: 98.6 °F (37 °C)   TempSrc: Oral   SpO2: 95%   Weight: 103 kg (228 lb)  Comment: pt stated   Height: 188 cm (74.02\")  Comment: pt stated   PainSc: 8  Comment: L Hip     Current Status 4/11/2018   ECOG score 1       Physical Exam   Constitutional: He is oriented to person, place, and time. He appears well-developed and well-nourished. No distress.   HENT:   Head: Normocephalic.   Eyes: Conjunctivae and EOM are normal. Pupils are equal, round, and reactive to light. No scleral icterus.   Neck: Normal range of motion. Neck supple. No JVD present. No thyromegaly present.   Cardiovascular: Normal rate and regular rhythm.  Exam reveals no gallop and no friction rub.    No murmur heard.  Pulmonary/Chest: Effort normal and breath sounds normal. He has no wheezes. He has no rales.   Abdominal: Soft. He exhibits no distension and no mass. There is no tenderness.   Musculoskeletal: He exhibits edema, tenderness and deformity.   The left foot is in an immobilizer.   Lymphadenopathy:     He has no cervical adenopathy.   Neurological: He is alert and oriented to person, place, and time. He has normal reflexes. No cranial nerve deficit.   Skin: Skin is warm and dry. No rash noted. No erythema.   Mildly plethoric.   Psychiatric: He has a normal mood and affect. His behavior is normal. Judgment normal.         RECENT LABS:  Hematology WBC   Date Value Ref Range Status   04/11/2018 " 10.04 (H) 4.00 - 10.00 10*3/mm3 Final     RBC   Date Value Ref Range Status   04/11/2018 4.66 4.30 - 5.50 10*6/mm3 Final     Hemoglobin   Date Value Ref Range Status   04/11/2018 15.4 13.5 - 16.5 g/dL Final     Hematocrit   Date Value Ref Range Status   04/11/2018 44.5 40.0 - 49.0 % Final     Platelets   Date Value Ref Range Status   04/11/2018 180 150 - 375 10*3/mm3 Final        Hgb Solubility Negative Negative    Hgb F Quant 0.0 - 2.0 % 0.0    Hgb A 96.4 - 98.8 % 97.5    Hgb S 0.0 % 0.0    Hgb C 0.0 % 0.0    Hgb A2 Quant 1.8 - 3.2 % 2.5    Hgb Interp.  Comment    Comments: Normal adult hemoglobin present     MRI LEFT FOOT 3/15/2018  IMPRESSION:  Subchondral stress fracture 4th metatarsal head with subtle cortical  flattening. Stress reaction distal 2nd, 3rd, 5th metatarsals. Early  phase osteonecrosis is also in the differential diagnoses.      Assessment/Plan     1.  Osteonecrosis of the hips and left foot in a gentleman with significant steroid exposure in the past.  We suspect that this may be more of an etiologic factor in any underlying hematologic disorder.  His blood counts are essentially stable with no erythrocytosis.  Additional lab studies and be ordered to try to rule out any underlying hematologic cause.  2.  Chronic pain and immobility due to osteonecrosis.  3.  Facial plethora but no significant erythrocytosis to suggest underlying polycythemia or altered oxygen affinity hemoglobinopathy.    Recommendations  1.  The patient will return to our lab today for additional testing including hypercoagulable workup including factor V Leiden mutation, prothrombin gene mutation, protein C and protein S studies, lupus anticoagulant and anti-cardiolipin antibodies.  Also an PHOEBE and erythrocyte sedimentation rate will be obtained.  Serum protein electrophoresis has already been performed but we will evaluate for free serum light chains.  Alpha thalassemia assay will also be performed.  2.  We have not scheduled  routine follow-up for the patient and her office but if there are any significant abnormalities noted on his laboratory evaluation we can contact him by phone and certainly would share these abnormalities with his primary care physician, Dr. Cotter, and his orthopedic surgeons.    Thanks for allowing us to see this nice gentleman in consultation.

## 2018-04-12 ENCOUNTER — TELEPHONE (OUTPATIENT)
Dept: INTERNAL MEDICINE | Age: 53
End: 2018-04-12

## 2018-04-12 DIAGNOSIS — M87.00 AVASCULAR NECROSIS (HCC): Primary | ICD-10-CM

## 2018-04-12 DIAGNOSIS — M85.88 OSTEOPENIA OF SPINE: Primary | ICD-10-CM

## 2018-04-12 LAB
ANA SER QL: NEGATIVE
AT III PPP CHRO-ACNC: 97 % (ref 90–134)
C3 SERPL-MCNC: 123 MG/DL (ref 82–167)
C4 SERPL-MCNC: 31 MG/DL (ref 14–44)
CARDIOLIPIN IGG SER IA-ACNC: <9 GPL U/ML (ref 0–14)
CARDIOLIPIN IGM SER IA-ACNC: 11 MPL U/ML (ref 0–12)
KAPPA LC SERPL-MCNC: 20.1 MG/L (ref 3.3–19.4)
KAPPA LC/LAMBDA SER: 1 {RATIO} (ref 0.26–1.65)
LAMBDA LC FREE SERPL-MCNC: 20.2 MG/L (ref 5.7–26.3)
PROT C PPP-ACNC: 105 % (ref 86–163)
PROT S ACT/NOR PPP: 110 % (ref 70–127)
PROT S FREE PPP-ACNC: >150 % (ref 49–138)

## 2018-04-12 RX ORDER — AMITRIPTYLINE HYDROCHLORIDE 25 MG/1
TABLET, FILM COATED ORAL
Qty: 90 TABLET | Refills: 0 | Status: SHIPPED | OUTPATIENT
Start: 2018-04-12 | End: 2018-05-26 | Stop reason: SDUPTHER

## 2018-04-12 NOTE — TELEPHONE ENCOUNTER
Pt is requesting a referral for [facility]Pain Control in re: to his broken LT hip & 3 broken bones in his LT foot. Pt complains of severe pain.    Pt does not like the Gabapentin rx bc he doesn't feel like  Himself; however is still taking the rx due to it is the only thing helping his pain. Pt prefers to explore other avenues vs taking medications.    Pls advise.    Pt can be reached #[phone].

## 2018-04-12 NOTE — TELEPHONE ENCOUNTER
Please arrange consultation with pain management as requested.  Diagnosis avascular necrosis with fracture.

## 2018-04-13 DIAGNOSIS — I10 ESSENTIAL HYPERTENSION: ICD-10-CM

## 2018-04-13 DIAGNOSIS — M87.00 AVASCULAR NECROSIS (HCC): ICD-10-CM

## 2018-04-13 LAB
B2 GLYCOPROT1 IGA SER-ACNC: <9 GPI IGA UNITS (ref 0–25)
B2 GLYCOPROT1 IGG SER-ACNC: <9 GPI IGG UNITS (ref 0–20)
B2 GLYCOPROT1 IGM SER-ACNC: <9 GPI IGM UNITS (ref 0–32)
LA NT PLATELET PPP: 30.6 SEC (ref 0–51.9)
LUPUS ANTICOAGULANT REFLEX: NORMAL
SCREEN DRVVT: 36 SEC (ref 0–47)

## 2018-04-13 RX ORDER — AMITRIPTYLINE HYDROCHLORIDE 100 MG/1
100 TABLET, FILM COATED ORAL NIGHTLY PRN
Qty: 90 TABLET | Refills: 0 | Status: SHIPPED | OUTPATIENT
Start: 2018-04-13 | End: 2018-06-01

## 2018-04-13 RX ORDER — GABAPENTIN 300 MG/1
300 CAPSULE ORAL 3 TIMES DAILY
Qty: 45 CAPSULE | Refills: 1 | Status: CANCELLED | OUTPATIENT
Start: 2018-04-13

## 2018-04-13 RX ORDER — ENALAPRIL MALEATE 2.5 MG/1
2.5 TABLET ORAL DAILY
Qty: 90 TABLET | Refills: 1 | Status: SHIPPED | OUTPATIENT
Start: 2018-04-13 | End: 2018-08-30 | Stop reason: SDUPTHER

## 2018-04-13 RX ORDER — ALLOPURINOL 300 MG/1
300 TABLET ORAL DAILY
Qty: 90 TABLET | Refills: 1 | Status: SHIPPED | OUTPATIENT
Start: 2018-04-13 | End: 2018-08-30 | Stop reason: SDUPTHER

## 2018-04-16 ENCOUNTER — RESULTS ENCOUNTER (OUTPATIENT)
Dept: INTERNAL MEDICINE | Age: 53
End: 2018-04-16

## 2018-04-16 DIAGNOSIS — M85.88 OSTEOPENIA OF SPINE: ICD-10-CM

## 2018-04-17 ENCOUNTER — APPOINTMENT (OUTPATIENT)
Dept: LAB | Facility: HOSPITAL | Age: 53
End: 2018-04-17

## 2018-04-24 PROBLEM — E34.9 TESTOSTERONE DEFICIENCY: Status: ACTIVE | Noted: 2018-04-24

## 2018-04-24 LAB
25(OH)D3+25(OH)D2 SERPL-MCNC: 27.9 NG/ML (ref 30–100)
ALBUMIN SERPL-MCNC: 4.2 G/DL (ref 3.5–5.2)
ALBUMIN/GLOB SERPL: 1.9 G/DL
ALP SERPL-CCNC: 94 U/L (ref 39–117)
ALT SERPL-CCNC: 13 U/L (ref 1–41)
AST SERPL-CCNC: 12 U/L (ref 1–40)
BILIRUB SERPL-MCNC: 0.4 MG/DL (ref 0.1–1.2)
BUN SERPL-MCNC: 10 MG/DL (ref 6–20)
BUN/CREAT SERPL: 11.2 (ref 7–25)
CALCIUM SERPL-MCNC: 9.2 MG/DL (ref 8.6–10.5)
CHLORIDE SERPL-SCNC: 98 MMOL/L (ref 98–107)
CO2 SERPL-SCNC: 26.1 MMOL/L (ref 22–29)
CREAT SERPL-MCNC: 0.89 MG/DL (ref 0.76–1.27)
GFR SERPLBLD CREATININE-BSD FMLA CKD-EPI: 108 ML/MIN/1.73
GFR SERPLBLD CREATININE-BSD FMLA CKD-EPI: 89 ML/MIN/1.73
GLOBULIN SER CALC-MCNC: 2.2 GM/DL
GLUCOSE SERPL-MCNC: 98 MG/DL (ref 65–99)
POTASSIUM SERPL-SCNC: 4.6 MMOL/L (ref 3.5–5.2)
PROT SERPL-MCNC: 6.4 G/DL (ref 6–8.5)
PTH-INTACT SERPL-MCNC: 50 PG/ML (ref 15–65)
SODIUM SERPL-SCNC: 139 MMOL/L (ref 136–145)
TESTOST SERPL-MCNC: 251 NG/DL (ref 264–916)

## 2018-04-25 ENCOUNTER — APPOINTMENT (OUTPATIENT)
Dept: ONCOLOGY | Facility: CLINIC | Age: 53
End: 2018-04-25

## 2018-04-26 ENCOUNTER — OFFICE VISIT (OUTPATIENT)
Dept: INTERNAL MEDICINE | Age: 53
End: 2018-04-26

## 2018-04-26 ENCOUNTER — TELEPHONE (OUTPATIENT)
Dept: INTERNAL MEDICINE | Age: 53
End: 2018-04-26

## 2018-04-26 ENCOUNTER — TELEPHONE (OUTPATIENT)
Dept: ORTHOPEDIC SURGERY | Facility: CLINIC | Age: 53
End: 2018-04-26

## 2018-04-26 VITALS
HEIGHT: 74 IN | SYSTOLIC BLOOD PRESSURE: 130 MMHG | OXYGEN SATURATION: 96 % | DIASTOLIC BLOOD PRESSURE: 92 MMHG | WEIGHT: 229.2 LBS | TEMPERATURE: 97.8 F | HEART RATE: 93 BPM | BODY MASS INDEX: 29.41 KG/M2

## 2018-04-26 DIAGNOSIS — R60.0 LOCALIZED EDEMA: Primary | ICD-10-CM

## 2018-04-26 DIAGNOSIS — M79.672 LEFT FOOT PAIN: ICD-10-CM

## 2018-04-26 LAB
CALCIUM 24H UR-MCNC: 3.9 MG/DL
CALCIUM 24H UR-MRATE: 54.6 MG/24 HR (ref 100–300)

## 2018-04-26 PROCEDURE — 99214 OFFICE O/P EST MOD 30 MIN: CPT | Performed by: INTERNAL MEDICINE

## 2018-04-26 RX ORDER — FUROSEMIDE 20 MG/1
20 TABLET ORAL DAILY
Qty: 30 TABLET | Refills: 1 | Status: SHIPPED | OUTPATIENT
Start: 2018-04-26 | End: 2018-08-30 | Stop reason: ALTCHOICE

## 2018-04-26 NOTE — TELEPHONE ENCOUNTER
----- Message from Zachary Gonzalez MD sent at 4/26/2018  1:38 PM EDT -----  Regarding: appt.  Please call this pleasant gentleman and let him know that I have spoken with Dr. Cotter, and at that that I need to see him back in the office.    Please make this appointment for the next available date.    Please also advise this gentleman that I recommend that he should be on crutches, and that he may come by here and be fitted with crutches by [facility] if he does not already own crutches.    He does not need to wait for an appointment to see me before receiving the crutches.    Please explain to him that the crutches are to be used to keep his weight off of his foot as much as practically possible, but should not be used when going up and down stairs, or during other similarly risky activities.    Thank you.

## 2018-04-26 NOTE — TELEPHONE ENCOUNTER
----- Message from Olvin Cotter MD sent at 4/26/2018 12:49 PM EDT -----  I need to talk to Dr. Zachary Gonzalez about this patient today for relief.  JS

## 2018-04-26 NOTE — PROGRESS NOTES
"[facility]        Zachary LUCIO Washyanelis / 53 y.o. / male  04/26/2018      ASSESSMENT & PLAN:    Problem List Items Addressed This Visit     None      Visit Diagnoses     Localized edema    -  Primary    Relevant Medications    furosemide (LASIX) 20 MG tablet    Other Relevant Orders    Basic Metabolic Panel    Left foot pain            Orders Placed This Encounter   Procedures   • Basic Metabolic Panel       Summary/Discussion:  Number-one edema add Lasix 20 mg orally daily, no potassium be added since patient is on Vasotec.  Check BMP today.  Patient is aware the potential for orthostasis and will notify me if he becomes dizzy with this.  Recommend follow-up with me in 4 weeks for update on symptoms and repeat potassium kidney function.  He'll wear support stockings during the day, elevate his leg at night.. We'll continue to elevate during the day as much as he can.  Patient will keep appointment with pain management next week.    Foot pain secondary to underlying osteonecrosis and fractures along with concomitant swelling.  Will treat swelling as above, will then call orthopedics today and find out the timing of the operative intervention with her hip or foot.  We'll discuss with Dr. Gonzalez.    Addendum case discussed with Dr. Gonzalez who will see the patient within the next week.  His concern at this time is that the inflammation and edema in his foot needs to be resolved before we can proceed with hip surgery because patient must be fully ambulatory at the time of hip surgery.  Results of metabolic workup discussed with him.  Patient was notified an appointment with orthopedics is forthcoming.      Return in about 4 weeks (around 5/24/2018).    ____________________________________________________________________    VITALS    Visit Vitals  /92   Pulse 93   Temp 97.8 °F (36.6 °C)   Ht 188 cm (74.02\")   Wt 104 kg (229 lb 3.2 oz)   SpO2 96%   BMI 29.41 kg/m²       BP Readings from Last 3 Encounters: "   04/26/18 130/92   04/11/18 112/78   04/09/18 112/80     Wt Readings from Last 3 Encounters:   04/26/18 104 kg (229 lb 3.2 oz)   04/11/18 103 kg (228 lb)   04/09/18 103 kg (227 lb 9.6 oz)      Body mass index is 29.41 kg/m².    CC:  Main reason(s) for today's visit: Foot Pain (L foot)      HPI:     Patient has completed his evaluation with hematology.  Extensive serologic workup shows no evidence of hypercoagulable state nor hemoglobinopathy.  It was their conclusion that the extensive steroids used by the patient because of orthopedic issues, and gout issues are the cause behind the osteonecrosis.  Patient is also had workup for secondary causes  osteopenia/osteoporosis and no secondary causes were elucidated.  He does have a slight testosterone deficiency in the high 200s, which I doubt caused a significant degree of osteonecrosis.    Patient has not yet heard back from orthopedics.  I will need to talk to Dr. Gonzalez today and he can talk to Dr. Avila as to the timing of operative intervention.    Patient continues to have pain, however it seems to be worse in his foot and his hip area.  Pain seems to be helped with the gabapentin.  He does have an appointment with pain management, and is made clear to them he wants no steroids nor narcotics for adjunctive pain relief.  I suspect that if we can improve the edema in his left foot, this will also help with pain.  He is using a scooter, mood, and request a handicap parking permit for temporary basis.  That will be accomplished today.    Patient Care Team:  Olvin Cotter MD as PCP - General  Olvin Cotter MD as Referring Physician (Internal Medicine)  ____________________________________________________________________    REVIEW OF SYSTEMS    Review of Systems   Constitutional: Negative for chills, diaphoresis and fever.   Skin: Negative for rash.         PHYSICAL EXAMINATION    Physical Exam   Constitutional: He is oriented to person, place, and time. He appears  well-developed.   Musculoskeletal: He exhibits edema.   Neurological: He is alert and oriented to person, place, and time. No sensory deficit.   Vibration sense intact, monofilament testing intact left foot.   Skin: Skin is warm and dry.   Psychiatric: He has a normal mood and affect. His behavior is normal. Judgment and thought content normal.   Nursing note and vitals reviewed.      REVIEWED DATA:    Labs:     Lab Results   Component Value Date     04/23/2018    K 4.6 04/23/2018    AST 12 04/23/2018    ALT 13 04/23/2018    BUN 10 04/23/2018    CREATININE 0.89 04/23/2018    CREATININE 0.81 09/08/2017    CREATININE 0.90 09/07/2017    EGFRIFNONA 89 04/23/2018    EGFRIFAFRI 108 04/23/2018       Lab Results   Component Value Date    GLUCOSE 89 09/08/2017    GLUCOSE 107 (H) 01/16/2017       Lab Results   Component Value Date    LDL 65 01/16/2017    HDL 41 01/16/2017    TRIG 197 (H) 01/16/2017       Lab Results   Component Value Date    TSH 3.500 11/01/2017       Lab Results   Component Value Date    WBC 10.04 (H) 04/11/2018    HGB 15.4 04/11/2018    HGB 14.9 09/08/2017    HGB 15.8 09/06/2017     04/11/2018       Imaging:         Medical Tests:         Summary of old records / correspondence / consultant report:         Request outside records:         ALLERGIES  No Known Allergies     MEDICATIONS  Current Outpatient Prescriptions   Medication Sig Dispense Refill   • allopurinol (ZYLOPRIM) 300 MG tablet Take 1 tablet by mouth Daily. 90 tablet 1   • amitriptyline (ELAVIL) 100 MG tablet Take 1 tablet by mouth At Night As Needed for Sleep. 90 tablet 0   • amitriptyline (ELAVIL) 25 MG tablet TAKE TWO TABLETS BY MOUTH EVERY NIGHT AS NEEDED FOR SLEEP. TAKE ONE TABLET BY MOUTH IN ADDITION TO  MG TABLET IF NEEDED 90 tablet 0   • enalapril (VASOTEC) 2.5 MG tablet Take 1 tablet by mouth Daily. 90 tablet 1   • furosemide (LASIX) 20 MG tablet Take 1 tablet by mouth Daily. 30 tablet 1   • gabapentin (NEURONTIN)  300 MG capsule Take 1 capsule by mouth 3 (Three) Times a Day. 45 capsule 1     No current facility-administered medications for this visit.        PFSH:     The following portions of the patient's history were reviewed and updated as appropriate: Allergies / Current Medications / Past Medical History / Surgical History / Social History / Family History    PROBLEM LIST   Patient Active Problem List   Diagnosis   • Essential hypertension   • Irritable bowel syndrome   • Gout   • Reduced libido   • Carpal tunnel syndrome   • Abdominal hernia   • Osteoporosis   • Other specific arthropathies, not elsewhere classified, unspecified site   • Cervical radiculopathy   • Chronic insomnia   • Carotid artery calcification   • Abnormal finding on imaging   • Peripheral polyneuropathy   • Osteonecrosis of left hip   • Pain and swelling of left lower leg   • Venous insufficiency of both lower extremities   • Foot erythema   • Swelling of foot joint, left   • Pain and swelling of toe of left foot   • Foot pain, left   • Avascular necrosis   • Osteopenia of spine   • Testosterone deficiency       PAST MEDICAL HISTORY  Past Medical History:   Diagnosis Date   • Adverse effect of anesthesia     PATIENT STATES WAKES UP ANGRY & COMBATIVE   • Arthritis    • CTS (carpal tunnel syndrome)    • Diverticulosis    • Gout    • H/O Right leg pain 2011   • Hiatal hernia    • Hypertension    • IBS (irritable bowel syndrome)    • Osteoporosis        SURGICAL HISTORY  Past Surgical History:   Procedure Laterality Date   • EXTENSOR TENDON OF FOREARM / WRIST REPAIR Bilateral    • FOOT FRACTURE SURGERY     • FOOT NEUROMA SURGERY Right 2003    Mccabe's neuroma   • HERNIA REPAIR      BILATERAL INGUINAL   • OTHER SURGICAL HISTORY Right 2007    Elbow surgery   • UMBILICAL HERNIA REPAIR N/A 1/16/2017    Procedure: UMBILICAL HERNIA REPAIR;  Surgeon: Fernando Montes Jr., MD;  Location: [facility] OR;  Service:        SOCIAL HISTORY  Social History     Social  History   • Marital status:      Spouse name: Padma   • Years of education: College     Occupational History   • Business owner      [employer]     Social History Main Topics   • Smoking status: Former Smoker     Packs/day: 1.50     Years: 20.00     Types: Cigarettes     Quit date: 9/7/2016   • Smokeless tobacco: Never Used   • Alcohol use Yes      Comment: 1 GLASS WINE PER NIGHT   • Drug use: No   • Sexual activity: Defer     Other Topics Concern   • Not on file       FAMILY HISTORY  Family History   Problem Relation Age of Onset   • No Known Problems Mother    • Gout Father    • Psoriasis Sister    • Lymphoma Sister    • Hypertension Brother          **Tyon Disclaimer:   Much of this encounter note is an electronic transcription/translation of spoken language to printed text. The electronic translation of spoken language may permit erroneous, or at times, nonsensical words or phrases to be inadvertently transcribed. Although I have reviewed the note for such errors, some may still exist.

## 2018-04-27 LAB
BUN SERPL-MCNC: 8 MG/DL (ref 6–20)
BUN/CREAT SERPL: 9.8 (ref 7–25)
CALCIUM SERPL-MCNC: 9.4 MG/DL (ref 8.6–10.5)
CHLORIDE SERPL-SCNC: 100 MMOL/L (ref 98–107)
CO2 SERPL-SCNC: 28.6 MMOL/L (ref 22–29)
CREAT SERPL-MCNC: 0.82 MG/DL (ref 0.76–1.27)
GFR SERPLBLD CREATININE-BSD FMLA CKD-EPI: 118 ML/MIN/1.73
GFR SERPLBLD CREATININE-BSD FMLA CKD-EPI: 98 ML/MIN/1.73
GLUCOSE SERPL-MCNC: 82 MG/DL (ref 65–99)
POTASSIUM SERPL-SCNC: 4.6 MMOL/L (ref 3.5–5.2)
SODIUM SERPL-SCNC: 141 MMOL/L (ref 136–145)

## 2018-05-02 ENCOUNTER — OFFICE VISIT (OUTPATIENT)
Dept: PAIN MEDICINE | Facility: CLINIC | Age: 53
End: 2018-05-02

## 2018-05-02 ENCOUNTER — RESULTS ENCOUNTER (OUTPATIENT)
Dept: PAIN MEDICINE | Facility: CLINIC | Age: 53
End: 2018-05-02

## 2018-05-02 VITALS
OXYGEN SATURATION: 94 % | WEIGHT: 221 LBS | DIASTOLIC BLOOD PRESSURE: 91 MMHG | HEIGHT: 74 IN | SYSTOLIC BLOOD PRESSURE: 128 MMHG | HEART RATE: 107 BPM | TEMPERATURE: 98.1 F | BODY MASS INDEX: 28.36 KG/M2 | RESPIRATION RATE: 16 BRPM

## 2018-05-02 DIAGNOSIS — M54.12 CERVICAL RADICULOPATHY: ICD-10-CM

## 2018-05-02 DIAGNOSIS — G62.9 PERIPHERAL POLYNEUROPATHY: ICD-10-CM

## 2018-05-02 DIAGNOSIS — M87.00 AVASCULAR NECROSIS (HCC): ICD-10-CM

## 2018-05-02 DIAGNOSIS — G90.522 COMPLEX REGIONAL PAIN SYNDROME I OF LEFT LOWER LIMB: ICD-10-CM

## 2018-05-02 DIAGNOSIS — M79.89 PAIN AND SWELLING OF LEFT LOWER LEG: ICD-10-CM

## 2018-05-02 DIAGNOSIS — M79.662 PAIN AND SWELLING OF LEFT LOWER LEG: ICD-10-CM

## 2018-05-02 DIAGNOSIS — G62.9 PERIPHERAL POLYNEUROPATHY: Primary | ICD-10-CM

## 2018-05-02 LAB
POC AMPHETAMINES: NEGATIVE
POC BARBITURATES: NEGATIVE
POC BENZODIAZEPHINES: NEGATIVE
POC COCAINE: NEGATIVE
POC METHADONE: NEGATIVE
POC METHAMPHETAMINE SCREEN URINE: NEGATIVE
POC OPIATES: NEGATIVE
POC OXYCODONE: NEGATIVE
POC PHENCYCLIDINE: NEGATIVE
POC PROPOXYPHENE: NEGATIVE
POC THC: NEGATIVE
POC TRICYCLIC ANTIDEPRESSANTS: POSITIVE

## 2018-05-02 PROCEDURE — 99203 OFFICE O/P NEW LOW 30 MIN: CPT | Performed by: ANESTHESIOLOGY

## 2018-05-02 PROCEDURE — 80305 DRUG TEST PRSMV DIR OPT OBS: CPT | Performed by: ANESTHESIOLOGY

## 2018-05-02 NOTE — PROGRESS NOTES
CHIEF COMPLAINT  Pt is referred here per Dr Olvin Cotter for L hip and L foot pain,since about 4 months ago. Pt had previously received many bilateral gluteal injections per Dr Todd Lee from 2015 until 12/17 for Ischial Bursitis (bilateral gluteal pain).  Subsequently , Pt was DXd with Avascular Necrosis at the outset of  L hip and L foot pain. Pt no longer has gluteal pain.  Hx of Tramadol (DCd ,caused drowsiness) Pt now taking gabapentin which does provide moderate L hip/foot pain.Pt has had no PT for this.    Pt states the plan is for L THR at some point when the hip and foot pain is better managed.    Subjective   Zachary Gerard is a 53 y.o. male.   He presents to the office for evaluation of left hip and also left foot pain. He was referred here by Dr. Cotter.     The patient relates that he received many steroid injections in the past... Every 1-2 months for about 2 yrs.    The history of avascular necrosis of hip is noted on records review.      He complanis of throbbing and shooting pains to the left foot as well as lateral and posterior hips.  Moderate to severe pain  For >3 months, constant, makes him feel nervous and restless and affects sleep.      Khoury inventory normal with a score of 5.    Hip Pain    The incident occurred more than 1 week ago. The pain is present in the left hip. The quality of the pain is described as aching (throbbing). The pain is moderate. The pain has been constant since onset. Associated symptoms include a loss of motion. Pertinent negatives include no numbness. The symptoms are aggravated by movement and weight bearing. He has tried acetaminophen, ice, non-weight bearing and NSAIDs (ivonne) for the symptoms. The treatment provided mild relief.   Foot Pain   The current episode started more than 1 month ago. The problem occurs constantly. The problem has been gradually improving. Pertinent negatives include no chest pain, nausea or numbness. The treatment provided mild (gabapn)  relief.        PEG Assessment   What number best describes your pain on average in the past week?7  What number best describes how, during the past week, pain has interfered with your enjoyment of life?7  What number best describes how, during the past week, pain has interfered with your general activity?  7        Current Outpatient Prescriptions:   •  allopurinol (ZYLOPRIM) 300 MG tablet, Take 1 tablet by mouth Daily., Disp: 90 tablet, Rfl: 1  •  amitriptyline (ELAVIL) 100 MG tablet, Take 1 tablet by mouth At Night As Needed for Sleep., Disp: 90 tablet, Rfl: 0  •  amitriptyline (ELAVIL) 25 MG tablet, TAKE TWO TABLETS BY MOUTH EVERY NIGHT AS NEEDED FOR SLEEP. TAKE ONE TABLET BY MOUTH IN ADDITION TO  MG TABLET IF NEEDED, Disp: 90 tablet, Rfl: 0  •  enalapril (VASOTEC) 2.5 MG tablet, Take 1 tablet by mouth Daily., Disp: 90 tablet, Rfl: 1  •  furosemide (LASIX) 20 MG tablet, Take 1 tablet by mouth Daily., Disp: 30 tablet, Rfl: 1  •  Gabapentin Enacarbil ER (HORIZANT) 600 MG tablet controlled-release, Take 600 mg by mouth Every 12 (Twelve) Hours., Disp: 60 tablet, Rfl: 2    The following portions of the patient's history were reviewed and updated as appropriate: allergies, current medications, past family history, past medical history, past social history, past surgical history and problem list.      REVIEW OF PERTINENT MEDICAL DATA        Review of Systems   Constitutional: Positive for activity change (decreased). Negative for appetite change.   HENT: Negative for trouble swallowing.    Eyes: Negative for visual disturbance.   Respiratory: Negative for apnea, chest tightness and shortness of breath.    Cardiovascular: Negative for chest pain.   Gastrointestinal: Negative for constipation, diarrhea and nausea.   Genitourinary: Negative for difficulty urinating and dysuria.   Musculoskeletal: Positive for gait problem.   Neurological: Negative for dizziness, light-headedness and numbness.  "  Psychiatric/Behavioral: Positive for sleep disturbance. Negative for suicidal ideas.         Vitals:    05/02/18 1414   BP: 128/91   Pulse: 107   Resp: 16   Temp: 98.1 °F (36.7 °C)   SpO2: 94%   Weight: 100 kg (221 lb)   Height: 188 cm (74.02\")   PainSc: 6  Comment: L hip,L foot pain ranges from 6-8/10   PainLoc: Hip         Objective   Physical Exam   Constitutional: He is oriented to person, place, and time. Vital signs are normal. He appears well-developed and well-nourished.  Non-toxic appearance. No distress.   HENT:   Head: Normocephalic and atraumatic.   Right Ear: Hearing and external ear normal.   Left Ear: Hearing and external ear normal.   Nose: Nose normal.   Eyes: Conjunctivae and lids are normal. Pupils are equal, round, and reactive to light.   Pulmonary/Chest: Effort normal. No respiratory distress.   Abdominal: Normal appearance.   Musculoskeletal:        Left hip: He exhibits decreased range of motion, decreased strength, tenderness, bony tenderness and crepitus. He exhibits no swelling.        Left ankle: Normal.        Left foot: There is tenderness and swelling. There is no crepitus and no deformity.   Neurological: He is alert and oriented to person, place, and time. No cranial nerve deficit.   Psychiatric: He has a normal mood and affect. His behavior is normal.   Nursing note and vitals reviewed.      Assessment/Plan   Zachary was seen today for foot pain and hip pain.    Diagnoses and all orders for this visit:    Peripheral polyneuropathy  -     Urine Drug Screen Confirmation - Urine, Urine, Clean Catch; Future  -     POC Urine Drug Screen, Triage    Cervical radiculopathy    Pain and swelling of left lower leg    Avascular necrosis    Complex regional pain syndrome i of left lower limb    Other orders  -     Gabapentin Enacarbil ER (HORIZANT) 600 MG tablet controlled-release; Take 600 mg by mouth Every 12 (Twelve) Hours.        --- Follow-up 1 month  -- he states a strong desire to avoid " opioids  -- He notes only modest relief with Neurontin, so trialing of a more potent formulation is reasonable to attempt to maximize success.      -- if successful we will need to establish a prescribing agreement for continuation of this therapy         RAMONA REPORT    As part of the patient's treatment plan, I am prescribing controlled substances. The patient has been made aware of appropriate use of such medications, including potential risk of somnolence, limited ability to drive and/or work safely, and the potential for dependence or overdose. It has also bee made clear that these medications are for use by this patient only, without concomitant use of alcohol or other substances unless prescribed.       RAMONA report has been reviewed and scanned into the patient's chart.    Date of last RAMONA : as above    History and physical exam exhibit continued safe and appropriate use of controlled substances.         EMR Dragon/Transcription disclaimer:   Much of this encounter note is an electronic transcription/translation of spoken language to printed text. The electronic translation of spoken language may permit erroneous, or at times, nonsensical words or phrases to be inadvertently transcribed; Although I have reviewed the note for such errors, some may still exist.

## 2018-05-03 ENCOUNTER — PRIOR AUTHORIZATION (OUTPATIENT)
Dept: PAIN MEDICINE | Facility: CLINIC | Age: 53
End: 2018-05-03

## 2018-05-03 NOTE — TELEPHONE ENCOUNTER
Sent PA for Horizant to [employer] through [employer] (Key # [id number]) and received the following message:    Zachary Gerard (Rubi: [id number])     [employer] [facility] is reviewing your PA request and will respond within 24-72 hours. To check for an update later, open this request from your dashboard.  You may close this dialog and return to your dashboard to perform other tasks.

## 2018-05-04 ENCOUNTER — OFFICE VISIT (OUTPATIENT)
Dept: ORTHOPEDIC SURGERY | Facility: CLINIC | Age: 53
End: 2018-05-04

## 2018-05-04 VITALS — HEIGHT: 75 IN | WEIGHT: 226 LBS | TEMPERATURE: 98.1 F | BODY MASS INDEX: 28.1 KG/M2

## 2018-05-04 DIAGNOSIS — G90.522 COMPLEX REGIONAL PAIN SYNDROME I OF LEFT LOWER LIMB: Primary | ICD-10-CM

## 2018-05-04 DIAGNOSIS — M25.475 SWELLING OF FOOT JOINT, LEFT: ICD-10-CM

## 2018-05-04 PROCEDURE — 99214 OFFICE O/P EST MOD 30 MIN: CPT | Performed by: ORTHOPAEDIC SURGERY

## 2018-05-04 NOTE — TELEPHONE ENCOUNTER
Received the following approval message:    Zachary Gerard (Key: [id number]) - [id number]  Horizant 600MG er tablets  Status: PA Response - Approved  Created: May 3rd, 2018 [phone]  Sent: May 3rd, 2018

## 2018-05-04 NOTE — PROGRESS NOTES
Patient:  Zachary Gerard is a 53 y.o. male    Chief Complaint/ Reason for Visit:    Chief Complaint   Patient presents with   • Left Foot - Follow-up, Pain, Foot Swelling       HPI:  The patient returns today for follow-up on his left foot pain and swelling.  He has pain of a moderate to severe degree on a constant basis.  He says it does feel better when he uses weight off of it, but says that he cannot do that all the time.  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  He is very active.  He brought some photographs of his left foot at various times over the past few weeks.  At times he has severe swelling all the way into his toes and it looks as though they might burst.  The foot is many different colors in these various images.  He says that when he wakes up in the morning, the swelling is better, but then after he standing for 15-20 minutes, his foot is usually severely swollen.  He does think the compression stocking helps.  He says that so far, no one has been able to come up with a diagnosis yet.  Apparently Dr. Cotter has run a battery of additional tests, but nothing has been diagnostic yet.  He has seen a hematologist to see if he had some sort of a blood cell disorder that may be causing widespread vascular problems and avascular necrosis.  Apparently this workup did not reveal anything diagnostic either.    He has seen a pain doctor at [place], but does not want to be on any opioids.  He is continuing his gabapentin and continuing his boot and continuing to protect his foot as much as he can.    Records review: I did review his recent hematology visit as well as his pain management visit, which had an incomplete note.  I reviewed the recent laboratory studies which Dr. Cotter had ordered including thyroid studies, calcium metabolism studies, and the extensive hematologic workup.      PMH:    Past Medical History:   Diagnosis Date   • Adverse effect of anesthesia     PATIENT STATES WAKES UP ANGRY  & COMBATIVE   • Arthritis    • CTS (carpal tunnel syndrome)    • Diverticulosis    • Gout    • H/O Right leg pain 2011   • Hiatal hernia    • Hypertension    • IBS (irritable bowel syndrome)    • Osteonecrosis    • Osteoporosis        PSH:    Past Surgical History:   Procedure Laterality Date   • EXTENSOR TENDON OF FOREARM / WRIST REPAIR Bilateral    • FOOT FRACTURE SURGERY     • FOOT NEUROMA SURGERY Right 2003    Mccabe's neuroma   • HERNIA REPAIR      BILATERAL INGUINAL   • OTHER SURGICAL HISTORY Right 2007    Elbow surgery   • UMBILICAL HERNIA REPAIR N/A 1/16/2017    Procedure: UMBILICAL HERNIA REPAIR;  Surgeon: Fernando Montes Jr., MD;  Location: [facility] OR;  Service:        Social Hx:    Social History     Social History   • Marital status:      Spouse name: Padma   • Number of children: N/A   • Years of education: College     Occupational History   • Business owner      [employer]     Social History Main Topics   • Smoking status: Former Smoker     Packs/day: 1.50     Years: 20.00     Types: Cigarettes     Quit date: 9/7/2016   • Smokeless tobacco: Never Used   • Alcohol use Yes      Comment: 1 GLASS WINE PER NIGHT   • Drug use: No   • Sexual activity: Defer     Other Topics Concern   • Not on file     Social History Narrative   • No narrative on file       Family Hx:    Family History   Problem Relation Age of Onset   • No Known Problems Mother    • Gout Father    • Psoriasis Sister    • Lymphoma Sister    • Hypertension Brother        Meds:    Current Outpatient Prescriptions:   •  allopurinol (ZYLOPRIM) 300 MG tablet, Take 1 tablet by mouth Daily., Disp: 90 tablet, Rfl: 1  •  amitriptyline (ELAVIL) 100 MG tablet, Take 1 tablet by mouth At Night As Needed for Sleep., Disp: 90 tablet, Rfl: 0  •  amitriptyline (ELAVIL) 25 MG tablet, TAKE TWO TABLETS BY MOUTH EVERY NIGHT AS NEEDED FOR SLEEP. TAKE ONE TABLET BY MOUTH IN ADDITION TO  MG TABLET IF NEEDED, Disp: 90 tablet, Rfl: 0  •  enalapril (VASOTEC)  "2.5 MG tablet, Take 1 tablet by mouth Daily., Disp: 90 tablet, Rfl: 1  •  furosemide (LASIX) 20 MG tablet, Take 1 tablet by mouth Daily., Disp: 30 tablet, Rfl: 1  •  Gabapentin Enacarbil ER (HORIZANT) 600 MG tablet controlled-release, Take 600 mg by mouth Every 12 (Twelve) Hours., Disp: 60 tablet, Rfl: 2    Allergies:  No Known Allergies    ROS:  Review of Systems   Constitutional: Positive for activity change. Negative for fatigue and fever.   Respiratory: Negative for chest tightness and shortness of breath.    Cardiovascular: Positive for leg swelling. Negative for chest pain.   Musculoskeletal: Positive for arthralgias, gait problem, joint swelling and myalgias.   All other systems reviewed and are negative.      Vitals:    05/04/18 1059   Temp: 98.1 °F (36.7 °C)   TempSrc: Temporal Artery    Weight: 103 kg (226 lb)   Height: 190.5 cm (75\")     Body mass index is 28.25 kg/m².    Physical Exam    The patient is awake, alert, and oriented ×3.  The patient is in no acute distress.  Breathing is regular and unlabored with a respiratory rate of 14/m.  Extraocular movements and pupillary responses are symmetrically intact. Sclerae are anicteric.   Hearing is within normal limits.  Speech is within normal limits.  There is no jugular venous distention.    Left lower extremity, there is moderate swelling of the left foot and ankle, but it appears improved compared to previous exam.  As noted above, I reviewed multiple photographs the patient has taken in the past several days of his feet, and there are times that he has severe discoloration and swelling of the left foot and ankle area.  His left calf presently is soft and nontender with no venous cord.  There is no cellulitis or lymphangitis.  I do not feel any popliteal lymphadenopathy.  His dorsalis pedis pulse is intact and regular with a current heart rate of about 78 bpm.  The patient does have some mild hyperesthesia to light touch of his left foot, and the foot " "seems a bit cool with an unusual mottled discoloration.            Assessment:     Diagnosis Plan   1. Complex regional pain syndrome i of left lower limb  Nerve Block By Anesthesiologist    Ambulatory Referral to Hospital Pain Management Department    Ambulatory Referral to Vascular Surgery   2. Swelling of foot joint, left  Nerve Block By Anesthesiologist    Ambulatory Referral to Hospital Pain Management Department    Ambulatory Referral to Vascular Surgery           Plan:  I discussed everything with the patient at length.  I think that we need to consider that he may well have reflex sympathetic dystrophy or \"complex regional pain syndrome\" as it is more recently known.      I explained that sympathetic nerve blocks may be helpful to reduce pain and swelling and possibly the duration of the problem, if that is in fact the disorder from which he currently suffers.    As the extensive workup that he has undergone progresses with no answers, it makes the diagnosis of RSD seem more likely.    However, I also think it would be worthwhile for him to see a vascular specialist.    The patient reminded me that several years ago he experienced a similar set of symptoms in his right lower extremity that went undiagnosed and ultimately resolved on its own.  I explained that RSD was typically a self-limited disorder, but that the sympathetic blocks could reduce the symptoms and the duration.  He was certainly interested in pursuing this.    Appropriate referrals were created below.    Orders Placed This Encounter   Procedures   • Nerve Block By Anesthesiologist   • Ambulatory Referral to Hospital Pain Management Department     Referral Priority:   Routine     Referral Type:   Consultation     Referral Reason:   Specialty Services Required     Requested Specialty:   Pain Medicine     Number of Visits Requested:   1   • Ambulatory Referral to Vascular Surgery     Referral Priority:   Routine     Referral Type:   Consultation "     Referral Reason:   Specialty Services Required     Referred to Provider:   Jose Antonio Hoang MD     Requested Specialty:   Vascular Surgery     Number of Visits Requested:   1     I spent 28 minutes at least in the evaluation and management of this patient today, 15 minutes of which were spent in face-to-face consultation examination, question and answer, counseling and discussion.

## 2018-05-07 ENCOUNTER — TELEPHONE (OUTPATIENT)
Dept: ORTHOPEDIC SURGERY | Facility: CLINIC | Age: 53
End: 2018-05-07

## 2018-05-07 NOTE — TELEPHONE ENCOUNTER
CALLED [facility] IN XRAY TO MAKE CD OF PTS LEFT FOOT XRAY ON 4/6/18 FOR HIM TO  FOR HIS VASCULAR APPT/DM

## 2018-05-26 DIAGNOSIS — F51.04 CHRONIC INSOMNIA: ICD-10-CM

## 2018-05-29 RX ORDER — AMITRIPTYLINE HYDROCHLORIDE 25 MG/1
TABLET, FILM COATED ORAL
Qty: 90 TABLET | Refills: 0 | Status: SHIPPED | OUTPATIENT
Start: 2018-05-29 | End: 2018-06-01

## 2018-06-01 ENCOUNTER — OFFICE VISIT (OUTPATIENT)
Dept: INTERNAL MEDICINE | Age: 53
End: 2018-06-01

## 2018-06-01 VITALS
DIASTOLIC BLOOD PRESSURE: 88 MMHG | HEIGHT: 75 IN | BODY MASS INDEX: 28.72 KG/M2 | OXYGEN SATURATION: 95 % | TEMPERATURE: 97.8 F | WEIGHT: 231 LBS | HEART RATE: 96 BPM | SYSTOLIC BLOOD PRESSURE: 132 MMHG

## 2018-06-01 DIAGNOSIS — M79.672 FOOT PAIN, LEFT: Primary | ICD-10-CM

## 2018-06-01 DIAGNOSIS — R60.0 LOCALIZED EDEMA: ICD-10-CM

## 2018-06-01 DIAGNOSIS — G90.522 COMPLEX REGIONAL PAIN SYNDROME TYPE 1 OF LEFT LOWER EXTREMITY: Primary | ICD-10-CM

## 2018-06-01 DIAGNOSIS — F51.04 CHRONIC INSOMNIA: ICD-10-CM

## 2018-06-01 LAB
BUN SERPL-MCNC: 14 MG/DL (ref 6–20)
BUN/CREAT SERPL: 16.1 (ref 7–25)
CALCIUM SERPL-MCNC: 9 MG/DL (ref 8.6–10.5)
CHLORIDE SERPL-SCNC: 96 MMOL/L (ref 98–107)
CO2 SERPL-SCNC: 27.1 MMOL/L (ref 22–29)
CREAT SERPL-MCNC: 0.87 MG/DL (ref 0.76–1.27)
GFR SERPLBLD CREATININE-BSD FMLA CKD-EPI: 110 ML/MIN/1.73
GFR SERPLBLD CREATININE-BSD FMLA CKD-EPI: 91 ML/MIN/1.73
GLUCOSE SERPL-MCNC: 99 MG/DL (ref 65–99)
POTASSIUM SERPL-SCNC: 4.5 MMOL/L (ref 3.5–5.2)
SODIUM SERPL-SCNC: 137 MMOL/L (ref 136–145)

## 2018-06-01 PROCEDURE — 99213 OFFICE O/P EST LOW 20 MIN: CPT | Performed by: INTERNAL MEDICINE

## 2018-06-01 RX ORDER — AMITRIPTYLINE HYDROCHLORIDE 100 MG/1
100 TABLET, FILM COATED ORAL NIGHTLY PRN
Qty: 90 TABLET | Refills: 0 | Status: SHIPPED | OUTPATIENT
Start: 2018-06-01 | End: 2018-08-30 | Stop reason: SDUPTHER

## 2018-06-01 RX ORDER — AMITRIPTYLINE HYDROCHLORIDE 25 MG/1
50 TABLET, FILM COATED ORAL NIGHTLY
Qty: 180 TABLET | Refills: 0 | Status: SHIPPED | OUTPATIENT
Start: 2018-06-01 | End: 2018-08-30 | Stop reason: SDUPTHER

## 2018-06-01 NOTE — PROGRESS NOTES
"[facility] [place] [facility]        Zachary LUCIO Washle / 53 y.o. / male  06/01/2018      ASSESSMENT & PLAN:    Problem List Items Addressed This Visit        Unprioritized    Chronic insomnia    Relevant Medications    amitriptyline (ELAVIL) 25 MG tablet    Foot pain, left - Primary      Other Visit Diagnoses     Localized edema        Relevant Orders    Basic Metabolic Panel        Orders Placed This Encounter   Procedures   • Basic Metabolic Panel       Summary/Discussion:Number-one chronic pain, I will contact pain management to see if we can arrange the sympathetic nerve blocks as suggested by orthopedics.    #2 edema patient will discontinue the Lasix as he does not believe that is helpful.  He will restart the swelling significantly worsens after discontinuation.  We'll check a BMP today to be sure the potassium level is adequate after approximately one month on diuretic therapy.    Addendum, I did discuss the case with Dr. Flores who agreed to see the patient next week for sympathetic nerve block.  Patient was notified by my nurse and pain management will contact him as well.      Return if symptoms worsen or fail to improve.    ____________________________________________________________________    VITALS    Visit Vitals  /88   Pulse 96   Temp 97.8 °F (36.6 °C)   Ht 190.5 cm (75\")   Wt 105 kg (231 lb)   SpO2 95%   BMI 28.87 kg/m²       BP Readings from Last 3 Encounters:   06/01/18 132/88   05/02/18 128/91   04/26/18 130/92     Wt Readings from Last 3 Encounters:   06/01/18 105 kg (231 lb)   05/04/18 103 kg (226 lb)   05/02/18 100 kg (221 lb)      Body mass index is 28.87 kg/m².    CC:  Main reason(s) for today's visit: Edema and Foot Pain      HPI: Patient returns today for follow-up of chronic left foot pain.  He has been seen by Dr. Gonzalez on May 4 who thinks patient may have RSD and suggested sympathetic nerve block.  He wanted to refer him on to anesthesia for this procedure.  Patient is already been seen " by pain management, Dr. Flores on May 13 was also an anesthesiologist.  Patient requests that rather than seeing another physician to restart a different line of investigation, that Dr. Flores be allowed to do this.  He would like me to contact Dr. Flores and see if this can be arranged.  He continues to be plagued with pain in his foot, and his hip actually is feeling much better at this time.  Pain is burning in quality and worse when he is up on it.  He is oozing a scooter, and a motorized device along with crutches to avoid weightbearing as he has been instructed by orthopedics.  He would like to try these injections next week if possible.    The edema continues to be troublesome, he is using the Lasix as suggested by me, was also seen by vascular surgery who has him in a compression hose.  Unfortunately neither of these interventions are providing much relief of the swelling.            Patient Care Team:  Olvin Cotter MD as PCP - General  Olvin Cotter MD as Referring Physician (Internal Medicine)  ____________________________________________________________________    REVIEW OF SYSTEMS    Review of Systems   Musculoskeletal: Positive for joint swelling.         PHYSICAL EXAMINATION    Physical Exam   Constitutional: He is oriented to person, place, and time. He appears well-developed and well-nourished.   Musculoskeletal: He exhibits edema.   Unilateral left side lower extremity   Neurological: He is alert and oriented to person, place, and time.   Skin: Skin is warm and dry.   Psychiatric: He has a normal mood and affect. His behavior is normal. Judgment and thought content normal.   Nursing note and vitals reviewed.      REVIEWED DATA:    Labs:     Lab Results   Component Value Date     04/26/2018    K 4.6 04/26/2018    AST 12 04/23/2018    ALT 13 04/23/2018    BUN 8 04/26/2018    CREATININE 0.82 04/26/2018    CREATININE 0.89 04/23/2018    CREATININE 0.81 09/08/2017    EGFRIFNONA 98 04/26/2018     EGFRIFAFRI 118 04/26/2018       Lab Results   Component Value Date    GLUCOSE 89 09/08/2017    GLUCOSE 107 (H) 01/16/2017       Lab Results   Component Value Date    LDL 65 01/16/2017    HDL 41 01/16/2017    TRIG 197 (H) 01/16/2017       Lab Results   Component Value Date    TSH 3.500 11/01/2017       Lab Results   Component Value Date    WBC 10.04 (H) 04/11/2018    HGB 15.4 04/11/2018    HGB 14.9 09/08/2017    HGB 15.8 09/06/2017     04/11/2018       Imaging:         Medical Tests:         Summary of old records / correspondence / consultant report:         Request outside records:         ALLERGIES  No Known Allergies     MEDICATIONS  Current Outpatient Prescriptions   Medication Sig Dispense Refill   • allopurinol (ZYLOPRIM) 300 MG tablet Take 1 tablet by mouth Daily. 90 tablet 1   • amitriptyline (ELAVIL) 100 MG tablet Take 1 tablet by mouth At Night As Needed for Sleep. 90 tablet 0   • amitriptyline (ELAVIL) 25 MG tablet Take 2 tablets by mouth Every Night. 180 tablet 0   • enalapril (VASOTEC) 2.5 MG tablet Take 1 tablet by mouth Daily. 90 tablet 1   • furosemide (LASIX) 20 MG tablet Take 1 tablet by mouth Daily. 30 tablet 1   • Gabapentin Enacarbil ER (HORIZANT) 600 MG tablet controlled-release Take 600 mg by mouth Every 12 (Twelve) Hours. 60 tablet 2     No current facility-administered medications for this visit.        PFSH:     The following portions of the patient's history were reviewed and updated as appropriate: Allergies / Current Medications / Past Medical History / Surgical History / Social History / Family History    PROBLEM LIST   Patient Active Problem List   Diagnosis   • Essential hypertension   • Irritable bowel syndrome   • Gout   • Reduced libido   • Carpal tunnel syndrome   • Abdominal hernia   • Osteoporosis   • Other specific arthropathies, not elsewhere classified, unspecified site   • Cervical radiculopathy   • Chronic insomnia   • Carotid artery calcification   • Abnormal  finding on imaging   • Peripheral polyneuropathy   • Osteonecrosis of left hip   • Pain and swelling of left lower leg   • Venous insufficiency of both lower extremities   • Foot erythema   • Swelling of foot joint, left   • Pain and swelling of toe of left foot   • Foot pain, left   • Avascular necrosis   • Osteopenia of spine   • Testosterone deficiency   • Complex regional pain syndrome i of left lower limb       PAST MEDICAL HISTORY  Past Medical History:   Diagnosis Date   • Adverse effect of anesthesia     PATIENT STATES WAKES UP ANGRY & COMBATIVE   • Arthritis    • CTS (carpal tunnel syndrome)    • Diverticulosis    • Gout    • H/O Right leg pain 2011   • Hiatal hernia    • Hypertension    • IBS (irritable bowel syndrome)    • Osteonecrosis    • Osteoporosis        SURGICAL HISTORY  Past Surgical History:   Procedure Laterality Date   • EXTENSOR TENDON OF FOREARM / WRIST REPAIR Bilateral    • FOOT FRACTURE SURGERY     • FOOT NEUROMA SURGERY Right 2003    Mccabe's neuroma   • HERNIA REPAIR      BILATERAL INGUINAL   • OTHER SURGICAL HISTORY Right 2007    Elbow surgery   • UMBILICAL HERNIA REPAIR N/A 1/16/2017    Procedure: UMBILICAL HERNIA REPAIR;  Surgeon: Fernando Montes Jr., MD;  Location: [facility] OR;  Service:        SOCIAL HISTORY  Social History     Social History   • Marital status:      Spouse name: Padma   • Years of education: College     Occupational History   • Business owner      [employer]     Social History Main Topics   • Smoking status: Former Smoker     Packs/day: 1.50     Years: 20.00     Types: Cigarettes     Quit date: 9/7/2016   • Smokeless tobacco: Never Used   • Alcohol use Yes      Comment: 1 GLASS WINE PER NIGHT   • Drug use: No   • Sexual activity: Defer     Other Topics Concern   • Not on file       FAMILY HISTORY  Family History   Problem Relation Age of Onset   • No Known Problems Mother    • Gout Father    • Psoriasis Sister    • Lymphoma Sister    • Hypertension Brother           **Dragon Disclaimer:   Much of this encounter note is an electronic transcription/translation of spoken language to printed text. The electronic translation of spoken language may permit erroneous, or at times, nonsensical words or phrases to be inadvertently transcribed. Although I have reviewed the note for such errors, some may still exist.

## 2018-06-04 ENCOUNTER — TELEPHONE (OUTPATIENT)
Dept: PAIN MEDICINE | Facility: CLINIC | Age: 53
End: 2018-06-04

## 2018-06-05 ENCOUNTER — TELEPHONE (OUTPATIENT)
Dept: INTERNAL MEDICINE | Age: 53
End: 2018-06-05

## 2018-06-05 NOTE — TELEPHONE ENCOUNTER
"Pt called stating he still hasn't been seen in pain mgmt. Pt said he just spoke to a \"Jane\" and she said they are still waiting on his insurance and pt told her he would pay out of pocket and she said, okay and that was that. No appt scheduled yet.   Pt said he was to call you back to let you know what was going on.  Pt's # [phone]  Thanks SP  "

## 2018-06-06 NOTE — TELEPHONE ENCOUNTER
Leila is our surgical .    I spoke with Dr. Cotter on 6-1-18 and we immediately processed a surgical authorization request.    Juan F typically takes anywhere from 7 to 14 days to approve procedures.      He is on schedule for 6-7-18.

## 2018-06-07 ENCOUNTER — OUTSIDE FACILITY SERVICE (OUTPATIENT)
Dept: PAIN MEDICINE | Facility: CLINIC | Age: 53
End: 2018-06-07

## 2018-06-07 ENCOUNTER — DOCUMENTATION (OUTPATIENT)
Dept: PAIN MEDICINE | Facility: CLINIC | Age: 53
End: 2018-06-07

## 2018-06-07 PROCEDURE — 64520 N BLOCK LUMBAR/THORACIC: CPT | Performed by: ANESTHESIOLOGY

## 2018-06-07 NOTE — TELEPHONE ENCOUNTER
Tell patient according to Dr. Flores he is on the schedule for today.  Please let me know if this does not happen.

## 2018-06-08 NOTE — PROGRESS NOTES
Lumbar Sympathetic Blockade - left  [facility]    PREOPERATIVE DIAGNOSIS:    CRPS Type I of the  Left Lower Extremity    POSTOPERATIVE DIAGNOSIS: Same as preoperative dx    PROCEDURE:  Lumbar Sympathetic Block, left, with fluoroscopy                             Needle entry at: L2,  • CPT 24959, Lumbar Sympathetic Blockade    PRE-PROCEDURE DISCUSSION WITH PATIENT:    Risks and complications were discussed with the patient prior to starting the procedure and informed consent was obtained.   We discussed various topics including but not limited to bleeding, infection, injury, nerve injury, paralysis, coma, death, postprocedural painful flare-up, temporary entire leg weakness, numbness, temperature change, postprocedural site soreness, and a lack of pain relief.  We discussed the diagnostic aspect of lumbar sympathetic nerve blockade.    SURGEON:  Deshawn Flores MD    REASON FOR PROCEDURE:    Appears to be meeting [place] criteria for CRPS Type One in LLE.  Very sensitive.  Swelling.  Cannot stand or walk without wearing the air walker boot.      SEDATION:  Patient declined administration of moderate sedation      LOCAL ANESTHETIC: Marcaine 0.5%  STEROID:   Methylprednisolone (DEPO MEDROL) 80mg/ml   TOTAL VOLUME OF SOLUTION:  8 ml    DESCRIPTON OF PROCEDURE:    After obtaining informed consent, I.V. was started in the preop area.   The patient was taken to the operating room and placed in the prone position. EKG, blood pressure, and pulse oximeter were monitored throughout, and sedation was provided as needed by the RN under my guidance. All pressure points were well padded.      Starting in the oblique view toward the appropriate side, the transverse process of the appropriate level was identified. At the injection level, the tip of the transverse process was overlying the anterolateral margin of the vertebral body. The skin and subcutaneous tissue was anesthetized with 1% lidocaine just  cephalad and anterolateral to the verebral body. A 22-gauge spinal needle was introduced cephalad to the transverse process and under fluorocopic guidance with serial images every 1 cm until the needle tip gently contacted the vertebral body. Under lateral view, the needle was gently advanced anteriorly until the tip was over the anteiror one-third of the vertebral body. Needle position was verified in the AP fluoroscopic view with the needle tip lying medial to the lateral margin of the vertebral body. Aspiration was verified to be negative. 2 ml of omnipaque was injected under real time fluoroscopy, in both the lateral and AP demension, to verify lack of vascular uptake and appropriate spread cephalad and caudal. After appropriate spread was confirmed, the local anesthetic solution with steroid was injected with aspiration every couple ml. The needle was then removed intact.  Vital signs remained stable throughout.      ESTIMATED BLOOD LOSS:  <5 mL  SPECIMENS:  None    COMPLICATIONS: No complications were noted., There was no indication of vascular uptake on live injection of contrast dye., There was no indication of intrathecal uptake on live injection of contrast dye. and The patient did not have any signs of postprocedure numbness nor weakness.    TOLERANCE & DISCHARGE CONDITION:    The patient tolerated the procedure well.  The patient was transported to the recovery area without difficulties.  The patient was monitored for at least 30 minutes after the procedure, and temperature increase in the ipsilateral upper extremity was noted.  The patient was discharged to home under the care of family in stable and satisfactory condition.    PLAN OF CARE:  1. The patient was given our standard instruction sheet.  2. The patient will Repeat injection 1 wk.    3. The patient will resume all medications as per the medication reconciliation sheet.

## 2018-06-28 ENCOUNTER — DOCUMENTATION (OUTPATIENT)
Dept: PAIN MEDICINE | Facility: CLINIC | Age: 53
End: 2018-06-28

## 2018-06-28 ENCOUNTER — OUTSIDE FACILITY SERVICE (OUTPATIENT)
Dept: PAIN MEDICINE | Facility: CLINIC | Age: 53
End: 2018-06-28

## 2018-06-28 PROCEDURE — 64520 N BLOCK LUMBAR/THORACIC: CPT | Performed by: ANESTHESIOLOGY

## 2018-06-28 NOTE — PROGRESS NOTES
Lumbar Sympathetic Blockade - left  [facility]    PREOPERATIVE DIAGNOSIS:    CRPS Type I of the  Left Lower Extremity    POSTOPERATIVE DIAGNOSIS: Same as preoperative dx    PROCEDURE:  Lumbar Sympathetic Block, left, with fluoroscopy                             Needle entry at: L2,  • CPT 58384, Lumbar Sympathetic Blockade    PRE-PROCEDURE DISCUSSION WITH PATIENT:    Risks and complications were discussed with the patient prior to starting the procedure and informed consent was obtained.   We discussed various topics including but not limited to bleeding, infection, injury, nerve injury, paralysis, coma, death, postprocedural painful flare-up, temporary entire leg weakness, numbness, temperature change, postprocedural site soreness, and a lack of pain relief.  We discussed the diagnostic aspect of lumbar sympathetic nerve blockade.    SURGEON:  Deshawn Flores MD    REASON FOR PROCEDURE:    After the previous LSB 3 wks ago, he has >50% less sensitivity and he can bear weight on the left foot.      SEDATION:  Patient declined administration of moderate sedation      LOCAL ANESTHETIC: Marcaine 0.5%  STEROID:   Methylprednisolone (DEPO MEDROL) 80mg/ml   TOTAL VOLUME OF SOLUTION:  8 ml    DESCRIPTON OF PROCEDURE:    After obtaining informed consent, I.V. was started in the preop area.   The patient was taken to the operating room and placed in the prone position. EKG, blood pressure, and pulse oximeter were monitored throughout, and sedation was provided as needed by the RN under my guidance. All pressure points were well padded.      Starting in the oblique view toward the appropriate side, the transverse process of the appropriate level was identified. At the injection level, the tip of the transverse process was overlying the anterolateral margin of the vertebral body. The skin and subcutaneous tissue was anesthetized with 1% lidocaine just cephalad and anterolateral to the verebral body. A  22-gauge spinal needle was introduced cephalad to the transverse process and under fluorocopic guidance with serial images every 1 cm until the needle tip gently contacted the vertebral body. Under lateral view, the needle was gently advanced anteriorly until the tip was over the anteiror one-third of the vertebral body. Needle position was verified in the AP fluoroscopic view with the needle tip lying medial to the lateral margin of the vertebral body. Aspiration was verified to be negative. 3 ml of omnipaque was injected under real time fluoroscopy, in both the lateral and AP demension, to verify lack of vascular uptake and appropriate spread cephalad and caudal. After appropriate spread was confirmed, the local anesthetic solution with steroid was injected with aspiration every couple ml. The needle was then removed intact.  Vital signs remained stable throughout.      ESTIMATED BLOOD LOSS:  <5 mL  SPECIMENS:  None    COMPLICATIONS: No complications were noted., There was no indication of vascular uptake on live injection of contrast dye., There was no indication of intrathecal uptake on live injection of contrast dye. and The patient did not have any signs of postprocedure numbness nor weakness.    TOLERANCE & DISCHARGE CONDITION:    The patient tolerated the procedure well.  The patient was transported to the recovery area without difficulties.  The patient was monitored for at least 30 minutes after the procedure, and temperature increase in the ipsilateral upper extremity was noted.  The patient was discharged to home under the care of family in stable and satisfactory condition.    PLAN OF CARE:  1. The patient was given our standard instruction sheet.  2. The patient will Repeat injection 1 wk.    3. The patient will resume all medications as per the medication reconciliation sheet.

## 2018-07-11 ENCOUNTER — TELEPHONE (OUTPATIENT)
Dept: PAIN MEDICINE | Facility: CLINIC | Age: 53
End: 2018-07-11

## 2018-07-12 ENCOUNTER — OFFICE VISIT (OUTPATIENT)
Dept: PAIN MEDICINE | Facility: CLINIC | Age: 53
End: 2018-07-12

## 2018-07-12 VITALS
RESPIRATION RATE: 15 BRPM | SYSTOLIC BLOOD PRESSURE: 135 MMHG | OXYGEN SATURATION: 95 % | HEART RATE: 104 BPM | HEIGHT: 75 IN | TEMPERATURE: 98.2 F | BODY MASS INDEX: 28.72 KG/M2 | WEIGHT: 231 LBS | DIASTOLIC BLOOD PRESSURE: 91 MMHG

## 2018-07-12 DIAGNOSIS — M79.662 PAIN AND SWELLING OF LEFT LOWER LEG: ICD-10-CM

## 2018-07-12 DIAGNOSIS — M79.89 PAIN AND SWELLING OF LEFT LOWER LEG: ICD-10-CM

## 2018-07-12 DIAGNOSIS — G89.29 OTHER CHRONIC PAIN: Primary | ICD-10-CM

## 2018-07-12 DIAGNOSIS — G90.522 COMPLEX REGIONAL PAIN SYNDROME I OF LEFT LOWER LIMB: ICD-10-CM

## 2018-07-12 PROCEDURE — 99214 OFFICE O/P EST MOD 30 MIN: CPT | Performed by: NURSE PRACTITIONER

## 2018-07-12 NOTE — PROGRESS NOTES
CHIEF COMPLAINT  F/U left foot and left hip pain. Pt states he got 55% relief at the best and he went through a period of three weeks after getting both injections that his pain decreased. States when he took his boot off aline MD order, his foot swelled up again and the pain came back to its baseline. He has his boot on again. States he is not having left hip pain any longer. He wants to have another injection since the first two helped his pain.     Subjective   Zachary Gerard is a 53 y.o. male  who presents to the office for follow-up of procedure.  He completed a left lumbar sypathetic block   on  6-28-18 and 6-07-18 performed by Dr. Flores for management of foot pain. Patient reports 55% relief from the procedure for a month, was doing quite well until he had a setback after removing the boot from his left foot causing the pain/swelling to return.      Hip Pain    The incident occurred more than 1 week ago. The pain is present in the left hip. The quality of the pain is described as aching (throbbing). The pain is at a severity of 7/10. The pain is moderate. The pain has been constant since onset. Associated symptoms include a loss of motion. Pertinent negatives include no numbness. The symptoms are aggravated by movement and weight bearing. He has tried acetaminophen, ice, non-weight bearing and NSAIDs (horizant) for the symptoms. The treatment provided mild relief.   Foot Pain   This is a chronic problem. The current episode started more than 1 month ago. The problem occurs constantly. The problem has been gradually improving. Pertinent negatives include no chest pain, headaches, nausea, numbness or weakness. Treatments tried: horizant, lumbar sympathetic block. The treatment provided significant relief.      PEG Assessment   What number best describes your pain on average in the past week?7  What number best describes how, during the past week, pain has interfered with your enjoyment of life?6  What number  "best describes how, during the past week, pain has interfered with your general activity?  8    The following portions of the patient's history were reviewed and updated as appropriate: allergies, current medications, past family history, past medical history, past social history, past surgical history and problem list.    Review of Systems   Constitutional: Positive for activity change (decreased). Negative for appetite change.   HENT: Negative for trouble swallowing.    Eyes: Negative for visual disturbance.   Respiratory: Negative for apnea, chest tightness and shortness of breath.    Cardiovascular: Positive for leg swelling. Negative for chest pain.   Gastrointestinal: Negative for constipation, diarrhea and nausea.   Genitourinary: Negative for difficulty urinating and dysuria.   Musculoskeletal: Positive for gait problem.   Neurological: Negative for dizziness, weakness, light-headedness, numbness and headaches.   Psychiatric/Behavioral: Positive for sleep disturbance. Negative for confusion, hallucinations and suicidal ideas.     Vitals:    07/12/18 1506   BP: 135/91   Pulse: 104   Resp: 15   Temp: 98.2 °F (36.8 °C)   SpO2: 95%   Weight: 105 kg (231 lb)   Height: 190.5 cm (75\")   PainSc:   7   PainLoc: Knee  Comment: knee and foot       Objective   Physical Exam   Constitutional: He is oriented to person, place, and time. He appears well-developed and well-nourished.   HENT:   Head: Atraumatic.   Eyes: Conjunctivae and EOM are normal.   Neck: Normal range of motion. Neck supple.   Cardiovascular: Normal rate.    Pulmonary/Chest: Effort normal. No respiratory distress.   Musculoskeletal:        Left foot: There is decreased range of motion and tenderness.   Wearing walking boot LLE    Neurological: He is alert and oriented to person, place, and time. Gait (using knee scooter) abnormal.   Dysthesia reported of LLE   Skin: Skin is warm and dry.   Psychiatric: He has a normal mood and affect. His behavior is " normal.   Nursing note and vitals reviewed.      Assessment/Plan   Zachary was seen today for foot pain and hip pain.    Diagnoses and all orders for this visit:    Other chronic pain    Complex regional pain syndrome i of left lower limb  -     Case Request    Pain and swelling of left lower leg  -     Case Request    Other orders  -     Discontinue: Gabapentin Enacarbil ER (HORIZANT) 600 MG tablet controlled-release; Take 600 mg by mouth Every 12 (Twelve) Hours.  -     Gabapentin Enacarbil ER (HORIZANT) 600 MG tablet controlled-release; Take 600 mg by mouth Every 12 (Twelve) Hours.      --- Repeat left lumbar sympathetic block  --- refilled Horizant  ----------  Education about SCS therapy:    -  This was an extended office visit in which we entered into discussion about advanced pain relieving techniques, and discussed implantable pain therapies.  We discussed advanced neuromodulation in the form of Spinal Cord Stimulation.  This is a reasonable therapy for patients who have exhausted basic nonnarcotic options, basic modalities and physical therapies, and do not have any other reasonable surgical options.  This therapy as an alternative to long term high dose opioid therapy.    -  Risks include but are not limited to bleeding, infection, injury, paralysis, nerve injury, dural puncture, and risk for postprocedural pain.  Implanted equipment risks include but are not limited to lead migration, lead fracture, risk of loss of pain relieving stimulation, risk of electrical shock, and risk of system failure.    - We discussed the theory and basic science behind SCS therapy including but not limited to energy delivery and relevant anatomy, in terms that are easy to understand and also with use of illustrative devices.  Spinal Cord Stimulation therapies apply an electromagnetic field to a specific area on the spinal cord (Dorsal Column) to attempt to block transmission of painful signals from the peripheral nerves to the  brain.    -  We discussed that prior to trialing, I request that patients review relevant materials and perform some research, and also have a follow up education session with a device specialist from the .  Also, insurance requires a presurgical psychological evaluation.  When these have been completed, and all the patient's questions have been answered to their satisfaction, then we will plan to request authorization for trialing.   - We discussed the trialing process (aka Phase 1)  that usually lasts a week, and the temporary nature of this trial.  Trial success will determine whether or not we proceed to implant.  We discussed reasonable expectations, and that I feel that consistent 50% pain relief is medically successful and is a reasonable expectation to justify moving forward to permanent implant.    -  Additional risks of Phase 1 include but are not limited to bleeding on insertion, bleeding on lead removal, and procedural site soreness.  - We discussed the percutaneous surgical implant, including postsurgical restrictions, risks, and alternatives.   For spinal cord stimulation implanted device (aka SCS Phase 2) there is usually a midline vertical incision for the spinally implanted leads, and also a horizontal incision in the posterior lumbar flank for implantation of the battery & computer (aka IPG).  The leads are tunneled from the midline incision to the medial aspect of the battery pocket incision.    -  Postoperative restrictions include limiting the following activity as much as possible for 90 days:  Lifting >10 lbs, bending at the waist, stretching/reaching overhead, and twisting.  ----------  --- Follow-up after procedure          RAMONA REPORT  As part of the patient's treatment plan, I am prescribing controlled substances. The patient has been made aware of appropriate use of such medications, including potential risk of somnolence, limited ability to drive and/or work safely, and the  potential for dependence or overdose. It has also bee made clear that these medications are for use by this patient only, without concomitant use of alcohol or other substances unless prescribed.     Patient has completed prescribing agreement detailing terms of continued prescribing of controlled substances, including monitoring RAMONA reports, urine drug screening, and pill counts if necessary. The patient is aware that inappropriate use will results in cessation of prescribing such medications.    RAMONA report has been reviewed and scanned into the patient's chart.    As the clinician, I personally reviewed the RAMONA from 7/11/2018 while the patient was in the office today.    History and physical exam exhibit continued safe and appropriate use of controlled substances.     EMR Dragon/Transcription disclaimer:   Much of this encounter note is an electronic transcription/translation of spoken language to printed text. The electronic translation of spoken language may permit erroneous, or at times, nonsensical words or phrases to be inadvertently transcribed; Although I have reviewed the note for such errors, some may still exist.      INITIAL VISIT WITH NGUYỄN SMITH

## 2018-07-17 ENCOUNTER — DOCUMENTATION (OUTPATIENT)
Dept: PAIN MEDICINE | Facility: CLINIC | Age: 53
End: 2018-07-17

## 2018-07-17 ENCOUNTER — OUTSIDE FACILITY SERVICE (OUTPATIENT)
Dept: PAIN MEDICINE | Facility: CLINIC | Age: 53
End: 2018-07-17

## 2018-07-17 PROCEDURE — 64520 N BLOCK LUMBAR/THORACIC: CPT | Performed by: ANESTHESIOLOGY

## 2018-07-17 NOTE — PROGRESS NOTES
Lumbar Sympathetic Blockade - left  [facility]    PREOPERATIVE DIAGNOSIS:    CRPS Type I of the  Left Lower Extremity    POSTOPERATIVE DIAGNOSIS: Same as preoperative dx    PROCEDURE:  Lumbar Sympathetic Block, left, with fluoroscopy                             Needle entry at: L2,  • CPT 65945, Lumbar Sympathetic Blockade    PRE-PROCEDURE DISCUSSION WITH PATIENT:    Risks and complications were discussed with the patient prior to starting the procedure and informed consent was obtained.   We discussed various topics including but not limited to bleeding, infection, injury, nerve injury, paralysis, coma, death, postprocedural painful flare-up, temporary entire leg weakness, numbness, temperature change, postprocedural site soreness, and a lack of pain relief.  We discussed the diagnostic aspect of lumbar sympathetic nerve blockade.    SURGEON:  Deshawn Flores MD    REASON FOR PROCEDURE:    Making clinical progress.... He can now bear weight on the left foot.  He still has constant pain that he rates as moderate which is an improvement.     SEDATION:  Patient declined administration of moderate sedation      LOCAL ANESTHETIC: Marcaine 0.5%  STEROID:   Methylprednisolone (DEPO MEDROL) 80mg/ml   TOTAL VOLUME OF SOLUTION:  8 ml    DESCRIPTON OF PROCEDURE:    After obtaining informed consent, I.V. was started in the preop area.   The patient was taken to the operating room and placed in the prone position. EKG, blood pressure, and pulse oximeter were monitored throughout, and sedation was provided as needed by the RN under my guidance. All pressure points were well padded.      Starting in the oblique view toward the appropriate side, the transverse process of the appropriate level was identified. At the injection level, the tip of the transverse process was overlying the anterolateral margin of the vertebral body. The skin and subcutaneous tissue was anesthetized with 1% lidocaine just cephalad and  anterolateral to the verebral body. A 22-gauge spinal needle was introduced cephalad to the transverse process and under fluorocopic guidance with serial images every 1 cm until the needle tip gently contacted the vertebral body. Under lateral view, the needle was gently advanced anteriorly until the tip was over the anteiror one-third of the vertebral body. Needle position was verified in the AP fluoroscopic view with the needle tip lying medial to the lateral margin of the vertebral body. Aspiration was verified to be negative. 2 ml of omnipaque was injected under real time fluoroscopy, in both the lateral and AP demension, to verify lack of vascular uptake and appropriate spread cephalad and caudal. After appropriate spread was confirmed, the local anesthetic solution with steroid was injected with aspiration every couple ml. The needle was then removed intact.  Vital signs remained stable throughout.      ESTIMATED BLOOD LOSS:  <5 mL  SPECIMENS:  None    COMPLICATIONS: No complications were noted., There was no indication of vascular uptake on live injection of contrast dye. and The patient did not have any signs of postprocedure numbness nor weakness.    TOLERANCE & DISCHARGE CONDITION:    The patient tolerated the procedure well.  The patient was transported to the recovery area without difficulties.  The patient was monitored for at least 30 minutes after the procedure, and temperature increase in the ipsilateral upper extremity was noted.  The patient was discharged to home under the care of family in stable and satisfactory condition.    PLAN OF CARE:  1. The patient was given our standard instruction sheet.  2. The patient will Return to clinic 3-4 wks.    3. The patient will resume all medications as per the medication reconciliation sheet.

## 2018-08-07 ENCOUNTER — OFFICE VISIT (OUTPATIENT)
Dept: PAIN MEDICINE | Facility: CLINIC | Age: 53
End: 2018-08-07

## 2018-08-07 VITALS
TEMPERATURE: 97.5 F | WEIGHT: 234.2 LBS | HEART RATE: 96 BPM | RESPIRATION RATE: 15 BRPM | BODY MASS INDEX: 29.12 KG/M2 | HEIGHT: 75 IN | SYSTOLIC BLOOD PRESSURE: 136 MMHG | DIASTOLIC BLOOD PRESSURE: 87 MMHG | OXYGEN SATURATION: 94 %

## 2018-08-07 DIAGNOSIS — G89.29 OTHER CHRONIC PAIN: Primary | ICD-10-CM

## 2018-08-07 DIAGNOSIS — M79.662 PAIN AND SWELLING OF LEFT LOWER LEG: ICD-10-CM

## 2018-08-07 DIAGNOSIS — M79.89 PAIN AND SWELLING OF LEFT LOWER LEG: ICD-10-CM

## 2018-08-07 DIAGNOSIS — G90.522 COMPLEX REGIONAL PAIN SYNDROME I OF LEFT LOWER LIMB: ICD-10-CM

## 2018-08-07 PROCEDURE — 99212 OFFICE O/P EST SF 10 MIN: CPT | Performed by: NURSE PRACTITIONER

## 2018-08-07 NOTE — PROGRESS NOTES
CHIEF COMPLAINT  F/U injection. Pt states his left foot is feeling better than it has in a long time, and injection has made a big difference. Cast is off foot, still wearing knee brace.     Subjective   Zachary Gerard is a 53 y.o. male  who presents to the office for follow-up of procedure.  He completed a Left Lumbar sympathetic block   on  7-17-18, 6-28-18, and 6-07-18 performed by Dr. Flores for management of left foot pain. Patient reports 60% relief from the procedure.      Hip Pain    The incident occurred more than 1 week ago. The pain is present in the left hip. The quality of the pain is described as aching (throbbing). The pain is moderate. The pain has been constant since onset. Associated symptoms include a loss of motion. Pertinent negatives include no numbness. The symptoms are aggravated by movement and weight bearing. He has tried acetaminophen, ice, non-weight bearing and NSAIDs (horizant) for the symptoms. The treatment provided mild relief.   Foot Pain   This is a chronic problem. The current episode started more than 1 month ago. The problem occurs constantly. The problem has been gradually improving. Associated symptoms include congestion and coughing. Pertinent negatives include no chest pain, headaches, nausea, numbness or weakness. Treatments tried: horizant, lumbar sympathetic block. The treatment provided significant relief.      PEG Assessment   What number best describes your pain on average in the past week?7  What number best describes how, during the past week, pain has interfered with your enjoyment of life?7  What number best describes how, during the past week, pain has interfered with your general activity?  6    The following portions of the patient's history were reviewed and updated as appropriate: allergies, current medications, past family history, past medical history, past social history, past surgical history and problem list.    Review of Systems   Constitutional:  "Positive for activity change (decreased). Negative for appetite change.   HENT: Positive for congestion. Negative for trouble swallowing.    Eyes: Negative for visual disturbance.   Respiratory: Positive for cough. Negative for apnea, chest tightness and shortness of breath.    Cardiovascular: Positive for leg swelling. Negative for chest pain.   Gastrointestinal: Negative for constipation, diarrhea and nausea.   Genitourinary: Negative for difficulty urinating and dysuria.   Musculoskeletal: Positive for gait problem.   Neurological: Negative for dizziness, weakness, light-headedness, numbness and headaches.   Psychiatric/Behavioral: Negative for agitation, confusion, hallucinations, sleep disturbance and suicidal ideas.     Vitals:    08/07/18 0935   BP: 136/87   Pulse: 96   Resp: 15   Temp: 97.5 °F (36.4 °C)   SpO2: 94%   Weight: 106 kg (234 lb 3.2 oz)   Height: 190.5 cm (75\")   PainSc:   6   PainLoc: Foot     Objective   Physical Exam   Constitutional: He is oriented to person, place, and time. He appears well-developed and well-nourished.   HENT:   Head: Atraumatic.   Eyes: Conjunctivae and EOM are normal.   Neck: Normal range of motion. Neck supple.   Cardiovascular: Normal rate.    Pulmonary/Chest: Effort normal. No respiratory distress.   Musculoskeletal:   Knee brace left    Neurological: He is alert and oriented to person, place, and time. Gait (limping) abnormal.   Skin: Skin is warm and dry.   Psychiatric: He has a normal mood and affect. His behavior is normal.   Nursing note and vitals reviewed.      Assessment/Plan   Zachary was seen today for hip pain, foot pain and knee pain.    Diagnoses and all orders for this visit:    Other chronic pain    Complex regional pain syndrome i of left lower limb    Pain and swelling of left lower leg      --- Repeat left lumbar sympathetic block PRN  --- Previous discussion regarding SCS if not stable with intermittent blocks   --- Follow-up 2 months/HARJIT GREENE" REPORT    RAMONA report has been reviewed and scanned into the patient's chart.    As the clinician, I personally reviewed the RAMONA from 8/6/2018 while the patient was in the office today.    EMR Dragon/Transcription disclaimer:   Much of this encounter note is an electronic transcription/translation of spoken language to printed text. The electronic translation of spoken language may permit erroneous, or at times, nonsensical words or phrases to be inadvertently transcribed; Although I have reviewed the note for such errors, some may still exist.

## 2018-08-30 ENCOUNTER — OFFICE VISIT (OUTPATIENT)
Dept: INTERNAL MEDICINE | Age: 53
End: 2018-08-30

## 2018-08-30 VITALS
HEART RATE: 102 BPM | HEIGHT: 75 IN | DIASTOLIC BLOOD PRESSURE: 66 MMHG | TEMPERATURE: 97.6 F | OXYGEN SATURATION: 96 % | SYSTOLIC BLOOD PRESSURE: 120 MMHG | WEIGHT: 231.2 LBS | BODY MASS INDEX: 28.75 KG/M2

## 2018-08-30 DIAGNOSIS — I10 ESSENTIAL HYPERTENSION: ICD-10-CM

## 2018-08-30 DIAGNOSIS — F51.04 CHRONIC INSOMNIA: ICD-10-CM

## 2018-08-30 DIAGNOSIS — J40 BRONCHITIS: Primary | ICD-10-CM

## 2018-08-30 PROCEDURE — 99213 OFFICE O/P EST LOW 20 MIN: CPT | Performed by: INTERNAL MEDICINE

## 2018-08-30 RX ORDER — AMITRIPTYLINE HYDROCHLORIDE 25 MG/1
50 TABLET, FILM COATED ORAL NIGHTLY
Qty: 180 TABLET | Refills: 0 | Status: SHIPPED | OUTPATIENT
Start: 2018-08-30 | End: 2018-10-23 | Stop reason: SDUPTHER

## 2018-08-30 RX ORDER — ALLOPURINOL 300 MG/1
300 TABLET ORAL DAILY
Qty: 90 TABLET | Refills: 1 | Status: SHIPPED | OUTPATIENT
Start: 2018-08-30 | End: 2018-10-23 | Stop reason: SDUPTHER

## 2018-08-30 RX ORDER — AZITHROMYCIN 250 MG/1
TABLET, FILM COATED ORAL
Qty: 6 TABLET | Refills: 0 | Status: SHIPPED | OUTPATIENT
Start: 2018-08-30 | End: 2018-10-23

## 2018-08-30 RX ORDER — ENALAPRIL MALEATE 2.5 MG/1
2.5 TABLET ORAL DAILY
Qty: 90 TABLET | Refills: 1 | Status: SHIPPED | OUTPATIENT
Start: 2018-08-30 | End: 2018-10-23

## 2018-08-30 RX ORDER — AMITRIPTYLINE HYDROCHLORIDE 100 MG/1
100 TABLET, FILM COATED ORAL NIGHTLY PRN
Qty: 90 TABLET | Refills: 0 | Status: SHIPPED | OUTPATIENT
Start: 2018-08-30 | End: 2018-10-23 | Stop reason: SDUPTHER

## 2018-08-30 NOTE — PROGRESS NOTES
"[facility] [place] INTERNAL MEDICINE  MD Zachary BOLES Washle / 53 y.o. / male  08/30/2018      ASSESSMENT & PLAN:    Problem List Items Addressed This Visit     None      Visit Diagnoses     Bronchitis    -  Primary    Relevant Medications    azithromycin (ZITHROMAX Z-MEHNAZ) 250 MG tablet    Other Relevant Orders    XR Chest 2 View        Orders Placed This Encounter   Procedures   • XR Chest 2 View       Summary/Discussion:    Number-one bronchitis, we will treat with Z-Mehnaz times one sig as directed, patient advised to get Allegra use one tablet daily for 10 days to dry up his postnasal drainage.  He is aware that this is a nonsedating medication.  I provided him with a sample for Dulera 200/5 µg.  He should use this 2 sprays twice daily.  This should help with relaxing bronchial tissue as well as dilatation and allowing improve expectoration.  He can also use the Mucinex 2 pills twice a day as needed with this as well.  I will also check a chest x-ray at this time.  He will let me know within 2-14 days if this is not helped his symptoms.      No Follow-up on file.    ____________________________________________________________________    VITALS    Visit Vitals  /66   Pulse 102   Temp 97.6 °F (36.4 °C)   Ht 190.5 cm (75\")   Wt 105 kg (231 lb 3.2 oz)   SpO2 96%   BMI 28.90 kg/m²       BP Readings from Last 3 Encounters:   08/30/18 120/66   08/07/18 136/87   07/12/18 135/91     Wt Readings from Last 3 Encounters:   08/30/18 105 kg (231 lb 3.2 oz)   08/07/18 106 kg (234 lb 3.2 oz)   07/12/18 105 kg (231 lb)      Body mass index is 28.9 kg/m².    CC:  Main reason(s) for today's visit: Cough and Foot Swelling      HPI:     Who presents today with a cough and chest congestion over the past 4 weeks or so.  This was purulent at one time, it is improving.  He denied chest pain or shortness of breath but does have wheezing.  There has been no fever chills or sweats problem noted.    Patient Care Team:  Olvin Cotter MD " as PCP - General  Olvin Cotter MD as Referring Physician (Internal Medicine)  ____________________________________________________________________    REVIEW OF SYSTEMS    Review of Systems   Constitutional: Negative for appetite change.   HENT: Positive for postnasal drip. Negative for ear discharge and rhinorrhea.    Cardiovascular: Negative for chest pain.   Gastrointestinal: Negative for constipation, nausea and vomiting.        Patient denied heartburn.         PHYSICAL EXAMINATION    Physical Exam   Constitutional: He is oriented to person, place, and time. He appears well-developed and well-nourished. No distress.   HENT:   Right Ear: Tympanic membrane and ear canal normal.   Left Ear: Tympanic membrane and ear canal normal.   Nose: Right sinus exhibits no maxillary sinus tenderness and no frontal sinus tenderness. Left sinus exhibits no maxillary sinus tenderness and no frontal sinus tenderness.   Mouth/Throat: Uvula is midline, oropharynx is clear and moist and mucous membranes are normal.   Neck: Normal range of motion. Neck supple.   Pulmonary/Chest: Effort normal and breath sounds normal. No respiratory distress. He has no wheezes.   No egophony noted   Lymphadenopathy:     He has no cervical adenopathy.   Neurological: He is alert and oriented to person, place, and time.   Skin: Skin is warm and dry. He is not diaphoretic.   Psychiatric: He has a normal mood and affect. His behavior is normal. Judgment and thought content normal.   Nursing note and vitals reviewed.        REVIEWED DATA:    Labs:     Lab Results   Component Value Date     06/01/2018    K 4.5 06/01/2018    AST 12 04/23/2018    ALT 13 04/23/2018    BUN 14 06/01/2018    CREATININE 0.87 06/01/2018    CREATININE 0.82 04/26/2018    CREATININE 0.89 04/23/2018    EGFRIFNONA 91 06/01/2018    EGFRIFAFRI 110 06/01/2018       Lab Results   Component Value Date    GLUCOSE 89 09/08/2017    GLUCOSE 107 (H) 01/16/2017       Lab Results    Component Value Date    LDL 65 01/16/2017    HDL 41 01/16/2017    TRIG 197 (H) 01/16/2017       Lab Results   Component Value Date    TSH 3.500 11/01/2017       Lab Results   Component Value Date    WBC 10.04 (H) 04/11/2018    HGB 15.4 04/11/2018    HGB 14.9 09/08/2017    HGB 15.8 09/06/2017     04/11/2018       No results found for: PSA      Imaging:         Medical Tests:         Summary of old records / correspondence / consultant report:         Request outside records:         ALLERGIES  No Known Allergies     MEDICATIONS  Current Outpatient Prescriptions   Medication Sig Dispense Refill   • allopurinol (ZYLOPRIM) 300 MG tablet Take 1 tablet by mouth Daily. 90 tablet 1   • amitriptyline (ELAVIL) 100 MG tablet Take 1 tablet by mouth At Night As Needed for Sleep. 90 tablet 0   • amitriptyline (ELAVIL) 25 MG tablet Take 2 tablets by mouth Every Night. 180 tablet 0   • azithromycin (ZITHROMAX Z-MEHNAZ) 250 MG tablet Take 2 tablets the first day, then 1 tablet daily for 4 days. 6 tablet 0   • enalapril (VASOTEC) 2.5 MG tablet Take 1 tablet by mouth Daily. 90 tablet 1   • Gabapentin Enacarbil ER (HORIZANT) 600 MG tablet controlled-release Take 600 mg by mouth Every 12 (Twelve) Hours. 60 tablet 2     No current facility-administered medications for this visit.        PFSH:     The following portions of the patient's history were reviewed and updated as appropriate: Allergies / Current Medications / Past Medical History / Surgical History / Social History / Family History    PROBLEM LIST   Patient Active Problem List   Diagnosis   • Essential hypertension   • Irritable bowel syndrome   • Gout   • Reduced libido   • Carpal tunnel syndrome   • Abdominal hernia   • Osteoporosis   • Other specific arthropathies, not elsewhere classified, unspecified site   • Cervical radiculopathy   • Chronic insomnia   • Carotid artery calcification   • Abnormal finding on imaging   • Peripheral polyneuropathy   • Osteonecrosis of  left hip (CMS/HCC)   • Pain and swelling of left lower leg   • Venous insufficiency of both lower extremities   • Foot erythema   • Swelling of foot joint, left   • Pain and swelling of toe of left foot   • Foot pain, left   • Avascular necrosis (CMS/HCC)   • Osteopenia of spine   • Testosterone deficiency   • Complex regional pain syndrome i of left lower limb       PAST MEDICAL HISTORY  Past Medical History:   Diagnosis Date   • Adverse effect of anesthesia     PATIENT STATES WAKES UP ANGRY & COMBATIVE   • Arthritis    • CTS (carpal tunnel syndrome)    • Diverticulosis    • Gout    • H/O Right leg pain 2011   • Hiatal hernia    • Hypertension    • IBS (irritable bowel syndrome)    • Osteonecrosis (CMS/HCC)    • Osteoporosis        SURGICAL HISTORY  Past Surgical History:   Procedure Laterality Date   • EXTENSOR TENDON OF FOREARM / WRIST REPAIR Bilateral    • FOOT FRACTURE SURGERY     • FOOT NEUROMA SURGERY Right 2003    Mccabe's neuroma   • HERNIA REPAIR      BILATERAL INGUINAL   • OTHER SURGICAL HISTORY Right 2007    Elbow surgery   • UMBILICAL HERNIA REPAIR N/A 1/16/2017    Procedure: UMBILICAL HERNIA REPAIR;  Surgeon: Fernando Montes Jr., MD;  Location: [facility];  Service:        SOCIAL HISTORY  Social History     Social History   • Marital status:      Spouse name: Padma   • Years of education: College     Occupational History   • Business owner      [employer]     Social History Main Topics   • Smoking status: Former Smoker     Packs/day: 1.50     Years: 20.00     Types: Cigarettes     Quit date: 9/7/2016   • Smokeless tobacco: Never Used   • Alcohol use Yes      Comment: 1 GLASS WINE PER NIGHT   • Drug use: No   • Sexual activity: Defer     Other Topics Concern   • Not on file       FAMILY HISTORY  Family History   Problem Relation Age of Onset   • No Known Problems Mother    • Gout Father    • Psoriasis Sister    • Lymphoma Sister    • Hypertension Brother        Health Maintenance Due   Topic   •  ANNUAL PHYSICAL    • ZOSTER VACCINE (1 of 2)   • TDAP/TD VACCINES (1 - Tdap)   • HEPATITIS C SCREENING    • COLONOSCOPY    • INFLUENZA VACCINE        Overdue health maintenance interventions  reviewed with patient.    Dictated utilizing Dragon voice recognition software

## 2018-10-23 ENCOUNTER — OFFICE VISIT (OUTPATIENT)
Dept: INTERNAL MEDICINE | Age: 53
End: 2018-10-23

## 2018-10-23 VITALS
SYSTOLIC BLOOD PRESSURE: 112 MMHG | OXYGEN SATURATION: 94 % | WEIGHT: 234.8 LBS | DIASTOLIC BLOOD PRESSURE: 76 MMHG | HEART RATE: 95 BPM | HEIGHT: 75 IN | TEMPERATURE: 98 F | BODY MASS INDEX: 29.19 KG/M2

## 2018-10-23 DIAGNOSIS — M87.00 AVASCULAR NECROSIS (HCC): ICD-10-CM

## 2018-10-23 DIAGNOSIS — E79.0 HYPERURICEMIA: ICD-10-CM

## 2018-10-23 DIAGNOSIS — I10 ESSENTIAL HYPERTENSION: Primary | ICD-10-CM

## 2018-10-23 DIAGNOSIS — Z23 ENCOUNTER FOR IMMUNIZATION: ICD-10-CM

## 2018-10-23 DIAGNOSIS — F51.04 CHRONIC INSOMNIA: ICD-10-CM

## 2018-10-23 LAB
CHOLEST SERPL-MCNC: 195 MG/DL (ref 0–200)
HDLC SERPL-MCNC: 47 MG/DL (ref 40–60)
LDLC SERPL CALC-MCNC: 73 MG/DL (ref 0–100)
TRIGL SERPL-MCNC: 376 MG/DL (ref 0–150)
URATE SERPL-MCNC: 5.1 MG/DL (ref 3.4–7)
VLDLC SERPL CALC-MCNC: 75.2 MG/DL (ref 5–40)

## 2018-10-23 PROCEDURE — 99213 OFFICE O/P EST LOW 20 MIN: CPT | Performed by: INTERNAL MEDICINE

## 2018-10-23 PROCEDURE — 90674 CCIIV4 VAC NO PRSV 0.5 ML IM: CPT | Performed by: INTERNAL MEDICINE

## 2018-10-23 PROCEDURE — 90471 IMMUNIZATION ADMIN: CPT | Performed by: INTERNAL MEDICINE

## 2018-10-23 RX ORDER — AMITRIPTYLINE HYDROCHLORIDE 25 MG/1
50 TABLET, FILM COATED ORAL NIGHTLY
Qty: 180 TABLET | Refills: 1 | Status: SHIPPED | OUTPATIENT
Start: 2018-10-23 | End: 2019-02-19 | Stop reason: SDUPTHER

## 2018-10-23 RX ORDER — MELOXICAM 15 MG/1
15 TABLET ORAL
Refills: 1 | COMMUNITY
Start: 2018-08-27 | End: 2018-10-23

## 2018-10-23 RX ORDER — ENALAPRIL MALEATE 5 MG/1
5 TABLET ORAL DAILY
Refills: 0 | COMMUNITY
Start: 2018-09-24 | End: 2018-10-23 | Stop reason: SDUPTHER

## 2018-10-23 RX ORDER — ALLOPURINOL 300 MG/1
300 TABLET ORAL DAILY
Qty: 90 TABLET | Refills: 1 | Status: SHIPPED | OUTPATIENT
Start: 2018-10-23 | End: 2019-02-19 | Stop reason: SDUPTHER

## 2018-10-23 RX ORDER — AMITRIPTYLINE HYDROCHLORIDE 100 MG/1
100 TABLET, FILM COATED ORAL NIGHTLY PRN
Qty: 90 TABLET | Refills: 1 | Status: SHIPPED | OUTPATIENT
Start: 2018-10-23 | End: 2019-02-19 | Stop reason: SDUPTHER

## 2018-10-23 RX ORDER — ENALAPRIL MALEATE 5 MG/1
5 TABLET ORAL DAILY
Qty: 90 TABLET | Refills: 1 | Status: SHIPPED | OUTPATIENT
Start: 2018-10-23 | End: 2019-02-19 | Stop reason: SDUPTHER

## 2018-10-23 NOTE — PROGRESS NOTES
"[facility] [place]  MD Zachary BOLES A Washle / 53 y.o. / male  10/23/2018      ASSESSMENT & PLAN:    Problem List Items Addressed This Visit        Unprioritized    Essential hypertension - Primary    Relevant Medications    enalapril (VASOTEC) 5 MG tablet    Other Relevant Orders    Lipid Panel    Avascular necrosis (CMS/HCC)    Overview     Noted left hip several left metatarsal bones April 2018           Other Visit Diagnoses     Hyperuricemia        Relevant Orders    Uric Acid    Encounter for immunization        Relevant Orders    Flucelvax Quad=>4Years (PFS)        Orders Placed This Encounter   Procedures   • Flucelvax Quad=>4Years (PFS)   • Lipid Panel   • Uric Acid       Summary/Discussion:    Number-one hypertension stable on current meds, continue same, blood pressure check 4 months.    #2 hyperuricemia status to be determined.  Check uric acid adjust dosage of allopurinol if need be.    #3 avascular necrosis, Route upcoming surgical replacement of hip.    #4 immunization update, flu shot today.      Return in about 4 months (around 2/23/2019) for Blood pressure check.    ____________________________________________________________________    VITALS    Visit Vitals  /76   Pulse 95   Temp 98 °F (36.7 °C)   Ht 190.5 cm (75\")   Wt 107 kg (234 lb 12.8 oz)   SpO2 94%   BMI 29.35 kg/m²       BP Readings from Last 3 Encounters:   10/23/18 112/76   08/30/18 120/66   08/07/18 136/87     Wt Readings from Last 3 Encounters:   10/23/18 107 kg (234 lb 12.8 oz)   08/30/18 105 kg (231 lb 3.2 oz)   08/07/18 106 kg (234 lb 3.2 oz)      Body mass index is 29.35 kg/m².    CC:  Main reason(s) for today's visit: Hypertension      HPI:     Patient presents as afternoon for follow-up of several medical issues.    HTN: He is compliant with medication, dietary salt restriction, exercise is limited at this time because of ongoing issues with avascular necrosis.  There are no medication side effects " noted.    Hyperuricemia on allopurinol, patient is not had any acute gouty attacks in the past 6-12 months.    Avascular necrosis.  Patient is scheduled for a left hip replacement  November of this year.  We will also need a subsequent replacement on the right side.  It was determined that this is most likely secondary to prior steroid treatment of 5-6 years duration.    Laboratory review June 2018 BMP normal,    Patient Care Team:  Olvin Cotter MD as PCP - General  Olvin Cotter MD as Referring Physician (Internal Medicine)  ____________________________________________________________________    REVIEW OF SYSTEMS    Review of Systems   Respiratory: Negative for chest tightness and shortness of breath.    Cardiovascular: Negative for chest pain and palpitations.   Neurological: Negative for dizziness, syncope, speech difficulty, weakness, light-headedness and headaches.         PHYSICAL EXAMINATION    Physical Exam   Constitutional: He is oriented to person, place, and time. He appears well-developed and well-nourished. No distress.   Cardiovascular: Normal rate, regular rhythm, normal heart sounds and intact distal pulses.  Exam reveals no gallop and no friction rub.    No murmur heard.  Pulmonary/Chest: Effort normal and breath sounds normal. He has no wheezes. He has no rales.   Musculoskeletal: He exhibits no edema.   Neurological: He is alert and oriented to person, place, and time.   Skin: Skin is warm and dry. No rash noted.   Psychiatric: He has a normal mood and affect. His behavior is normal. Judgment and thought content normal.   Nursing note and vitals reviewed.        REVIEWED DATA:    Labs:     Lab Results   Component Value Date     06/01/2018    K 4.5 06/01/2018    AST 12 04/23/2018    ALT 13 04/23/2018    BUN 14 06/01/2018    CREATININE 0.87 06/01/2018    CREATININE 0.82 04/26/2018    CREATININE 0.89 04/23/2018    EGFRIFNONA 91 06/01/2018    EGFRIFAFRI 110 06/01/2018       Lab Results    Component Value Date    GLUCOSE 89 09/08/2017    GLUCOSE 107 (H) 01/16/2017       Lab Results   Component Value Date    LDL 65 01/16/2017    HDL 41 01/16/2017    TRIG 197 (H) 01/16/2017       Lab Results   Component Value Date    TSH 3.500 11/01/2017       Lab Results   Component Value Date    WBC 10.04 (H) 04/11/2018    HGB 15.4 04/11/2018    HGB 14.9 09/08/2017    HGB 15.8 09/06/2017     04/11/2018       No results found for: PSA      Imaging:         Medical Tests:         Summary of old records / correspondence / consultant report:         Request outside records:         ALLERGIES  No Known Allergies     MEDICATIONS  Current Outpatient Prescriptions   Medication Sig Dispense Refill   • allopurinol (ZYLOPRIM) 300 MG tablet Take 1 tablet by mouth Daily. 90 tablet 1   • amitriptyline (ELAVIL) 100 MG tablet Take 1 tablet by mouth At Night As Needed for Sleep. 90 tablet 0   • amitriptyline (ELAVIL) 25 MG tablet Take 2 tablets by mouth Every Night. 180 tablet 0   • enalapril (VASOTEC) 5 MG tablet Take 5 mg by mouth Daily.  0   • Gabapentin Enacarbil ER (HORIZANT) 600 MG tablet controlled-release Take 600 mg by mouth Every 12 (Twelve) Hours. 60 tablet 2     No current facility-administered medications for this visit.        PFSH:     The following portions of the patient's history were reviewed and updated as appropriate: Allergies / Current Medications / Past Medical History / Surgical History / Social History / Family History    PROBLEM LIST   Patient Active Problem List   Diagnosis   • Essential hypertension   • Irritable bowel syndrome   • Gout   • Reduced libido   • Carpal tunnel syndrome   • Abdominal hernia   • Osteoporosis   • Other specific arthropathies, not elsewhere classified, unspecified site   • Cervical radiculopathy   • Chronic insomnia   • Carotid artery calcification   • Abnormal finding on imaging   • Peripheral polyneuropathy   • Osteonecrosis of left hip (CMS/HCC)   • Pain and swelling  of left lower leg   • Venous insufficiency of both lower extremities   • Foot erythema   • Swelling of foot joint, left   • Pain and swelling of toe of left foot   • Foot pain, left   • Avascular necrosis (CMS/HCC)   • Osteopenia of spine   • Testosterone deficiency   • Complex regional pain syndrome i of left lower limb       PAST MEDICAL HISTORY  Past Medical History:   Diagnosis Date   • Adverse effect of anesthesia     PATIENT STATES WAKES UP ANGRY & COMBATIVE   • Arthritis    • CTS (carpal tunnel syndrome)    • Diverticulosis    • Gout    • H/O Right leg pain 2011   • Hiatal hernia    • Hypertension    • IBS (irritable bowel syndrome)    • Osteonecrosis (CMS/HCC)    • Osteoporosis        SURGICAL HISTORY  Past Surgical History:   Procedure Laterality Date   • EXTENSOR TENDON OF FOREARM / WRIST REPAIR Bilateral    • FOOT FRACTURE SURGERY     • FOOT NEUROMA SURGERY Right 2003    Mccabe's neuroma   • HERNIA REPAIR      BILATERAL INGUINAL   • OTHER SURGICAL HISTORY Right 2007    Elbow surgery   • UMBILICAL HERNIA REPAIR N/A 1/16/2017    Procedure: UMBILICAL HERNIA REPAIR;  Surgeon: Fernando Montes Jr., MD;  Location: [facility] OR;  Service:        SOCIAL HISTORY  Social History     Social History   • Marital status:      Spouse name: Padma   • Years of education: College     Occupational History   • Business owner      [employer]     Social History Main Topics   • Smoking status: Former Smoker     Packs/day: 1.50     Years: 20.00     Types: Cigarettes     Quit date: 9/7/2016   • Smokeless tobacco: Never Used   • Alcohol use Yes      Comment: 1 GLASS WINE PER NIGHT   • Drug use: No   • Sexual activity: Defer     Other Topics Concern   • Not on file       FAMILY HISTORY  Family History   Problem Relation Age of Onset   • No Known Problems Mother    • Gout Father    • Psoriasis Sister    • Lymphoma Sister    • Hypertension Brother        Health Maintenance Due   Topic   • ANNUAL PHYSICAL    • ZOSTER VACCINE (1 of 2)    • TDAP/TD VACCINES (1 - Tdap)   • HEPATITIS C SCREENING    • COLONOSCOPY    • INFLUENZA VACCINE    • LUNG CANCER SCREENING        Overdue health maintenance interventions  reviewed with patient.    Dictated utilizing Dragon voice recognition software

## 2018-10-30 ENCOUNTER — OFFICE VISIT (OUTPATIENT)
Dept: PAIN MEDICINE | Facility: CLINIC | Age: 53
End: 2018-10-30

## 2018-10-30 VITALS
BODY MASS INDEX: 29.09 KG/M2 | WEIGHT: 234 LBS | SYSTOLIC BLOOD PRESSURE: 133 MMHG | RESPIRATION RATE: 16 BRPM | HEART RATE: 89 BPM | HEIGHT: 75 IN | OXYGEN SATURATION: 96 % | DIASTOLIC BLOOD PRESSURE: 91 MMHG | TEMPERATURE: 97.8 F

## 2018-10-30 DIAGNOSIS — G89.29 OTHER CHRONIC PAIN: Primary | ICD-10-CM

## 2018-10-30 DIAGNOSIS — M79.89 PAIN AND SWELLING OF LEFT LOWER LEG: ICD-10-CM

## 2018-10-30 DIAGNOSIS — M79.662 PAIN AND SWELLING OF LEFT LOWER LEG: ICD-10-CM

## 2018-10-30 DIAGNOSIS — G90.522 COMPLEX REGIONAL PAIN SYNDROME I OF LEFT LOWER LIMB: ICD-10-CM

## 2018-10-30 PROCEDURE — 99213 OFFICE O/P EST LOW 20 MIN: CPT | Performed by: NURSE PRACTITIONER

## 2018-10-30 NOTE — PROGRESS NOTES
"CHIEF COMPLAINT  Pt states L lower leg pain is \"much better\".  Pt to have a L THR on 11/19/18 at [place].    Subjective   Zachary Gerard is a 53 y.o. male  who presents to the office for follow-up.He has a history of chronic pain of the left lower leg, CRPS of this extremity.    Left Lumbar sympathetic block   on  7-17-18, 6-28-18, and 6-07-18 performed by Dr. Flores for management of left foot pain. Patient reports 60% relief from the procedure which is ongoing.  Reports that it made all the difference in the world.      Scheduled for left total hip replacement 11/19/18.      Has been taking Gralise 600 mg BID.  He reports a significant difference without using this.  Denies adverse reactions.      Hip Pain    The incident occurred more than 1 week ago. The pain is present in the left hip. The quality of the pain is described as aching (throbbing). The pain is at a severity of 4/10. The pain is moderate. The pain has been constant since onset. Associated symptoms include a loss of motion. Pertinent negatives include no numbness. The symptoms are aggravated by movement and weight bearing. He has tried acetaminophen, ice, non-weight bearing and NSAIDs (horizant) for the symptoms. The treatment provided mild relief.   Foot Pain   This is a chronic problem. The current episode started more than 1 month ago. The problem occurs constantly. The problem has been gradually improving. Pertinent negatives include no chest pain, congestion, coughing, headaches, nausea, numbness or weakness. Treatments tried: horizant, lumbar sympathetic block. The treatment provided significant relief.      PEG Assessment   What number best describes your pain on average in the past week?4  What number best describes how, during the past week, pain has interfered with your enjoyment of life?5  What number best describes how, during the past week, pain has interfered with your general activity?  5    The following portions of the patient's " "history were reviewed and updated as appropriate: allergies, current medications, past family history, past medical history, past social history, past surgical history and problem list.    Review of Systems   Constitutional: Positive for activity change (increased). Negative for appetite change.   HENT: Negative for congestion and trouble swallowing.    Eyes: Negative for visual disturbance.   Respiratory: Negative for apnea, cough, chest tightness and shortness of breath.    Cardiovascular: Negative for chest pain and leg swelling.   Gastrointestinal: Negative for constipation, diarrhea and nausea.   Genitourinary: Negative for difficulty urinating and dysuria.   Musculoskeletal: Positive for gait problem.   Neurological: Negative for dizziness, weakness, light-headedness, numbness and headaches.   Psychiatric/Behavioral: Positive for sleep disturbance. Negative for agitation, confusion, hallucinations and suicidal ideas.       Vitals:    10/30/18 1139   BP: 133/91   Pulse: 89   Resp: 16   Temp: 97.8 °F (36.6 °C)   SpO2: 96%   Weight: 106 kg (234 lb)   Height: 190.5 cm (75\")   PainSc: 4  Comment: L lower leg pain ranges from 4-7/10   PainLoc: Leg     Objective   Physical Exam   Constitutional: He is oriented to person, place, and time. He appears well-developed and well-nourished. No distress.   HENT:   Head: Atraumatic.   Eyes: Conjunctivae and EOM are normal.   Neck: Normal range of motion. Neck supple.   Cardiovascular: Normal rate.    Pulmonary/Chest: Effort normal. No respiratory distress.   Musculoskeletal:        Left lower leg: He exhibits tenderness.   Neurological: He is alert and oriented to person, place, and time. Gait (limping - mild) abnormal.   Skin: Skin is warm and dry. He is not diaphoretic.   Psychiatric: He has a normal mood and affect. His behavior is normal.   Nursing note and vitals reviewed.      Assessment/Plan   Zachary was seen today for leg pain.    Diagnoses and all orders for this " visit:    Other chronic pain    Complex regional pain syndrome i of left lower limb    Pain and swelling of left lower leg    Other orders  -     Gabapentin Enacarbil ER (HORIZANT) 600 MG tablet controlled-release; Take 600 mg by mouth Every 12 (Twelve) Hours.      --- Refill Gralise  --- F/u after hip surgery  --- Can repeat lumbar sympathetic blocks PRN  --- Consider DRG/SCS in the future if needed - has been introduced to the therapy   --- Follow-up 3 months          RAMONA REPORT  As part of the patient's treatment plan, I am prescribing controlled substances. The patient has been made aware of appropriate use of such medications, including potential risk of somnolence, limited ability to drive and/or work safely, and the potential for dependence or overdose. It has also bee made clear that these medications are for use by this patient only, without concomitant use of alcohol or other substances unless prescribed.     Patient has completed prescribing agreement detailing terms of continued prescribing of controlled substances, including monitoring RAMONA reports, urine drug screening, and pill counts if necessary. The patient is aware that inappropriate use will results in cessation of prescribing such medications.    RAMONA report has been reviewed and scanned into the patient's chart.    As the clinician, I personally reviewed the RAMONA from 10/29/2018 while the patient was in the office today.    History and physical exam exhibit continued safe and appropriate use of controlled substances.    EMR Dragon/Transcription disclaimer:   Much of this encounter note is an electronic transcription/translation of spoken language to printed text. The electronic translation of spoken language may permit erroneous, or at times, nonsensical words or phrases to be inadvertently transcribed; Although I have reviewed the note for such errors, some may still exist.

## 2018-12-05 ENCOUNTER — TELEPHONE (OUTPATIENT)
Dept: INTERNAL MEDICINE | Age: 53
End: 2018-12-05

## 2018-12-05 NOTE — TELEPHONE ENCOUNTER
Patient's wife called and the patient is in [place] for work. He is returning on Friday or Saturday of this week. He is complaining of neck pain that got pretty severe so he went to see a chiropractor in [place]. Chiropractor took and x-ray and said he needed to see his PCP upon returning to [place]. He has had these symptoms about 2 weeks.     Wife is going to callback either Friday or Monday of next week to schedule a same day appt with Cyndee.

## 2018-12-31 RX ORDER — AMITRIPTYLINE HYDROCHLORIDE 100 MG/1
TABLET, FILM COATED ORAL
Qty: 30 TABLET | Refills: 5 | Status: SHIPPED | OUTPATIENT
Start: 2018-12-31 | End: 2019-02-19 | Stop reason: SDUPTHER

## 2019-01-02 NOTE — TELEPHONE ENCOUNTER
Pt called and stated he had his Gabapentin Enacarbil ER (Horizant) transferred to [facility] in [place], [place]. He states they can not transfer it back to his [facility] pharmacy in [facility] and he will need a refill sent there now (he has 4 refills). Can you please re-send this to his pharmacy here?

## 2019-01-29 DIAGNOSIS — M16.12 PRIMARY OSTEOARTHRITIS OF LEFT HIP: Primary | ICD-10-CM

## 2019-01-29 RX ORDER — PREGABALIN 75 MG/1
150 CAPSULE ORAL ONCE
Status: CANCELLED | OUTPATIENT
Start: 2019-03-22 | End: 2019-01-29

## 2019-01-29 RX ORDER — MELOXICAM 15 MG/1
15 TABLET ORAL ONCE
Status: CANCELLED | OUTPATIENT
Start: 2019-03-22 | End: 2019-01-29

## 2019-01-29 RX ORDER — CEFAZOLIN SODIUM 2 G/100ML
2 INJECTION, SOLUTION INTRAVENOUS ONCE
Status: CANCELLED | OUTPATIENT
Start: 2019-03-22 | End: 2019-01-29

## 2019-01-31 PROBLEM — M16.12 PRIMARY OSTEOARTHRITIS OF LEFT HIP: Status: ACTIVE | Noted: 2019-01-31

## 2019-02-19 ENCOUNTER — OFFICE VISIT (OUTPATIENT)
Dept: FAMILY MEDICINE CLINIC | Facility: CLINIC | Age: 54
End: 2019-02-19

## 2019-02-19 VITALS
HEIGHT: 75 IN | HEART RATE: 93 BPM | BODY MASS INDEX: 29.57 KG/M2 | SYSTOLIC BLOOD PRESSURE: 126 MMHG | RESPIRATION RATE: 18 BRPM | DIASTOLIC BLOOD PRESSURE: 80 MMHG | TEMPERATURE: 98.4 F | WEIGHT: 237.8 LBS | OXYGEN SATURATION: 96 %

## 2019-02-19 DIAGNOSIS — F51.04 CHRONIC INSOMNIA: ICD-10-CM

## 2019-02-19 DIAGNOSIS — H66.93 BILATERAL ACUTE OTITIS MEDIA: Primary | ICD-10-CM

## 2019-02-19 DIAGNOSIS — R05.9 COUGH: ICD-10-CM

## 2019-02-19 PROCEDURE — 99214 OFFICE O/P EST MOD 30 MIN: CPT | Performed by: NURSE PRACTITIONER

## 2019-02-19 PROCEDURE — 87804 INFLUENZA ASSAY W/OPTIC: CPT | Performed by: NURSE PRACTITIONER

## 2019-02-19 RX ORDER — ALBUTEROL SULFATE 90 UG/1
2 AEROSOL, METERED RESPIRATORY (INHALATION) EVERY 4 HOURS PRN
Qty: 1 INHALER | Refills: 3 | Status: SHIPPED | OUTPATIENT
Start: 2019-02-19 | End: 2019-03-07

## 2019-02-19 RX ORDER — ENALAPRIL MALEATE 5 MG/1
5 TABLET ORAL DAILY
Qty: 90 TABLET | Refills: 1 | Status: SHIPPED | OUTPATIENT
Start: 2019-02-19 | End: 2019-05-17 | Stop reason: SDUPTHER

## 2019-02-19 RX ORDER — CEFDINIR 300 MG/1
300 CAPSULE ORAL 2 TIMES DAILY
Qty: 20 CAPSULE | Refills: 0 | Status: SHIPPED | OUTPATIENT
Start: 2019-02-19 | End: 2019-03-07

## 2019-02-19 RX ORDER — ALLOPURINOL 300 MG/1
300 TABLET ORAL DAILY
Qty: 90 TABLET | Refills: 1 | Status: SHIPPED | OUTPATIENT
Start: 2019-02-19 | End: 2019-05-17 | Stop reason: SDUPTHER

## 2019-02-19 RX ORDER — AMITRIPTYLINE HYDROCHLORIDE 25 MG/1
50 TABLET, FILM COATED ORAL NIGHTLY
Qty: 180 TABLET | Refills: 1 | Status: SHIPPED | OUTPATIENT
Start: 2019-02-19 | End: 2019-05-17 | Stop reason: SDUPTHER

## 2019-02-19 RX ORDER — AMITRIPTYLINE HYDROCHLORIDE 100 MG/1
100 TABLET, FILM COATED ORAL NIGHTLY PRN
Qty: 90 TABLET | Refills: 1 | Status: SHIPPED | OUTPATIENT
Start: 2019-02-19 | End: 2019-05-17 | Stop reason: SDUPTHER

## 2019-02-19 NOTE — PROGRESS NOTES
Subjective   Zachary Gerard is a 53 y.o. male.   Chief Complaint   Patient presents with   • Sore Throat     has been going on for about 4 days    • Diarrhea     has gotten better   • Nasal Congestion     Vitals:    02/19/19 1243   BP: 126/80   Pulse: 93   Resp: 18   Temp: 98.4 °F (36.9 °C)   SpO2: 96%     No LMP for male patient.    History of Present Illness Zachary is here to establish care. He was a previous patient of Dr Cotter. I reviewed his records in Epic. He has a history of insomnia,  Gout, HTN, and and peripheral neuropathy. He sees pain management and is on gabapentin. He is compliant with his medication and is not experiencing any side effects.   He c/o sore throat, nasal congestion, cough, wheezing for 4 days. He has had diarrhea but that is resolving. He did get his flu vaccine.    The following portions of the patient's history were reviewed and updated as appropriate: allergies, current medications, past family history, past medical history, past social history, past surgical history and problem list.    Review of Systems   Constitutional: Positive for fatigue. Negative for chills and fever.   HENT: Positive for congestion, ear pain, postnasal drip and sore throat.    Eyes: Negative for visual disturbance.   Respiratory: Positive for wheezing. Negative for chest tightness and shortness of breath.    Cardiovascular: Negative.    Gastrointestinal: Positive for diarrhea (which is resolving ). Negative for nausea and vomiting.   Genitourinary: Negative.    Musculoskeletal: Negative for arthralgias.   Neurological: Negative.    Psychiatric/Behavioral: Positive for sleep disturbance. Negative for dysphoric mood. The patient is not nervous/anxious.        Objective   Physical Exam   Constitutional: Vital signs are normal. He appears well-developed and well-nourished. He appears ill. No distress.   HENT:   Right Ear: Hearing, external ear and ear canal normal. Tympanic membrane is injected. A middle ear  effusion is present.   Left Ear: Hearing, external ear and ear canal normal. Tympanic membrane is injected. A middle ear effusion is present.   Nose: Mucosal edema and rhinorrhea present.   Mouth/Throat: Uvula is midline and mucous membranes are normal. Posterior oropharyngeal erythema present.   Cardiovascular: Normal rate, regular rhythm and normal heart sounds.   Pulmonary/Chest: Effort normal and breath sounds normal.   Neurological: He is alert.   Skin: Skin is warm, dry and intact.   Psychiatric: He has a normal mood and affect.       Assessment/Plan   Zachary was seen today for sore throat, diarrhea and nasal congestion.    Diagnoses and all orders for this visit:    Bilateral acute otitis media    Chronic insomnia  -     amitriptyline (ELAVIL) 25 MG tablet; Take 2 tablets by mouth Every Night.    Cough  -     POC Influenza A / B    Other orders  -     enalapril (VASOTEC) 5 MG tablet; Take 1 tablet by mouth Daily.  -     amitriptyline (ELAVIL) 100 MG tablet; Take 1 tablet by mouth At Night As Needed for Sleep.  -     cefdinir (OMNICEF) 300 MG capsule; Take 1 capsule by mouth 2 (Two) Times a Day.  -     albuterol sulfate  (90 Base) MCG/ACT inhaler; Inhale 2 puffs Every 4 (Four) Hours As Needed for Wheezing.  -     allopurinol (ZYLOPRIM) 300 MG tablet; Take 1 tablet by mouth Daily.        Flu swab negative  Take medications as prescribed  Albuterol as needed for wheezing  His blood pressure is well controlled  Medications were refilled  Follow up in April with labs lipid panel, cmp, hepatitis c screening cbc, tsh, UA, and vitamin D or sooner if needed

## 2019-02-20 ENCOUNTER — OFFICE VISIT (OUTPATIENT)
Dept: PAIN MEDICINE | Facility: CLINIC | Age: 54
End: 2019-02-20

## 2019-02-20 VITALS
BODY MASS INDEX: 29.22 KG/M2 | RESPIRATION RATE: 16 BRPM | HEART RATE: 89 BPM | SYSTOLIC BLOOD PRESSURE: 136 MMHG | TEMPERATURE: 98.2 F | OXYGEN SATURATION: 93 % | HEIGHT: 75 IN | DIASTOLIC BLOOD PRESSURE: 87 MMHG | WEIGHT: 235 LBS

## 2019-02-20 DIAGNOSIS — M79.662 PAIN AND SWELLING OF LEFT LOWER LEG: ICD-10-CM

## 2019-02-20 DIAGNOSIS — G89.29 OTHER CHRONIC PAIN: Primary | ICD-10-CM

## 2019-02-20 DIAGNOSIS — G90.522 COMPLEX REGIONAL PAIN SYNDROME I OF LEFT LOWER LIMB: ICD-10-CM

## 2019-02-20 DIAGNOSIS — M79.89 PAIN AND SWELLING OF LEFT LOWER LEG: ICD-10-CM

## 2019-02-20 PROCEDURE — 99213 OFFICE O/P EST LOW 20 MIN: CPT | Performed by: NURSE PRACTITIONER

## 2019-02-20 NOTE — PROGRESS NOTES
CHIEF COMPLAINT  Pt states L lower leg pain is unchanged,tolerably controlled overall.  Pt to have L THR on 3/22/19 per Dr Avila.    Subjective   Zcahary Gerard is a 53 y.o. male  who presents to the office for follow-up.He has a history of chronic pain of the left foot - CRPS.    Left Lumbar sympathetic block   on  7-17-18, 6-28-18, and 6-07-18 -- 60% relief or greater, ongoing    L Total Hip replacement 3/22/19 - this is his primary pain complaint today      Has been taking Horizant 600 mg BID.  He reports a significant difference without using this.  Denies adverse reactions.      Hip Pain    The incident occurred more than 1 week ago. The pain is present in the left hip. The quality of the pain is described as aching (throbbing). The pain is at a severity of 5/10. The pain is moderate. The pain has been constant since onset. Associated symptoms include a loss of motion. Pertinent negatives include no numbness. The symptoms are aggravated by movement and weight bearing. He has tried acetaminophen, ice, non-weight bearing and NSAIDs (horizant) for the symptoms. The treatment provided mild relief.   Foot Pain   This is a chronic problem. The current episode started more than 1 month ago. The problem occurs constantly. The problem has been gradually improving. Associated symptoms include weakness (L leg). Pertinent negatives include no chest pain, congestion, coughing, headaches, nausea or numbness. Arthralgias: L hip pain. Treatments tried: horizant, lumbar sympathetic block. The treatment provided significant relief.      PEG Assessment   What number best describes your pain on average in the past week?5  What number best describes how, during the past week, pain has interfered with your enjoyment of life?3  What number best describes how, during the past week, pain has interfered with your general activity?  5    The following portions of the patient's history were reviewed and updated as appropriate: allergies,  "current medications, past family history, past medical history, past social history, past surgical history and problem list.    Review of Systems   Constitutional: Negative for activity change (increased) and appetite change.   HENT: Negative for congestion and trouble swallowing.    Eyes: Negative for visual disturbance.   Respiratory: Negative for apnea, cough, chest tightness and shortness of breath.    Cardiovascular: Negative for chest pain and leg swelling.   Gastrointestinal: Negative for constipation, diarrhea and nausea.   Genitourinary: Negative for difficulty urinating and dysuria.   Musculoskeletal: Positive for gait problem. Arthralgias: L hip pain.   Neurological: Positive for weakness (L leg). Negative for dizziness, light-headedness, numbness and headaches.   Psychiatric/Behavioral: Positive for sleep disturbance. Negative for agitation, confusion, hallucinations and suicidal ideas.     Vitals:    02/20/19 1317   BP: 136/87   Pulse: 89   Resp: 16   Temp: 98.2 °F (36.8 °C)   SpO2: 93%   Weight: 107 kg (235 lb)   Height: 190.5 cm (75\")   PainSc: 5  Comment: L lower leg pain ranges from 4-6/10     Objective   Physical Exam   Constitutional: He is oriented to person, place, and time. He appears well-developed and well-nourished. No distress.   HENT:   Head: Atraumatic.   Eyes: Conjunctivae and EOM are normal.   Neck: Normal range of motion. Neck supple.   Cardiovascular: Normal rate.   Pulmonary/Chest: Effort normal. No respiratory distress.   Musculoskeletal:        Left hip: He exhibits tenderness.        Left lower leg: He exhibits no tenderness.        Left foot: There is no tenderness.   Neurological: He is alert and oriented to person, place, and time. He has normal strength. No sensory deficit. Gait (limping - mild) abnormal.   Skin: Skin is warm and dry. He is not diaphoretic.   Psychiatric: He has a normal mood and affect. His behavior is normal.   Nursing note and vitals " reviewed.    Assessment/Plan   Zachary was seen today for leg pain.    Diagnoses and all orders for this visit:    Other chronic pain    Pain and swelling of left lower leg    Complex regional pain syndrome i of left lower limb      --- Can repeat left lumbar sympathetic block prn  --- Continue Horizant    --- Follow-up 6 months/prn          RAMONA REPORT    RAMONA report has been reviewed and scanned into the patient's chart.    As the clinician, I personally reviewed the RAMONA from 2/18/19 while the patient was in the office today.    EMR Dragon/Transcription disclaimer:   Much of this encounter note is an electronic transcription/translation of spoken language to printed text. The electronic translation of spoken language may permit erroneous, or at times, nonsensical words or phrases to be inadvertently transcribed; Although I have reviewed the note for such errors, some may still exist.

## 2019-03-07 ENCOUNTER — APPOINTMENT (OUTPATIENT)
Dept: PREADMISSION TESTING | Facility: HOSPITAL | Age: 54
End: 2019-03-07

## 2019-03-07 VITALS
BODY MASS INDEX: 29.56 KG/M2 | OXYGEN SATURATION: 94 % | RESPIRATION RATE: 16 BRPM | DIASTOLIC BLOOD PRESSURE: 79 MMHG | HEART RATE: 100 BPM | SYSTOLIC BLOOD PRESSURE: 137 MMHG | HEIGHT: 75 IN | TEMPERATURE: 97.6 F | WEIGHT: 237.7 LBS

## 2019-03-07 DIAGNOSIS — M16.12 PRIMARY OSTEOARTHRITIS OF LEFT HIP: ICD-10-CM

## 2019-03-07 LAB
ANION GAP SERPL CALCULATED.3IONS-SCNC: 12.9 MMOL/L
BILIRUB UR QL STRIP: NEGATIVE
BUN BLD-MCNC: 13 MG/DL (ref 6–20)
BUN/CREAT SERPL: 13.5 (ref 7–25)
CALCIUM SPEC-SCNC: 9.2 MG/DL (ref 8.6–10.5)
CHLORIDE SERPL-SCNC: 98 MMOL/L (ref 98–107)
CLARITY UR: CLEAR
CO2 SERPL-SCNC: 26.1 MMOL/L (ref 22–29)
COLOR UR: YELLOW
CREAT BLD-MCNC: 0.96 MG/DL (ref 0.76–1.27)
DEPRECATED RDW RBC AUTO: 46.2 FL (ref 37–54)
ERYTHROCYTE [DISTWIDTH] IN BLOOD BY AUTOMATED COUNT: 12.9 % (ref 12.3–15.4)
GFR SERPL CREATININE-BSD FRML MDRD: 81 ML/MIN/1.73
GLUCOSE BLD-MCNC: 164 MG/DL (ref 65–99)
GLUCOSE UR STRIP-MCNC: NEGATIVE MG/DL
HCT VFR BLD AUTO: 48.3 % (ref 37.5–51)
HGB BLD-MCNC: 16.3 G/DL (ref 13–17.7)
HGB UR QL STRIP.AUTO: NEGATIVE
KETONES UR QL STRIP: NEGATIVE
LEUKOCYTE ESTERASE UR QL STRIP.AUTO: NEGATIVE
MCH RBC QN AUTO: 33.1 PG (ref 26.6–33)
MCHC RBC AUTO-ENTMCNC: 33.7 G/DL (ref 31.5–35.7)
MCV RBC AUTO: 98 FL (ref 79–97)
NITRITE UR QL STRIP: NEGATIVE
PH UR STRIP.AUTO: 5.5 [PH] (ref 5–8)
PLATELET # BLD AUTO: 189 10*3/MM3 (ref 140–450)
PMV BLD AUTO: 9.7 FL (ref 6–12)
POTASSIUM BLD-SCNC: 3.9 MMOL/L (ref 3.5–5.2)
PROT UR QL STRIP: NEGATIVE
RBC # BLD AUTO: 4.93 10*6/MM3 (ref 4.14–5.8)
SODIUM BLD-SCNC: 137 MMOL/L (ref 136–145)
SP GR UR STRIP: 1.02 (ref 1–1.03)
UROBILINOGEN UR QL STRIP: NORMAL
WBC NRBC COR # BLD: 7.81 10*3/MM3 (ref 3.4–10.8)

## 2019-03-07 PROCEDURE — 81003 URINALYSIS AUTO W/O SCOPE: CPT | Performed by: ORTHOPAEDIC SURGERY

## 2019-03-07 PROCEDURE — 36415 COLL VENOUS BLD VENIPUNCTURE: CPT

## 2019-03-07 PROCEDURE — 80048 BASIC METABOLIC PNL TOTAL CA: CPT | Performed by: ORTHOPAEDIC SURGERY

## 2019-03-07 PROCEDURE — 93010 ELECTROCARDIOGRAM REPORT: CPT | Performed by: INTERNAL MEDICINE

## 2019-03-07 PROCEDURE — 93005 ELECTROCARDIOGRAM TRACING: CPT

## 2019-03-07 PROCEDURE — 85027 COMPLETE CBC AUTOMATED: CPT | Performed by: ORTHOPAEDIC SURGERY

## 2019-03-07 RX ORDER — CHLORHEXIDINE GLUCONATE 500 MG/1
CLOTH TOPICAL
COMMUNITY
End: 2019-03-23 | Stop reason: HOSPADM

## 2019-03-07 ASSESSMENT — HOOS JR
HOOS JR SCORE: 43.335
HOOS JR SCORE: 15

## 2019-03-07 NOTE — DISCHARGE INSTRUCTIONS
Take the following medications the morning of surgery with a small sip of water:    ENALAPRIL, GABAPENTIN  General Instructions:  • Do not eat solid food after midnight the night before surgery.  • You may drink clear liquids day of surgery but must stop at least one hour before your hospital arrival time.  • It is beneficial for you to have a clear drink that contains carbohydrates the day of surgery.  We suggest a 12 to 20 ounce bottle of Gatorade or Powerade for non-diabetic patients or a 12 to 20 ounce bottle of G2 or Powerade Zero for diabetic patients. (Pediatric patients, are not advised to drink a 12 to 20 ounce carbohydrate drink)    Clear liquids are liquids you can see through.  Nothing red in color.     Plain water                               Sports drinks  Sodas                                   Gelatin (Jell-O)  Fruit juices without pulp such as white grape juice and apple juice  Popsicles that contain no fruit or yogurt  Tea or coffee (no cream or milk added)  Gatorade / Powerade  G2 / Powerade Zero    • Infants may have breast milk up to four hours before surgery.  • Infants drinking formula may drink formula up to six hours before surgery.   • Patients who avoid smoking, chewing tobacco and alcohol for 4 weeks prior to surgery have a reduced risk of post-operative complications.  Quit smoking as many days before surgery as you can.  • Do not smoke, use chewing tobacco or drink alcohol the day of surgery.   • If applicable bring your C-PAP/ BI-PAP machine.  • Bring any papers given to you in the doctor’s office.  • Wear clean comfortable clothes and socks.  • Do not wear contact lenses or make-up.  Bring a case for your glasses.   • Bring crutches or walker if applicable.  • Remove all piercings.  Leave jewelry and any other valuables at home.  • Hair extensions with metal clips must be removed prior to surgery.  • The Pre-Admission Testing nurse will instruct you to bring medications if unable to  obtain an accurate list in Pre-Admission Testing.        If you were given a blood bank ID arm band remember to bring it with you the day of surgery.    Preventing a Surgical Site Infection:  • For 2 to 3 days before surgery, avoid shaving with a razor because the razor can irritate skin and make it easier to develop an infection.    • Any areas of open skin can increase the risk of a post-operative wound infection by allowing bacteria to enter and travel throughout the body.  Notify your surgeon if you have any skin wounds / rashes even if it is not near the expected surgical site.  The area will need assessed to determine if surgery should be delayed until it is healed.  • The night prior to surgery sleep in a clean bed with clean clothing.  Do not allow pets to sleep with you.  • Shower on the morning of surgery using a fresh bar of anti-bacterial soap (such as Dial) and clean washcloth.  Dry with a clean towel and dress in clean clothing.  • Ask your surgeon if you will be receiving antibiotics prior to surgery.  • Make sure you, your family, and all healthcare providers clean their hands with soap and water or an alcohol based hand  before caring for you or your wound.    Day of surgery:  Upon arrival, a Pre-op nurse and Anesthesiologist will review your health history, obtain vital signs, and answer questions you may have.  The only belongings needed at this time will be your home medications and if applicable your C-PAP/BI-PAP machine.  If you are staying overnight your family can leave the rest of your belongings in the car and bring them to your room later.  A Pre-op nurse will start an IV and you may receive medication in preparation for surgery, including something to help you relax.  Your family will be able to see you in the Pre-op area.  While you are in surgery your family should notify the waiting room  if they leave the waiting room area and provide a contact phone  number.    Please be aware that surgery does come with discomfort.  We want to make every effort to control your discomfort so please discuss any uncontrolled symptoms with your nurse.   Your doctor will most likely have prescribed pain medications.      If you are going home after surgery you will receive individualized written care instructions before being discharged.  A responsible adult must drive you to and from the hospital on the day of your surgery and stay with you for 24 hours.    If you are staying overnight following surgery, you will be transported to your hospital room following the recovery period.  [facility] has all private rooms.    You have received a list of surgical assistants for your reference.  If you have any questions please call Pre-Admission Testing at [phone].  Deductibles and co-payments are collected on the day of service. Please be prepared to pay the required co-pay, deductible or deposit on the day of service as defined by your plan.    2% CHLORAHEXIDINE GLUCONATE* CLOTH  Preparing or “prepping” skin before surgery can reduce the risk of infection at the surgical site. To make the process easier, [facility] has chosen disposable cloths moistened with a rinse-free, 2% Chlorhexidine Gluconate (CHG) antiseptic solution. The steps below outline the prepping process and should be carefully followed.        Use the prep cloth on the area that is circled in the diagram             Directions Night before Surgery  1) Shower using a fresh bar of anti-bacterial soap (such as Dial) and clean washcloth.  Use a clean towel to completely dry your skin.  2) Do not use any lotions, oils or creams on your skin.  3) Open the package and remove 1 cloth, wipe your skin for 30 seconds in a circular motion.  Allow to dry for 3 minutes.  4) Repeat #3 with second cloth.  5) Do not touch your eyes, ears, or mouth with the prep cloth.  6) Allow the wet prep solution to air  dry.  7) Discard the prep cloth and wash your hands with soap and water.   8) Dress in clean bed clothes and sleep on fresh clean bed sheets.   9) You may experience some temporary itching after the prep.    Directions Day of Surgery  1) Repeat steps 1,2,3,4,5,6,7, and 9.   2) Dress in clean clothes before coming to the hospital.    BACTROBAN NASAL OINTMENT  There are many germs normally in your nose. Bactroban is an ointment that will help reduce these germs. Please follow these instructions for Bactroban use:      ____The day before surgery in the morning  Date________    ____The day before surgery in the evening              Date________    ____The day of surgery in the morning    Date________    **Squirt ½ package of Bactroban Ointment onto a cotton applicator and apply to inside of 1st nostril.  Squirt the remaining Bactroban and apply to the inside of the other nostril.    PERIDEX- ORAL:  Use only if your surgeon has ordered  Use the night before and morning of surgery - Swish, gargle, and spit - do not swallow.

## 2019-03-14 ENCOUNTER — OFFICE VISIT (OUTPATIENT)
Dept: ORTHOPEDIC SURGERY | Facility: CLINIC | Age: 54
End: 2019-03-14

## 2019-03-14 VITALS
TEMPERATURE: 98.6 F | SYSTOLIC BLOOD PRESSURE: 138 MMHG | HEIGHT: 75 IN | DIASTOLIC BLOOD PRESSURE: 82 MMHG | BODY MASS INDEX: 29.47 KG/M2 | WEIGHT: 237 LBS

## 2019-03-14 DIAGNOSIS — M16.12 PRIMARY OSTEOARTHRITIS OF LEFT HIP: Primary | ICD-10-CM

## 2019-03-14 PROCEDURE — S0260 H&P FOR SURGERY: HCPCS | Performed by: NURSE PRACTITIONER

## 2019-03-14 PROCEDURE — 73502 X-RAY EXAM HIP UNI 2-3 VIEWS: CPT | Performed by: NURSE PRACTITIONER

## 2019-03-14 NOTE — H&P (VIEW-ONLY)
Patient: Zachary Gerard    Date of Admission: 3/13/22/19    YOB: 1965    Medical Record Number: [id number]    Admitting Physician: Dr. Andrew Avila    Reason for Admission: End Stage Left Hip OA    History of Present Illness: 53 y.o. male presents with severe end stage hip osteoarthritis which has not been responsive to the full compliment of conservative measures. Despite conservative attempts, there is still severe, constant activity limiting hip pain. Given the severity of the pain, the patient has elected to proceed with hip replacement.    Allergies: No Known Allergies      Current Medications:  Scheduled Meds:  PRN Meds:.    PMH:  Past Medical History:   Diagnosis Date   • Adverse effect of anesthesia     PATIENT STATES WAKES UP ANGRY & COMBATIVE   • Arthritis    • CTS (carpal tunnel syndrome)    • Diverticulosis    • Gout    • Gout    • H/O Right leg pain    • Hiatal hernia    • Hypertension    • IBS (irritable bowel syndrome)    • Osteonecrosis (CMS/HCC)    • Osteoporosis    • Pulmonary emboli (CMS/HCC)         PSURGH:  Past Surgical History:   Procedure Laterality Date   • EXTENSOR TENDON OF FOREARM / WRIST REPAIR Bilateral    • FOOT FRACTURE SURGERY     • FOOT NEUROMA SURGERY Right     Mccabe's neuroma   • HERNIA REPAIR      BILATERAL INGUINAL   • OTHER SURGICAL HISTORY Right     Elbow surgery   • UMBILICAL HERNIA REPAIR N/A 2017    Procedure: UMBILICAL HERNIA REPAIR;  Surgeon: Fernando Montes Jr., MD;  Location: [facility];  Service:        SocialHx:  Social History     Occupational History   • Occupation: Business owner     Comment: [employer]   Tobacco Use   • Smoking status: Former Smoker     Packs/day: 1.50     Years: 20.00     Pack years: 30.00     Types: Cigarettes     Last attempt to quit: 2016     Years since quittin.5   • Smokeless tobacco: Never Used   Substance and Sexual Activity   • Alcohol use: Yes     Alcohol/week: 4.2 oz     Types: 7 Glasses of wine per  "week     Comment: 1 GLASS WINE PER NIGHT   • Drug use: No   • Sexual activity: Yes     Partners: Female      Social History     Social History Narrative   • Not on file       FamHx:  Family History   Problem Relation Age of Onset   • Hypertension Mother    • Arthritis Mother    • Osteoporosis Mother    • Gout Father    • Psoriasis Sister    • Lymphoma Sister    • Hypertension Brother    • Arthritis Maternal Uncle    • Emphysema Maternal Grandfather    • Arthritis Maternal Uncle    • Arthritis Maternal Uncle    • Arthritis Maternal Uncle    • Cancer Paternal Aunt    • Malig Hyperthermia Neg Hx          Review of Systems:   A 14 point review of systems was performed, pertinent positives discussed above, all other systems are negative    Physical Exam: 53 y.o. male  Vital Signs :   Vitals:    03/14/19 1541   BP: 138/82   Temp: 98.6 °F (37 °C)   TempSrc: Temporal   Weight: 108 kg (237 lb)   Height: 190.5 cm (75\")     General: Alert and Oriented x 3, No acute distress.  Psych: mood and affect appropriate; recent and remote memory intact  Eyes: conjunctiva clear; pupils equally round and reactive, sclera nonicteric  CV: no peripheral edema  Resp: normal respiratory effort  Skin: no rashes or wounds; normal turgor  Musculosketetal; pain with hip range of motion. Positive stinchfeld test. No trochanteric  Tenderness.  Vascular: palpable distal pulses    Labs:    Appointment on 03/07/2019   Component Date Value Ref Range Status   • Glucose 03/07/2019 164* 65 - 99 mg/dL Final   • BUN 03/07/2019 13  6 - 20 mg/dL Final   • Creatinine 03/07/2019 0.96  0.76 - 1.27 mg/dL Final   • Sodium 03/07/2019 137  136 - 145 mmol/L Final   • Potassium 03/07/2019 3.9  3.5 - 5.2 mmol/L Final   • Chloride 03/07/2019 98  98 - 107 mmol/L Final   • CO2 03/07/2019 26.1  22.0 - 29.0 mmol/L Final   • Calcium 03/07/2019 9.2  8.6 - 10.5 mg/dL Final   • eGFR Non African Amer 03/07/2019 81  >60 mL/min/1.73 Final   • BUN/Creatinine Ratio 03/07/2019 13.5  " 7.0 - 25.0 Final   • Anion Gap 03/07/2019 12.9  mmol/L Final   • WBC 03/07/2019 7.81  3.40 - 10.80 10*3/mm3 Final   • RBC 03/07/2019 4.93  4.14 - 5.80 10*6/mm3 Final   • Hemoglobin 03/07/2019 16.3  13.0 - 17.7 g/dL Final   • Hematocrit 03/07/2019 48.3  37.5 - 51.0 % Final   • MCV 03/07/2019 98.0* 79.0 - 97.0 fL Final   • MCH 03/07/2019 33.1* 26.6 - 33.0 pg Final   • MCHC 03/07/2019 33.7  31.5 - 35.7 g/dL Final   • RDW 03/07/2019 12.9  12.3 - 15.4 % Final   • RDW-SD 03/07/2019 46.2  37.0 - 54.0 fl Final   • MPV 03/07/2019 9.7  6.0 - 12.0 fL Final   • Platelets 03/07/2019 189  140 - 450 10*3/mm3 Final   • Color, UA 03/07/2019 Yellow  Yellow, Straw Final   • Appearance, UA 03/07/2019 Clear  Clear Final   • pH, UA 03/07/2019 5.5  5.0 - 8.0 Final   • Specific Gravity, UA 03/07/2019 1.018  1.005 - 1.030 Final   • Glucose, UA 03/07/2019 Negative  Negative Final   • Ketones, UA 03/07/2019 Negative  Negative Final   • Bilirubin, UA 03/07/2019 Negative  Negative Final   • Blood, UA 03/07/2019 Negative  Negative Final   • Protein, UA 03/07/2019 Negative  Negative Final   • Leuk Esterase, UA 03/07/2019 Negative  Negative Final   • Nitrite, UA 03/07/2019 Negative  Negative Final   • Urobilinogen, UA 03/07/2019 0.2 E.U./dL  0.2 - 1.0 E.U./dL Final     Xrays:  Xrays AP pelvis and a lateral of the Left hip were ordered and reviewed demonstrating  End stage hip OA with bone on bone articulation, subchondral cysts and periarticular osteophytes.    Assessment:  End-stage Left hip osteoarthritis. Conservative measures have failed.      Plan:  The plan is to proceed with Left Total Hip Replacement. The patient voiced understanding of the risks, benefits, and alternative forms of treatment that were discussed with Dr Avila at the time of scheduling.  Patient is planning on home with home health next day.  He does have a history of DVT with pulmonary emboli so we will treat with Coumadin and Lovenox postoperatively until therapeutic been  on Coumadin for at least 6 weeks.  We will ultrasound both legs before discharge, T acid to be given locally in OR    Haylee Antunez, APRN  3/14/2019

## 2019-03-14 NOTE — H&P
Patient: Zachary Gerard    Date of Admission: 3/13/22/19    YOB: 1965    Medical Record Number: [id number]    Admitting Physician: Dr. Andrew Avila    Reason for Admission: End Stage Left Hip OA    History of Present Illness: 53 y.o. male presents with severe end stage hip osteoarthritis which has not been responsive to the full compliment of conservative measures. Despite conservative attempts, there is still severe, constant activity limiting hip pain. Given the severity of the pain, the patient has elected to proceed with hip replacement.    Allergies: No Known Allergies      Current Medications:  Scheduled Meds:  PRN Meds:.    PMH:  Past Medical History:   Diagnosis Date   • Adverse effect of anesthesia     PATIENT STATES WAKES UP ANGRY & COMBATIVE   • Arthritis    • CTS (carpal tunnel syndrome)    • Diverticulosis    • Gout    • Gout    • H/O Right leg pain    • Hiatal hernia    • Hypertension    • IBS (irritable bowel syndrome)    • Osteonecrosis (CMS/HCC)    • Osteoporosis    • Pulmonary emboli (CMS/HCC)         PSURGH:  Past Surgical History:   Procedure Laterality Date   • EXTENSOR TENDON OF FOREARM / WRIST REPAIR Bilateral    • FOOT FRACTURE SURGERY     • FOOT NEUROMA SURGERY Right     Mccabe's neuroma   • HERNIA REPAIR      BILATERAL INGUINAL   • OTHER SURGICAL HISTORY Right     Elbow surgery   • UMBILICAL HERNIA REPAIR N/A 2017    Procedure: UMBILICAL HERNIA REPAIR;  Surgeon: Fernando Montes Jr., MD;  Location: [facility];  Service:        SocialHx:  Social History     Occupational History   • Occupation: Business owner     Comment: [employer]   Tobacco Use   • Smoking status: Former Smoker     Packs/day: 1.50     Years: 20.00     Pack years: 30.00     Types: Cigarettes     Last attempt to quit: 2016     Years since quittin.5   • Smokeless tobacco: Never Used   Substance and Sexual Activity   • Alcohol use: Yes     Alcohol/week: 4.2 oz     Types: 7 Glasses of wine per  "week     Comment: 1 GLASS WINE PER NIGHT   • Drug use: No   • Sexual activity: Yes     Partners: Female      Social History     Social History Narrative   • Not on file       FamHx:  Family History   Problem Relation Age of Onset   • Hypertension Mother    • Arthritis Mother    • Osteoporosis Mother    • Gout Father    • Psoriasis Sister    • Lymphoma Sister    • Hypertension Brother    • Arthritis Maternal Uncle    • Emphysema Maternal Grandfather    • Arthritis Maternal Uncle    • Arthritis Maternal Uncle    • Arthritis Maternal Uncle    • Cancer Paternal Aunt    • Malig Hyperthermia Neg Hx          Review of Systems:   A 14 point review of systems was performed, pertinent positives discussed above, all other systems are negative    Physical Exam: 53 y.o. male  Vital Signs :   Vitals:    03/14/19 1541   BP: 138/82   Temp: 98.6 °F (37 °C)   TempSrc: Temporal   Weight: 108 kg (237 lb)   Height: 190.5 cm (75\")     General: Alert and Oriented x 3, No acute distress.  Psych: mood and affect appropriate; recent and remote memory intact  Eyes: conjunctiva clear; pupils equally round and reactive, sclera nonicteric  CV: no peripheral edema  Resp: normal respiratory effort  Skin: no rashes or wounds; normal turgor  Musculosketetal; pain with hip range of motion. Positive stinchfeld test. No trochanteric  Tenderness.  Vascular: palpable distal pulses    Labs:    Appointment on 03/07/2019   Component Date Value Ref Range Status   • Glucose 03/07/2019 164* 65 - 99 mg/dL Final   • BUN 03/07/2019 13  6 - 20 mg/dL Final   • Creatinine 03/07/2019 0.96  0.76 - 1.27 mg/dL Final   • Sodium 03/07/2019 137  136 - 145 mmol/L Final   • Potassium 03/07/2019 3.9  3.5 - 5.2 mmol/L Final   • Chloride 03/07/2019 98  98 - 107 mmol/L Final   • CO2 03/07/2019 26.1  22.0 - 29.0 mmol/L Final   • Calcium 03/07/2019 9.2  8.6 - 10.5 mg/dL Final   • eGFR Non African Amer 03/07/2019 81  >60 mL/min/1.73 Final   • BUN/Creatinine Ratio 03/07/2019 13.5  " 7.0 - 25.0 Final   • Anion Gap 03/07/2019 12.9  mmol/L Final   • WBC 03/07/2019 7.81  3.40 - 10.80 10*3/mm3 Final   • RBC 03/07/2019 4.93  4.14 - 5.80 10*6/mm3 Final   • Hemoglobin 03/07/2019 16.3  13.0 - 17.7 g/dL Final   • Hematocrit 03/07/2019 48.3  37.5 - 51.0 % Final   • MCV 03/07/2019 98.0* 79.0 - 97.0 fL Final   • MCH 03/07/2019 33.1* 26.6 - 33.0 pg Final   • MCHC 03/07/2019 33.7  31.5 - 35.7 g/dL Final   • RDW 03/07/2019 12.9  12.3 - 15.4 % Final   • RDW-SD 03/07/2019 46.2  37.0 - 54.0 fl Final   • MPV 03/07/2019 9.7  6.0 - 12.0 fL Final   • Platelets 03/07/2019 189  140 - 450 10*3/mm3 Final   • Color, UA 03/07/2019 Yellow  Yellow, Straw Final   • Appearance, UA 03/07/2019 Clear  Clear Final   • pH, UA 03/07/2019 5.5  5.0 - 8.0 Final   • Specific Gravity, UA 03/07/2019 1.018  1.005 - 1.030 Final   • Glucose, UA 03/07/2019 Negative  Negative Final   • Ketones, UA 03/07/2019 Negative  Negative Final   • Bilirubin, UA 03/07/2019 Negative  Negative Final   • Blood, UA 03/07/2019 Negative  Negative Final   • Protein, UA 03/07/2019 Negative  Negative Final   • Leuk Esterase, UA 03/07/2019 Negative  Negative Final   • Nitrite, UA 03/07/2019 Negative  Negative Final   • Urobilinogen, UA 03/07/2019 0.2 E.U./dL  0.2 - 1.0 E.U./dL Final     Xrays:  Xrays AP pelvis and a lateral of the Left hip were ordered and reviewed demonstrating  End stage hip OA with bone on bone articulation, subchondral cysts and periarticular osteophytes.    Assessment:  End-stage Left hip osteoarthritis. Conservative measures have failed.      Plan:  The plan is to proceed with Left Total Hip Replacement. The patient voiced understanding of the risks, benefits, and alternative forms of treatment that were discussed with Dr Avila at the time of scheduling.  Patient is planning on home with home health next day.  He does have a history of DVT with pulmonary emboli so we will treat with Coumadin and Lovenox postoperatively until therapeutic been  on Coumadin for at least 6 weeks.  We will ultrasound both legs before discharge, T acid to be given locally in OR    Haylee Antunez, APRN  3/14/2019

## 2019-03-14 NOTE — PROGRESS NOTES
Pre-Operative Orthopedic Assessment      Patient: Zachary Gerard    YOB: 1965      Age/Gender: 53 y.o. male  Medical Record Number: [id number]  Surgical Procedure Planned: TOTAL HIP ARTHROPLASTY     Surgeon: Andrew Avila MD    Date of Surgery Planned: 3/22/19    Question Value Score    Patient Score   1. What is your age group? 50-65 years  66-75 years  >75 years =2  =1  =0 2   2. Gender? Male  Female =2  =1 2   3. How far on average can you walk? (a block is 200 meters) Two blocks or more (+\- rest)  1-2 blocks (+\- rest)  Housebound (most of time) =2  =1  =0 2   4. Which gait aid to you use? (more often than not) None  Single-Point Stick  Crutches/Frame =2  =1  =0 2   5. Do you use community  supports? (home help, meals on wheels, district nursing) None or one per week  Two or more per week =1  =0 1   6. Will you live with someone who can care for you after your operation? Yes  No =3  =0 3      Your Score (out of 12)  12     Key Destination at Discharge from acute care predicted by score   Scores < 6  -- extended inpatient rehabilitation   Scores 6-9 -- additional intervention to discharge directly home (Rehabilitation in home)   Scores > 9 -- directly home         Discharge Disposition/Planning:     Patient Response   Discussed the Predicted discharge disposition needed based on RAPT Assessment with the patient.    Y   Patient selected discharge disposition:   HOME   Out Patient Rehabilitation Facility of Choice:    NO CHOICE YET   Home Health Services Preferred:   NO CHOICE YET   Post-Operative Care Giver Name: WIFE LUIS DANIEL GARCIA# [phone]     Subacute Inpatient Rehabilitation:  Complete this section only if planning inpatient services at a Subacute Facility     Patient Response   Subacute Facility Preferred (Please list 2 facilities:      Requires pre-certification for inpatient rehabilitation services?         Planned source of transportation to inpatient rehabilitation  facility?       If choosing inpatient services at an Acute or Subacute Facility please list a subsequent back-up plan (in case patient fails to qualify for inpatient rehabilitation). Back-up plans should include caregiver (family member or friend) for first 24-48 post- -operatively.       Home Equipment Patient Response   Does patient have a walker for home use?    N   Does patient have a 3 in 1 commode for home use?    N   Does patient have a shower chair for home use?    N   Does patient have an elevated commode seat for home use? N   Does patient have a cane for home use?    Y   Is there any other medical equipment in the home? If so,  List in comment section below CRUTCHES   Pre-Operative Class Attendance Patient Response   Attended or scheduled to attend the pre-operative class within 1 year of total joint replacement? Y   Patient Education  Completed   Expected time of discharge discussed Y   Encouraged to attend Pre-Operative Class    Y   Education re: Predicted Discharge Disposition based on RAPT score    Y   Patient receptive and voiced understanding of information given    Y                                                                                                            Comments:    4 STEPS INTO HOUSE NO RAIL.   13 STEPS TO 2ND FLOOR BR W/RAIL BUT WILL LIKELY SLEEP ON THE 1 ST FLOOR.      HAS A CANE AND CRUTCHES ONLY.      HAS NO CHOICE FOR HH/PT OR OUTPT REHAB. PATIENT LIVES IN [place], [place] AND SAY HE DOES KNOW WHAT IF ANYTHING IS AVAILABLE DOWN THERE.   3/14/19 NOTIFIED JUDITH OF THIS ON HER V/M                                       Signature: Mihir Kelly LPN    Date:  3/14/2019

## 2019-03-22 ENCOUNTER — HOSPITAL ENCOUNTER (INPATIENT)
Facility: HOSPITAL | Age: 54
LOS: 1 days | Discharge: HOME | End: 2019-03-23
Attending: ORTHOPAEDIC SURGERY | Admitting: ORTHOPAEDIC SURGERY

## 2019-03-22 ENCOUNTER — ANESTHESIA (OUTPATIENT)
Dept: PERIOP | Facility: HOSPITAL | Age: 54
End: 2019-03-22

## 2019-03-22 ENCOUNTER — APPOINTMENT (OUTPATIENT)
Dept: CARDIOLOGY | Facility: HOSPITAL | Age: 54
End: 2019-03-22

## 2019-03-22 ENCOUNTER — ANESTHESIA EVENT (OUTPATIENT)
Dept: PERIOP | Facility: HOSPITAL | Age: 54
End: 2019-03-22

## 2019-03-22 ENCOUNTER — APPOINTMENT (OUTPATIENT)
Dept: GENERAL RADIOLOGY | Facility: HOSPITAL | Age: 54
End: 2019-03-22

## 2019-03-22 DIAGNOSIS — R26.2 DIFFICULTY WALKING: Primary | ICD-10-CM

## 2019-03-22 DIAGNOSIS — M16.12 PRIMARY OSTEOARTHRITIS OF LEFT HIP: ICD-10-CM

## 2019-03-22 PROBLEM — M16.9 OA (OSTEOARTHRITIS) OF HIP: Status: ACTIVE | Noted: 2019-03-22

## 2019-03-22 LAB
ABO GROUP BLD: NORMAL
BH CV LOWER VASCULAR LEFT COMMON FEMORAL AUGMENT: NORMAL
BH CV LOWER VASCULAR LEFT COMMON FEMORAL COMPETENT: NORMAL
BH CV LOWER VASCULAR LEFT COMMON FEMORAL COMPRESS: NORMAL
BH CV LOWER VASCULAR LEFT COMMON FEMORAL PHASIC: NORMAL
BH CV LOWER VASCULAR LEFT COMMON FEMORAL SPONT: NORMAL
BH CV LOWER VASCULAR LEFT DISTAL FEMORAL COMPRESS: NORMAL
BH CV LOWER VASCULAR LEFT GASTRONEMIUS COMPRESS: NORMAL
BH CV LOWER VASCULAR LEFT GREATER SAPH AK COMPRESS: NORMAL
BH CV LOWER VASCULAR LEFT GREATER SAPH BK COMPRESS: NORMAL
BH CV LOWER VASCULAR LEFT MID FEMORAL AUGMENT: NORMAL
BH CV LOWER VASCULAR LEFT MID FEMORAL COMPETENT: NORMAL
BH CV LOWER VASCULAR LEFT MID FEMORAL COMPRESS: NORMAL
BH CV LOWER VASCULAR LEFT MID FEMORAL PHASIC: NORMAL
BH CV LOWER VASCULAR LEFT MID FEMORAL SPONT: NORMAL
BH CV LOWER VASCULAR LEFT PERONEAL COMPRESS: NORMAL
BH CV LOWER VASCULAR LEFT POPLITEAL AUGMENT: NORMAL
BH CV LOWER VASCULAR LEFT POPLITEAL COMPETENT: NORMAL
BH CV LOWER VASCULAR LEFT POPLITEAL COMPRESS: NORMAL
BH CV LOWER VASCULAR LEFT POPLITEAL PHASIC: NORMAL
BH CV LOWER VASCULAR LEFT POPLITEAL SPONT: NORMAL
BH CV LOWER VASCULAR LEFT POSTERIOR TIBIAL COMPRESS: NORMAL
BH CV LOWER VASCULAR LEFT PROXIMAL FEMORAL COMPRESS: NORMAL
BH CV LOWER VASCULAR LEFT SAPHENOFEMORAL JUNCTION AUGMENT: NORMAL
BH CV LOWER VASCULAR LEFT SAPHENOFEMORAL JUNCTION COMPETENT: NORMAL
BH CV LOWER VASCULAR LEFT SAPHENOFEMORAL JUNCTION COMPRESS: NORMAL
BH CV LOWER VASCULAR LEFT SAPHENOFEMORAL JUNCTION PHASIC: NORMAL
BH CV LOWER VASCULAR LEFT SAPHENOFEMORAL JUNCTION SPONT: NORMAL
BH CV LOWER VASCULAR RIGHT COMMON FEMORAL AUGMENT: NORMAL
BH CV LOWER VASCULAR RIGHT COMMON FEMORAL COMPETENT: NORMAL
BH CV LOWER VASCULAR RIGHT COMMON FEMORAL COMPRESS: NORMAL
BH CV LOWER VASCULAR RIGHT COMMON FEMORAL PHASIC: NORMAL
BH CV LOWER VASCULAR RIGHT COMMON FEMORAL SPONT: NORMAL
BH CV LOWER VASCULAR RIGHT DISTAL FEMORAL COMPRESS: NORMAL
BH CV LOWER VASCULAR RIGHT GASTRONEMIUS COMPRESS: NORMAL
BH CV LOWER VASCULAR RIGHT GREATER SAPH AK COMPRESS: NORMAL
BH CV LOWER VASCULAR RIGHT GREATER SAPH BK COMPRESS: NORMAL
BH CV LOWER VASCULAR RIGHT MID FEMORAL AUGMENT: NORMAL
BH CV LOWER VASCULAR RIGHT MID FEMORAL COMPETENT: NORMAL
BH CV LOWER VASCULAR RIGHT MID FEMORAL COMPRESS: NORMAL
BH CV LOWER VASCULAR RIGHT MID FEMORAL PHASIC: NORMAL
BH CV LOWER VASCULAR RIGHT MID FEMORAL SPONT: NORMAL
BH CV LOWER VASCULAR RIGHT PERONEAL COMPRESS: NORMAL
BH CV LOWER VASCULAR RIGHT POPLITEAL AUGMENT: NORMAL
BH CV LOWER VASCULAR RIGHT POPLITEAL COMPETENT: NORMAL
BH CV LOWER VASCULAR RIGHT POPLITEAL COMPRESS: NORMAL
BH CV LOWER VASCULAR RIGHT POPLITEAL PHASIC: NORMAL
BH CV LOWER VASCULAR RIGHT POPLITEAL SPONT: NORMAL
BH CV LOWER VASCULAR RIGHT POSTERIOR TIBIAL COMPRESS: NORMAL
BH CV LOWER VASCULAR RIGHT PROXIMAL FEMORAL COMPRESS: NORMAL
BH CV LOWER VASCULAR RIGHT SAPHENOFEMORAL JUNCTION AUGMENT: NORMAL
BH CV LOWER VASCULAR RIGHT SAPHENOFEMORAL JUNCTION COMPETENT: NORMAL
BH CV LOWER VASCULAR RIGHT SAPHENOFEMORAL JUNCTION COMPRESS: NORMAL
BH CV LOWER VASCULAR RIGHT SAPHENOFEMORAL JUNCTION PHASIC: NORMAL
BH CV LOWER VASCULAR RIGHT SAPHENOFEMORAL JUNCTION SPONT: NORMAL
BLD GP AB SCN SERPL QL: NEGATIVE
INR PPP: 1.04 (ref 0.9–1.1)
PROTHROMBIN TIME: 13.4 SECONDS (ref 11.7–14.2)
RH BLD: POSITIVE
T&S EXPIRATION DATE: NORMAL

## 2019-03-22 PROCEDURE — 97161 PT EVAL LOW COMPLEX 20 MIN: CPT

## 2019-03-22 PROCEDURE — 25010000002 SUCCINYLCHOLINE PER 20 MG: Performed by: NURSE ANESTHETIST, CERTIFIED REGISTERED

## 2019-03-22 PROCEDURE — 25010000002 DEXAMETHASONE PER 1 MG: Performed by: NURSE ANESTHETIST, CERTIFIED REGISTERED

## 2019-03-22 PROCEDURE — 25010000002 MIDAZOLAM PER 1 MG: Performed by: ANESTHESIOLOGY

## 2019-03-22 PROCEDURE — 86850 RBC ANTIBODY SCREEN: CPT | Performed by: ORTHOPAEDIC SURGERY

## 2019-03-22 PROCEDURE — 93970 EXTREMITY STUDY: CPT

## 2019-03-22 PROCEDURE — 97110 THERAPEUTIC EXERCISES: CPT

## 2019-03-22 PROCEDURE — 25010000003 BUPIVACAINE LIPOSOME 1.3 % SUSPENSION 20 ML VIAL: Performed by: ORTHOPAEDIC SURGERY

## 2019-03-22 PROCEDURE — 25010000002 VANCOMYCIN 10 G RECONSTITUTED SOLUTION: Performed by: ORTHOPAEDIC SURGERY

## 2019-03-22 PROCEDURE — 25010000003 CEFAZOLIN IN DEXTROSE 2-4 GM/100ML-% SOLUTION: Performed by: NURSE PRACTITIONER

## 2019-03-22 PROCEDURE — 85610 PROTHROMBIN TIME: CPT | Performed by: ORTHOPAEDIC SURGERY

## 2019-03-22 PROCEDURE — 27130 TOTAL HIP ARTHROPLASTY: CPT | Performed by: NURSE PRACTITIONER

## 2019-03-22 PROCEDURE — 25010000002 FENTANYL CITRATE (PF) 100 MCG/2ML SOLUTION: Performed by: ANESTHESIOLOGY

## 2019-03-22 PROCEDURE — 25010000002 ENOXAPARIN PER 10 MG: Performed by: NURSE PRACTITIONER

## 2019-03-22 PROCEDURE — 27130 TOTAL HIP ARTHROPLASTY: CPT | Performed by: ORTHOPAEDIC SURGERY

## 2019-03-22 PROCEDURE — C1776 JOINT DEVICE (IMPLANTABLE): HCPCS | Performed by: ORTHOPAEDIC SURGERY

## 2019-03-22 PROCEDURE — 25010000002 PROPOFOL 10 MG/ML EMULSION: Performed by: NURSE ANESTHETIST, CERTIFIED REGISTERED

## 2019-03-22 PROCEDURE — 86900 BLOOD TYPING SEROLOGIC ABO: CPT | Performed by: ORTHOPAEDIC SURGERY

## 2019-03-22 PROCEDURE — 25010000002 FENTANYL CITRATE (PF) 100 MCG/2ML SOLUTION: Performed by: NURSE ANESTHETIST, CERTIFIED REGISTERED

## 2019-03-22 PROCEDURE — 25010000003 CEFAZOLIN IN DEXTROSE 2-4 GM/100ML-% SOLUTION: Performed by: ORTHOPAEDIC SURGERY

## 2019-03-22 PROCEDURE — 86901 BLOOD TYPING SEROLOGIC RH(D): CPT | Performed by: ORTHOPAEDIC SURGERY

## 2019-03-22 PROCEDURE — 25010000002 PHENYLEPHRINE PER 1 ML: Performed by: NURSE ANESTHETIST, CERTIFIED REGISTERED

## 2019-03-22 PROCEDURE — 25010000002 NEOSTIGMINE PER 0.5 MG: Performed by: NURSE ANESTHETIST, CERTIFIED REGISTERED

## 2019-03-22 PROCEDURE — C9290 INJ, BUPIVACAINE LIPOSOME: HCPCS | Performed by: ORTHOPAEDIC SURGERY

## 2019-03-22 PROCEDURE — 0SRB0JA REPLACEMENT OF LEFT HIP JOINT WITH SYNTHETIC SUBSTITUTE, UNCEMENTED, OPEN APPROACH: ICD-10-PCS | Performed by: ORTHOPAEDIC SURGERY

## 2019-03-22 PROCEDURE — 25010000002 HYDROMORPHONE PER 4 MG: Performed by: NURSE ANESTHETIST, CERTIFIED REGISTERED

## 2019-03-22 PROCEDURE — 73501 X-RAY EXAM HIP UNI 1 VIEW: CPT

## 2019-03-22 DEVICE — R3 3 HOLE ACETABULAR SHELL 58MM
Type: IMPLANTABLE DEVICE | Site: HIP | Status: FUNCTIONAL
Brand: R3 ACETABULAR

## 2019-03-22 DEVICE — IMPLANTABLE DEVICE: Type: IMPLANTABLE DEVICE | Site: HIP | Status: FUNCTIONAL

## 2019-03-22 DEVICE — OXINIUM FEMORAL HEAD 12/14 TAPER                                    36 MM M/+4
Type: IMPLANTABLE DEVICE | Site: HIP | Status: FUNCTIONAL
Brand: OXINIUM

## 2019-03-22 DEVICE — REFLECTION SPHERICAL HEAD SCREW 20MM
Type: IMPLANTABLE DEVICE | Site: HIP | Status: FUNCTIONAL
Brand: REFLECTION

## 2019-03-22 DEVICE — REFLECTION SPHERICAL HEAD SCREW 30MM
Type: IMPLANTABLE DEVICE | Site: HIP | Status: FUNCTIONAL
Brand: REFLECTION

## 2019-03-22 DEVICE — POLARSTEM STANDARD NON-CEMENTED                                    WITH TI/HA 5
Type: IMPLANTABLE DEVICE | Site: HIP | Status: FUNCTIONAL
Brand: POLARSTEM

## 2019-03-22 DEVICE — R3 20 DEGREE XLPE ACETABULAR LINER                                    36MM INNER DIAMETER X OUTER DIAMETER 58MM
Type: IMPLANTABLE DEVICE | Site: HIP | Status: FUNCTIONAL
Brand: R3

## 2019-03-22 RX ORDER — SUCCINYLCHOLINE CHLORIDE 20 MG/ML
INJECTION INTRAMUSCULAR; INTRAVENOUS AS NEEDED
Status: DISCONTINUED | OUTPATIENT
Start: 2019-03-22 | End: 2019-03-22 | Stop reason: SURG

## 2019-03-22 RX ORDER — MEPERIDINE HYDROCHLORIDE 25 MG/ML
12.5 INJECTION INTRAMUSCULAR; INTRAVENOUS; SUBCUTANEOUS
Status: DISCONTINUED | OUTPATIENT
Start: 2019-03-22 | End: 2019-03-22 | Stop reason: HOSPADM

## 2019-03-22 RX ORDER — ENALAPRIL MALEATE 5 MG/1
5 TABLET ORAL DAILY
Status: DISCONTINUED | OUTPATIENT
Start: 2019-03-22 | End: 2019-03-23 | Stop reason: HOSPADM

## 2019-03-22 RX ORDER — PANTOPRAZOLE SODIUM 40 MG/1
40 TABLET, DELAYED RELEASE ORAL EVERY MORNING
Status: DISCONTINUED | OUTPATIENT
Start: 2019-03-23 | End: 2019-03-23 | Stop reason: HOSPADM

## 2019-03-22 RX ORDER — FENTANYL CITRATE 50 UG/ML
50 INJECTION, SOLUTION INTRAMUSCULAR; INTRAVENOUS
Status: DISCONTINUED | OUTPATIENT
Start: 2019-03-22 | End: 2019-03-22 | Stop reason: HOSPADM

## 2019-03-22 RX ORDER — GLYCOPYRROLATE 0.2 MG/ML
INJECTION INTRAMUSCULAR; INTRAVENOUS AS NEEDED
Status: DISCONTINUED | OUTPATIENT
Start: 2019-03-22 | End: 2019-03-22 | Stop reason: SURG

## 2019-03-22 RX ORDER — ONDANSETRON 2 MG/ML
4 INJECTION INTRAMUSCULAR; INTRAVENOUS ONCE AS NEEDED
Status: DISCONTINUED | OUTPATIENT
Start: 2019-03-22 | End: 2019-03-22 | Stop reason: HOSPADM

## 2019-03-22 RX ORDER — PROPOFOL 10 MG/ML
VIAL (ML) INTRAVENOUS AS NEEDED
Status: DISCONTINUED | OUTPATIENT
Start: 2019-03-22 | End: 2019-03-22 | Stop reason: SURG

## 2019-03-22 RX ORDER — PROMETHAZINE HYDROCHLORIDE 25 MG/ML
12.5 INJECTION, SOLUTION INTRAMUSCULAR; INTRAVENOUS ONCE AS NEEDED
Status: DISCONTINUED | OUTPATIENT
Start: 2019-03-22 | End: 2019-03-22 | Stop reason: HOSPADM

## 2019-03-22 RX ORDER — MIDAZOLAM HYDROCHLORIDE 1 MG/ML
1 INJECTION INTRAMUSCULAR; INTRAVENOUS
Status: DISCONTINUED | OUTPATIENT
Start: 2019-03-22 | End: 2019-03-22 | Stop reason: HOSPADM

## 2019-03-22 RX ORDER — NALOXONE HCL 0.4 MG/ML
0.2 VIAL (ML) INJECTION AS NEEDED
Status: DISCONTINUED | OUTPATIENT
Start: 2019-03-22 | End: 2019-03-22 | Stop reason: HOSPADM

## 2019-03-22 RX ORDER — PROMETHAZINE HYDROCHLORIDE 25 MG/1
25 SUPPOSITORY RECTAL ONCE AS NEEDED
Status: DISCONTINUED | OUTPATIENT
Start: 2019-03-22 | End: 2019-03-22 | Stop reason: HOSPADM

## 2019-03-22 RX ORDER — HYDROCODONE BITARTRATE AND ACETAMINOPHEN 7.5; 325 MG/1; MG/1
2 TABLET ORAL EVERY 4 HOURS PRN
Status: DISCONTINUED | OUTPATIENT
Start: 2019-03-22 | End: 2019-03-23 | Stop reason: HOSPADM

## 2019-03-22 RX ORDER — CEFAZOLIN SODIUM 2 G/100ML
2 INJECTION, SOLUTION INTRAVENOUS EVERY 8 HOURS
Status: COMPLETED | OUTPATIENT
Start: 2019-03-22 | End: 2019-03-23

## 2019-03-22 RX ORDER — PROMETHAZINE HYDROCHLORIDE 25 MG/1
25 TABLET ORAL ONCE AS NEEDED
Status: DISCONTINUED | OUTPATIENT
Start: 2019-03-22 | End: 2019-03-22 | Stop reason: HOSPADM

## 2019-03-22 RX ORDER — FENTANYL CITRATE 50 UG/ML
INJECTION, SOLUTION INTRAMUSCULAR; INTRAVENOUS AS NEEDED
Status: DISCONTINUED | OUTPATIENT
Start: 2019-03-22 | End: 2019-03-22 | Stop reason: SURG

## 2019-03-22 RX ORDER — UREA 10 %
3 LOTION (ML) TOPICAL NIGHTLY PRN
Status: DISCONTINUED | OUTPATIENT
Start: 2019-03-22 | End: 2019-03-23 | Stop reason: HOSPADM

## 2019-03-22 RX ORDER — ROCURONIUM BROMIDE 10 MG/ML
INJECTION, SOLUTION INTRAVENOUS AS NEEDED
Status: DISCONTINUED | OUTPATIENT
Start: 2019-03-22 | End: 2019-03-22 | Stop reason: SURG

## 2019-03-22 RX ORDER — WOUND DRESSING ADHESIVE - LIQUID
LIQUID MISCELLANEOUS AS NEEDED
Status: DISCONTINUED | OUTPATIENT
Start: 2019-03-22 | End: 2019-03-22 | Stop reason: HOSPADM

## 2019-03-22 RX ORDER — ONDANSETRON 4 MG/1
4 TABLET, FILM COATED ORAL EVERY 6 HOURS PRN
Status: DISCONTINUED | OUTPATIENT
Start: 2019-03-22 | End: 2019-03-23 | Stop reason: HOSPADM

## 2019-03-22 RX ORDER — SODIUM CHLORIDE 0.9 % (FLUSH) 0.9 %
1-10 SYRINGE (ML) INJECTION AS NEEDED
Status: DISCONTINUED | OUTPATIENT
Start: 2019-03-22 | End: 2019-03-22 | Stop reason: HOSPADM

## 2019-03-22 RX ORDER — ONDANSETRON 2 MG/ML
4 INJECTION INTRAMUSCULAR; INTRAVENOUS EVERY 6 HOURS PRN
Status: DISCONTINUED | OUTPATIENT
Start: 2019-03-22 | End: 2019-03-23 | Stop reason: HOSPADM

## 2019-03-22 RX ORDER — PSEUDOEPHEDRINE HCL 30 MG
100 TABLET ORAL 2 TIMES DAILY
Qty: 28 EACH | Refills: 0 | Status: SHIPPED | OUTPATIENT
Start: 2019-03-22 | End: 2019-04-05

## 2019-03-22 RX ORDER — DIPHENHYDRAMINE HCL 25 MG
25 CAPSULE ORAL
Status: DISCONTINUED | OUTPATIENT
Start: 2019-03-22 | End: 2019-03-22 | Stop reason: HOSPADM

## 2019-03-22 RX ORDER — MELOXICAM 15 MG/1
15 TABLET ORAL ONCE
Status: COMPLETED | OUTPATIENT
Start: 2019-03-22 | End: 2019-03-22

## 2019-03-22 RX ORDER — FAMOTIDINE 10 MG/ML
20 INJECTION, SOLUTION INTRAVENOUS ONCE
Status: COMPLETED | OUTPATIENT
Start: 2019-03-22 | End: 2019-03-22

## 2019-03-22 RX ORDER — ALBUTEROL SULFATE 2.5 MG/3ML
2.5 SOLUTION RESPIRATORY (INHALATION) ONCE AS NEEDED
Status: DISCONTINUED | OUTPATIENT
Start: 2019-03-22 | End: 2019-03-22 | Stop reason: HOSPADM

## 2019-03-22 RX ORDER — DOCUSATE SODIUM 100 MG/1
100 CAPSULE, LIQUID FILLED ORAL 2 TIMES DAILY
Status: DISCONTINUED | OUTPATIENT
Start: 2019-03-22 | End: 2019-03-23 | Stop reason: HOSPADM

## 2019-03-22 RX ORDER — HYDROCODONE BITARTRATE AND ACETAMINOPHEN 7.5; 325 MG/1; MG/1
1-2 TABLET ORAL EVERY 4 HOURS PRN
Qty: 75 TABLET | Refills: 0 | Status: SHIPPED | OUTPATIENT
Start: 2019-03-22 | End: 2019-04-18

## 2019-03-22 RX ORDER — GABAPENTIN 100 MG/1
200 CAPSULE ORAL EVERY 8 HOURS SCHEDULED
Status: DISCONTINUED | OUTPATIENT
Start: 2019-03-22 | End: 2019-03-23 | Stop reason: HOSPADM

## 2019-03-22 RX ORDER — ACETAMINOPHEN 325 MG/1
650 TABLET ORAL ONCE AS NEEDED
Status: DISCONTINUED | OUTPATIENT
Start: 2019-03-22 | End: 2019-03-22 | Stop reason: HOSPADM

## 2019-03-22 RX ORDER — ACETAMINOPHEN 10 MG/ML
1000 INJECTION, SOLUTION INTRAVENOUS ONCE
Status: COMPLETED | OUTPATIENT
Start: 2019-03-22 | End: 2019-03-22

## 2019-03-22 RX ORDER — HYDROMORPHONE HYDROCHLORIDE 1 MG/ML
0.5 INJECTION, SOLUTION INTRAMUSCULAR; INTRAVENOUS; SUBCUTANEOUS
Status: DISCONTINUED | OUTPATIENT
Start: 2019-03-22 | End: 2019-03-22 | Stop reason: HOSPADM

## 2019-03-22 RX ORDER — WARFARIN SODIUM 4 MG/1
4 TABLET ORAL
Status: DISCONTINUED | OUTPATIENT
Start: 2019-03-23 | End: 2019-03-22

## 2019-03-22 RX ORDER — CEFAZOLIN SODIUM 2 G/100ML
2 INJECTION, SOLUTION INTRAVENOUS EVERY 8 HOURS
Status: DISCONTINUED | OUTPATIENT
Start: 2019-03-22 | End: 2019-03-22 | Stop reason: SDUPTHER

## 2019-03-22 RX ORDER — HYDROCODONE BITARTRATE AND ACETAMINOPHEN 7.5; 325 MG/1; MG/1
1 TABLET ORAL ONCE AS NEEDED
Status: DISCONTINUED | OUTPATIENT
Start: 2019-03-22 | End: 2019-03-22 | Stop reason: HOSPADM

## 2019-03-22 RX ORDER — MIDAZOLAM HYDROCHLORIDE 1 MG/ML
2 INJECTION INTRAMUSCULAR; INTRAVENOUS
Status: DISCONTINUED | OUTPATIENT
Start: 2019-03-22 | End: 2019-03-22 | Stop reason: HOSPADM

## 2019-03-22 RX ORDER — FLUMAZENIL 0.1 MG/ML
0.2 INJECTION INTRAVENOUS AS NEEDED
Status: DISCONTINUED | OUTPATIENT
Start: 2019-03-22 | End: 2019-03-22 | Stop reason: HOSPADM

## 2019-03-22 RX ORDER — EPHEDRINE SULFATE 50 MG/ML
5 INJECTION, SOLUTION INTRAVENOUS ONCE AS NEEDED
Status: DISCONTINUED | OUTPATIENT
Start: 2019-03-22 | End: 2019-03-22 | Stop reason: HOSPADM

## 2019-03-22 RX ORDER — HYDROMORPHONE HCL 110MG/55ML
PATIENT CONTROLLED ANALGESIA SYRINGE INTRAVENOUS AS NEEDED
Status: DISCONTINUED | OUTPATIENT
Start: 2019-03-22 | End: 2019-03-22 | Stop reason: SURG

## 2019-03-22 RX ORDER — CEFAZOLIN SODIUM 2 G/100ML
2 INJECTION, SOLUTION INTRAVENOUS EVERY 8 HOURS
Status: DISCONTINUED | OUTPATIENT
Start: 2019-03-22 | End: 2019-03-22

## 2019-03-22 RX ORDER — ONDANSETRON 4 MG/1
4 TABLET, ORALLY DISINTEGRATING ORAL EVERY 6 HOURS PRN
Status: DISCONTINUED | OUTPATIENT
Start: 2019-03-22 | End: 2019-03-23 | Stop reason: HOSPADM

## 2019-03-22 RX ORDER — MAGNESIUM HYDROXIDE 1200 MG/15ML
LIQUID ORAL AS NEEDED
Status: DISCONTINUED | OUTPATIENT
Start: 2019-03-22 | End: 2019-03-22 | Stop reason: HOSPADM

## 2019-03-22 RX ORDER — HYDROCODONE BITARTRATE AND ACETAMINOPHEN 7.5; 325 MG/1; MG/1
1 TABLET ORAL EVERY 4 HOURS PRN
Status: DISCONTINUED | OUTPATIENT
Start: 2019-03-22 | End: 2019-03-23 | Stop reason: HOSPADM

## 2019-03-22 RX ORDER — DEXAMETHASONE SODIUM PHOSPHATE 10 MG/ML
INJECTION INTRAMUSCULAR; INTRAVENOUS AS NEEDED
Status: DISCONTINUED | OUTPATIENT
Start: 2019-03-22 | End: 2019-03-22 | Stop reason: SURG

## 2019-03-22 RX ORDER — LIDOCAINE HYDROCHLORIDE 10 MG/ML
0.5 INJECTION, SOLUTION EPIDURAL; INFILTRATION; INTRACAUDAL; PERINEURAL ONCE AS NEEDED
Status: DISCONTINUED | OUTPATIENT
Start: 2019-03-22 | End: 2019-03-22 | Stop reason: HOSPADM

## 2019-03-22 RX ORDER — TRANEXAMIC ACID 100 MG/ML
INJECTION, SOLUTION INTRAVENOUS AS NEEDED
Status: DISCONTINUED | OUTPATIENT
Start: 2019-03-22 | End: 2019-03-22 | Stop reason: SURG

## 2019-03-22 RX ORDER — PANTOPRAZOLE SODIUM 40 MG/1
40 TABLET, DELAYED RELEASE ORAL EVERY MORNING
Qty: 14 TABLET | Refills: 0 | Status: SHIPPED | OUTPATIENT
Start: 2019-03-23 | End: 2019-04-06

## 2019-03-22 RX ORDER — SODIUM CHLORIDE, SODIUM LACTATE, POTASSIUM CHLORIDE, CALCIUM CHLORIDE 600; 310; 30; 20 MG/100ML; MG/100ML; MG/100ML; MG/100ML
9 INJECTION, SOLUTION INTRAVENOUS CONTINUOUS
Status: DISCONTINUED | OUTPATIENT
Start: 2019-03-22 | End: 2019-03-22 | Stop reason: HOSPADM

## 2019-03-22 RX ORDER — LIDOCAINE HYDROCHLORIDE 20 MG/ML
INJECTION, SOLUTION INFILTRATION; PERINEURAL AS NEEDED
Status: DISCONTINUED | OUTPATIENT
Start: 2019-03-22 | End: 2019-03-22 | Stop reason: SURG

## 2019-03-22 RX ORDER — PREGABALIN 75 MG/1
150 CAPSULE ORAL ONCE
Status: COMPLETED | OUTPATIENT
Start: 2019-03-22 | End: 2019-03-22

## 2019-03-22 RX ORDER — WARFARIN SODIUM 6 MG/1
6 TABLET ORAL
Status: DISCONTINUED | OUTPATIENT
Start: 2019-03-22 | End: 2019-03-22

## 2019-03-22 RX ORDER — CEFAZOLIN SODIUM 2 G/100ML
2 INJECTION, SOLUTION INTRAVENOUS ONCE
Status: COMPLETED | OUTPATIENT
Start: 2019-03-22 | End: 2019-03-22

## 2019-03-22 RX ORDER — OXYCODONE AND ACETAMINOPHEN 7.5; 325 MG/1; MG/1
1 TABLET ORAL ONCE AS NEEDED
Status: DISCONTINUED | OUTPATIENT
Start: 2019-03-22 | End: 2019-03-22 | Stop reason: HOSPADM

## 2019-03-22 RX ADMIN — TRANEXAMIC ACID 1000 MG: 100 INJECTION, SOLUTION INTRAVENOUS at 10:52

## 2019-03-22 RX ADMIN — MIDAZOLAM 2 MG: 1 INJECTION INTRAMUSCULAR; INTRAVENOUS at 08:41

## 2019-03-22 RX ADMIN — PHENYLEPHRINE HYDROCHLORIDE 150 MCG: 10 INJECTION INTRAVENOUS at 10:52

## 2019-03-22 RX ADMIN — FAMOTIDINE 20 MG: 10 INJECTION INTRAVENOUS at 08:40

## 2019-03-22 RX ADMIN — NEOSTIGMINE METHYLSULFATE 2 MG: 1 INJECTION INTRAMUSCULAR; INTRAVENOUS; SUBCUTANEOUS at 11:10

## 2019-03-22 RX ADMIN — HYDROMORPHONE HYDROCHLORIDE 0.5 MG: 2 INJECTION INTRAMUSCULAR; INTRAVENOUS; SUBCUTANEOUS at 10:43

## 2019-03-22 RX ADMIN — ROCURONIUM BROMIDE 45 MG: 10 INJECTION INTRAVENOUS at 09:38

## 2019-03-22 RX ADMIN — ROCURONIUM BROMIDE 5 MG: 10 INJECTION INTRAVENOUS at 09:27

## 2019-03-22 RX ADMIN — TRANEXAMIC ACID 1000 MG: 100 INJECTION, SOLUTION INTRAVENOUS at 09:50

## 2019-03-22 RX ADMIN — SUCCINYLCHOLINE CHLORIDE 100 MG: 20 INJECTION, SOLUTION INTRAMUSCULAR; INTRAVENOUS; PARENTERAL at 09:27

## 2019-03-22 RX ADMIN — GABAPENTIN 200 MG: 100 CAPSULE ORAL at 14:36

## 2019-03-22 RX ADMIN — PREGABALIN 150 MG: 75 CAPSULE ORAL at 08:22

## 2019-03-22 RX ADMIN — GLYCOPYRROLATE 0.3 MG: 0.2 INJECTION INTRAMUSCULAR; INTRAVENOUS at 11:10

## 2019-03-22 RX ADMIN — FENTANYL CITRATE 50 MCG: 50 INJECTION INTRAMUSCULAR; INTRAVENOUS at 09:25

## 2019-03-22 RX ADMIN — SODIUM CHLORIDE, POTASSIUM CHLORIDE, SODIUM LACTATE AND CALCIUM CHLORIDE 500 ML: 600; 310; 30; 20 INJECTION, SOLUTION INTRAVENOUS at 08:21

## 2019-03-22 RX ADMIN — GABAPENTIN 200 MG: 100 CAPSULE ORAL at 21:19

## 2019-03-22 RX ADMIN — ACETAMINOPHEN 1000 MG: 10 INJECTION, SOLUTION INTRAVENOUS at 09:35

## 2019-03-22 RX ADMIN — HYDROCODONE BITARTRATE AND ACETAMINOPHEN 2 TABLET: 7.5; 325 TABLET ORAL at 14:36

## 2019-03-22 RX ADMIN — HYDROCODONE BITARTRATE AND ACETAMINOPHEN 2 TABLET: 7.5; 325 TABLET ORAL at 19:40

## 2019-03-22 RX ADMIN — CEFAZOLIN SODIUM 2 G: 2 INJECTION, SOLUTION INTRAVENOUS at 17:55

## 2019-03-22 RX ADMIN — SODIUM CHLORIDE, POTASSIUM CHLORIDE, SODIUM LACTATE AND CALCIUM CHLORIDE: 600; 310; 30; 20 INJECTION, SOLUTION INTRAVENOUS at 09:19

## 2019-03-22 RX ADMIN — PHENYLEPHRINE HYDROCHLORIDE 150 MCG: 10 INJECTION INTRAVENOUS at 10:55

## 2019-03-22 RX ADMIN — FENTANYL CITRATE 50 MCG: 50 INJECTION, SOLUTION INTRAMUSCULAR; INTRAVENOUS at 08:39

## 2019-03-22 RX ADMIN — MUPIROCIN 10 APPLICATION: 20 OINTMENT TOPICAL at 21:19

## 2019-03-22 RX ADMIN — SODIUM CHLORIDE, POTASSIUM CHLORIDE, SODIUM LACTATE AND CALCIUM CHLORIDE: 600; 310; 30; 20 INJECTION, SOLUTION INTRAVENOUS at 11:20

## 2019-03-22 RX ADMIN — FENTANYL CITRATE 50 MCG: 50 INJECTION, SOLUTION INTRAMUSCULAR; INTRAVENOUS at 08:48

## 2019-03-22 RX ADMIN — ENOXAPARIN SODIUM 30 MG: 30 INJECTION SUBCUTANEOUS at 16:01

## 2019-03-22 RX ADMIN — ROCURONIUM BROMIDE 20 MG: 10 INJECTION INTRAVENOUS at 10:12

## 2019-03-22 RX ADMIN — PROPOFOL 200 MG: 10 INJECTION, EMULSION INTRAVENOUS at 09:27

## 2019-03-22 RX ADMIN — CEFAZOLIN SODIUM 2 G: 2 INJECTION, SOLUTION INTRAVENOUS at 09:22

## 2019-03-22 RX ADMIN — PHENYLEPHRINE HYDROCHLORIDE 100 MCG: 10 INJECTION INTRAVENOUS at 10:58

## 2019-03-22 RX ADMIN — HYDROMORPHONE HYDROCHLORIDE 0.5 MG: 1 INJECTION, SOLUTION INTRAMUSCULAR; INTRAVENOUS; SUBCUTANEOUS at 11:33

## 2019-03-22 RX ADMIN — MELOXICAM 15 MG: 15 TABLET ORAL at 08:22

## 2019-03-22 RX ADMIN — VANCOMYCIN HYDROCHLORIDE 1500 MG: 10 INJECTION, POWDER, LYOPHILIZED, FOR SOLUTION INTRAVENOUS at 08:22

## 2019-03-22 RX ADMIN — LIDOCAINE HYDROCHLORIDE 100 MG: 20 INJECTION, SOLUTION INFILTRATION; PERINEURAL at 09:27

## 2019-03-22 RX ADMIN — DEXAMETHASONE SODIUM PHOSPHATE 8 MG: 10 INJECTION INTRAMUSCULAR; INTRAVENOUS at 09:37

## 2019-03-22 RX ADMIN — HYDROMORPHONE HYDROCHLORIDE 0.5 MG: 1 INJECTION, SOLUTION INTRAMUSCULAR; INTRAVENOUS; SUBCUTANEOUS at 11:50

## 2019-03-22 RX ADMIN — HYDROMORPHONE HYDROCHLORIDE 0.5 MG: 2 INJECTION INTRAMUSCULAR; INTRAVENOUS; SUBCUTANEOUS at 10:15

## 2019-03-22 RX ADMIN — HYDROMORPHONE HYDROCHLORIDE 0.5 MG: 1 INJECTION, SOLUTION INTRAMUSCULAR; INTRAVENOUS; SUBCUTANEOUS at 12:30

## 2019-03-22 RX ADMIN — HYDROMORPHONE HYDROCHLORIDE 0.5 MG: 1 INJECTION, SOLUTION INTRAMUSCULAR; INTRAVENOUS; SUBCUTANEOUS at 11:40

## 2019-03-22 RX ADMIN — GLYCOPYRROLATE 0.2 MG: 0.2 INJECTION INTRAMUSCULAR; INTRAVENOUS at 09:25

## 2019-03-22 NOTE — PLAN OF CARE
Problem: Patient Care Overview  Goal: Plan of Care Review  Outcome: Ongoing (interventions implemented as appropriate)   03/22/19 1685   Coping/Psychosocial   Plan of Care Reviewed With patient   OTHER   Outcome Summary VSS. Pain controlled with PO pain med. JAE dressing c/d/i. Up to chair and worked with PT today. Voiding per BRP, 2 BMs today. Ambulates with assist and walker. Walked around unit this afternoon. Discussed BP med and monitoring r/t HTN. Plans to d/c home with assist from family and perform exercises at home.    Plan of Care Review   Progress improving     Goal: Individualization and Mutuality  Outcome: Ongoing (interventions implemented as appropriate)      Problem: Fall Risk (Adult)  Goal: Identify Related Risk Factors and Signs and Symptoms  Outcome: Ongoing (interventions implemented as appropriate)    Goal: Absence of Fall  Outcome: Ongoing (interventions implemented as appropriate)      Problem: Hip Arthroplasty (Total, Partial) (Adult)  Goal: Signs and Symptoms of Listed Potential Problems Will be Absent, Minimized or Managed (Hip Arthroplasty)  Outcome: Ongoing (interventions implemented as appropriate)    Goal: Anesthesia/Sedation Recovery  Outcome: Ongoing (interventions implemented as appropriate)

## 2019-03-22 NOTE — PLAN OF CARE
Problem: Patient Care Overview  Goal: Plan of Care Review   03/22/19 2041   Coping/Psychosocial   Plan of Care Reviewed With patient;family   OTHER   Outcome Summary Pt. presents with typical post op impairments related to THR surgery that include decreased ROM, strength, and overall balance. Pt. will benefit from skilled inpt. P.T. to address his functional deficits and to assist pt. in regaining his maximum level of independence with functional mobility.

## 2019-03-22 NOTE — PROGRESS NOTES
Continued Stay Note  [facility]     Patient Name: Zachary Gerard  MRN: [id number]  Today's Date: 3/22/2019    Admit Date: 3/22/2019    Discharge Plan     Row Name 03/22/19 1646       Plan    Plan  Home with family    Patient/Family in Agreement with Plan  yes    Plan Comments  Spoke with pt in great detail regarding d/c planning. [place]closest location [address]). Pt said he had spoken with Dr. Avila and understands he will need to do exercises at home and will likely go to outpatient after his f/u appt with Dr. Avila.  Plan at this time will be to d/c home with family support.        Discharge Codes    No documentation.       Expected Discharge Date and Time     Expected Discharge Date Expected Discharge Time    Mar 23, 2019             Marivel Wu RN

## 2019-03-22 NOTE — OP NOTE
Name: Zachary Gerard  YOB: 1965    DATE OF SURGERY: 3/22/2019    PREOPERATIVE DIAGNOSIS: Left hip end-stage osteoarthritis    POSTOPERATIVE DIAGNOSIS: Left hip end-stage osteoarthritis    PROCEDURE PERFORMED: Left  total hip replacement    SURGEON: Andrew Avila M.D.    ASSISTANT: BEN ZHU    IMPLANTS:  Implant Name Type Inv. Item Serial No.  Lot No. LRB No. Used   LINER ACET R3 XLPE 20D [id number] - [id number] Implant LINER ACET R3 XLPE 20D [id number]  [employer] [id number] Left 1   SHLL ACET R3 3H [id number] - [id number] Implant SHLL ACET R3 3H [id number]  [employer] [id number] Left 1   SCRW SPH HD REFLECTION 6.5X30MM - [id number] Implant SCRW SPH HD REFLECTION 6.5X30MM  [employer] [id number] Left 1   SCRW SPH HD REFLECTION 6.5X20MM - [id number] Implant SCRW SPH HD REFLECTION 6.5X20MM  [employer] [id number] Left 1   STEM POLARSTEM CMTLESS STD CCD 135D SZ5 - [id number] Implant STEM POLARSTEM CMTLESS STD CCD 135D SZ5  [employer] [id number] Left 1   HD FEM OXINIUM TPR 12 14 36MM PLS4 - [id number] Implant HD FEM OXINIUM TPR 12 14 36MM PLS4  [employer] [id number] Left 1       Estimated Blood Loss: 200cc  Specimens : none  Complications: none    DESCRIPTION OF PROCEDURE:    The patient was taken to the operating room and placed in the supine position. A sequential compression device was carefully placed on the non-operative leg. Preoperative antibiotics were administered. Surgical time out was performed. After adequate induction of anesthesia the patient was then transferred onto the  table and positioned appropriately in the lateral decubitus position. The hip was then prepped and draped in the usual sterile fashion.    A posterior lateral surgical incision was then made.  The gluteus lashell fascia was then divided the gluteus lashell muscle was then bluntly dissected.  A Charnley self-retaining retractor was then placed.  A posterior capsulotomy was then performed.   The superior limb was divided in line with the piriformis tendon.  The hip was then dislocated.  There were end-stage arthritic findings.  The femoral neck osteotomy was performed according to the level dictated by the template.  The acetabulum was exposed with standard retractors.  The labrum and pulvinar were then excised.  The cup was then medialized with the starting acetabular reamer to the medial wall.  I then progressively reamed up to the appropriate size and 45° of abduction and 20° of anteversion. Line to line reaming was performed. At this point the bone was nicely prepared there was excellent bleeding bone. We then impacted the acetabular component in 45 of abduction and 20 of anteversion the cup was stable.  Per routine we augmented the fixation with 2 screws in the posterior and superior quadrant  The final liner was then placed and it locked in nicely.  At this point we injected the hip with anesthetic cocktail solution.  We then turned our attention to the femur.   Standard retractors were placed to expose the femur.  The box osteotome was used to create a starting point.  The canal finder was then used to sound the canal.  The lateralizing reamer was then used to lateralize into the greater trochanter.  We progressively reamed and broached until the broach was very solid to axial and rotational stresses.  At this point we placed the trial neck and head hip was very stable to flexion and internal rotation as well as extension and external rotation.  The leg lengths were measured to be equal.  We then removed the trial components,copiously irrigated the hip, and then impacted the final stem.  At this point we then trialed and chose the final head. The head was placed on a clean dry taper.  The leg lengths were again measured to be equal.  At this point the hip was copiously irrigated with pulse lavage and the capsule was then reapproximated with #1 PDS suture, and the remainder of the hip was closed  in multiple layers in standard fashion..  There was excellent hemostasis. We placed a one-eighth inch Hemovac drain.  Sterile dressing were applied. At the end of the case, the sponge and needle counts were reported as being correct. There were no known complications. The patient was then transported to the recovery room.      Andrew Avila M.D.  3/22/2019

## 2019-03-22 NOTE — ANESTHESIA POSTPROCEDURE EVALUATION
Patient: Zachary Gerard    Procedure Summary     Date:  03/22/19 Room / Location:  [facility] OR [address] [place] [address] MAIN OR    Anesthesia Start:  0919 Anesthesia Stop:  1126    Procedure:  TOTAL HIP ARTHROPLASTY (Left Hip) Diagnosis:       Primary osteoarthritis of left hip      (Primary osteoarthritis of left hip [M16.12])    Surgeon:  Andrew Avila MD Provider:  Pillo Ross MD    Anesthesia Type:  general ASA Status:  3          Anesthesia Type: general  Last vitals  BP   118/82 (03/22/19 1130)   Temp   36.6 °C (97.8 °F) (03/22/19 1123)   Pulse   79 (03/22/19 1125)   Resp   16 (03/22/19 1130)     SpO2   97 % (03/22/19 1125)     Post Anesthesia Care and Evaluation    Patient location during evaluation: PACU  Patient participation: complete - patient participated  Level of consciousness: awake and alert  Pain management: adequate  Airway patency: patent  Anesthetic complications: No anesthetic complications    Cardiovascular status: acceptable  Respiratory status: acceptable  Hydration status: acceptable    Comments: --------------------            03/22/19               1130     --------------------   BP:       118/82     Pulse:               Resp:       16       Temp:                SpO2:               --------------------

## 2019-03-22 NOTE — THERAPY EVALUATION
Acute Care - Physical Therapy Initial Evaluation  [facility]     Patient Name: Zachary Gerard  : 1965  MRN: [id number]  Today's Date: 3/22/2019   Onset of Illness/Injury or Date of Surgery: 19  Date of Referral to PT: 19  Referring Physician: Andrew Rousseau      Admit Date: 3/22/2019    Visit Dx:     ICD-10-CM ICD-9-CM   1. Difficulty walking R26.2 719.7   2. Primary osteoarthritis of left hip M16.12 715.15     Patient Active Problem List   Diagnosis   • Essential hypertension   • Irritable bowel syndrome   • Gout   • Reduced libido   • Carpal tunnel syndrome   • Abdominal hernia   • Osteoporosis   • Other specific arthropathies, not elsewhere classified, unspecified site   • Cervical radiculopathy   • Chronic insomnia   • Carotid artery calcification   • Abnormal finding on imaging   • Peripheral polyneuropathy   • Osteonecrosis of left hip (CMS/HCC)   • Pain and swelling of left lower leg   • Venous insufficiency of both lower extremities   • Foot erythema   • Swelling of foot joint, left   • Pain and swelling of toe of left foot   • Foot pain, left   • Avascular necrosis (CMS/HCC)   • Osteopenia of spine   • Testosterone deficiency   • Complex regional pain syndrome i of left lower limb   • Primary osteoarthritis of left hip   • OA (osteoarthritis) of hip     Past Medical History:   Diagnosis Date   • Adverse effect of anesthesia     PATIENT STATES WAKES UP ANGRY & COMBATIVE   • Arthritis    • CTS (carpal tunnel syndrome)    • Diverticulosis    • Gout    • Gout    • H/O Right leg pain    • Hiatal hernia    • Hypertension    • IBS (irritable bowel syndrome)    • Osteonecrosis (CMS/HCC)    • Osteoporosis    • Pulmonary emboli (CMS/HCC)      Past Surgical History:   Procedure Laterality Date   • EXTENSOR TENDON OF FOREARM / WRIST REPAIR Bilateral    • FOOT FRACTURE SURGERY     • FOOT NEUROMA SURGERY Right     Mccabe's neuroma   • HERNIA REPAIR      BILATERAL INGUINAL   • OTHER  "SURGICAL HISTORY Right 2007    Elbow surgery   • UMBILICAL HERNIA REPAIR N/A 1/16/2017    Procedure: UMBILICAL HERNIA REPAIR;  Surgeon: Fernando Montes Jr., MD;  Location: [facility] OR;  Service:         PT ASSESSMENT (last 12 hours)      Physical Therapy Evaluation     Row Name 03/22/19 1501          PT Evaluation Time/Intention    Subjective Information  complains of;fatigue  -MS     Document Type  evaluation  -MS     Mode of Treatment  physical therapy;individual therapy  -MS     Patient Effort  good  -MS     Comment  Pt. reports no pain and feeling \"good\" this PM.   -MS     Row Name 03/22/19 1501          General Information    Onset of Illness/Injury or Date of Surgery  03/22/19  -MS     Referring Physician  Andrew Rousseau  -MS     Patient Observations  agree to therapy;cooperative  -MS     Prior Level of Function  independent:  -MS     Equipment Currently Used at Home  none  -MS     Pertinent History of Current Functional Problem  Pt. is s/p Left THR  -MS     Existing Precautions/Restrictions  fall;hip, posterior  (Significant)  Left LE  -MS     Equipment Ordered for Patient  -- Pt. reports he already owns a Rwx for home use.  -MS     Risks Reviewed  patient and family:  -MS     Benefits Reviewed  patient and family:  -MS     Barriers to Rehab  none identified  -MS     Row Name 03/22/19 1501          Cognitive Assessment/Intervention- PT/OT    Orientation Status (Cognition)  oriented x 3  -MS     Follows Commands (Cognition)  WNL  -MS     Personal Safety Interventions  fall prevention program maintained;gait belt;nonskid shoes/slippers when out of bed;supervised activity  -MS     Row Name 03/22/19 1501          Mobility Assessment/Treatment    Extremity Weight-bearing Status  left lower extremity  -MS     Left Lower Extremity (Weight-bearing Status)  weight-bearing as tolerated (WBAT)  -MS     Row Name 03/22/19 1501          Bed Mobility Assessment/Treatment    Bed Mobility Assessment/Treatment  " supine-sit;sit-supine  -MS     Supine-Sit [place] (Bed Mobility)  independent  -MS     Row Name 03/22/19 1501          Transfer Assessment/Treatment    Transfer Assessment/Treatment  sit-stand transfer;stand-sit transfer  -MS     Sit-Stand [place] (Transfers)  contact guard  -MS     Stand-Sit [place] (Transfers)  contact guard  -MS     Row Name 03/22/19 1501          Sit-Stand Transfer    Assistive Device (Sit-Stand Transfers)  walker, front-wheeled  -MS     Row Name 03/22/19 1501          Stand-Sit Transfer    Assistive Device (Stand-Sit Transfers)  walker, front-wheeled  -MS     Row Name 03/22/19 1501          Gait/Stairs Assessment/Training    [place] Level (Gait)  contact guard  -MS     Assistive Device (Gait)  walker, front-wheeled  -MS     Distance in Feet (Gait)  35 feet  -MS     Pattern (Gait)  step-through  -MS     Row Name 03/22/19 1501          General ROM    GENERAL ROM COMMENTS  BUE/RLE (WFL's)  -MS     Row Name 03/22/19 1501          MMT (Manual Muscle Testing)    General MMT Comments  BUE/RLE (4-/5)  -MS     Row Name 03/22/19 1501          Therapeutic Exercise    Comment (Therapeutic Exercise)  Left THR protocol x 10 reps completed  -MS     Row Name 03/22/19 1501          Pain Assessment    Additional Documentation  Pain Scale: Numbers Pre/Post-Treatment (Group)  -MS     Row Name 03/22/19 1501          Pain Scale: Numbers Pre/Post-Treatment    Pain Scale: Numbers, Pretreatment  0/10 - no pain  -MS     Pain Scale: Numbers, Post-Treatment  0/10 - no pain  -MS     Row Name             Wound 03/22/19 0941 Left hip incision    Wound - Properties Group Date first assessed: 03/22/19  -MB Time first assessed: 0941  -MB Side: Left  -MB Location: hip  -MB Type: incision  -MB    Row Name 03/22/19 1501          Physical Therapy Clinical Impression    Date of Referral to PT  03/22/19  -MS     Criteria for Skilled Interventions Met (PT Clinical Impression)  treatment indicated  -MS     Rehab  Potential (PT Clinical Summary)  good, to achieve stated therapy goals  -MS     Row Name 03/22/19 1501          Physical Therapy Goals    Transfer Goal Selection (PT)  transfer, PT goal 1  -MS     Gait Training Goal Selection (PT)  gait training, PT goal 1  -MS     Row Name 03/22/19 1501          Transfer Goal 1 (PT)    Activity/Assistive Device (Transfer Goal 1, PT)  transfers, all;walker, rolling  -MS     [place] Level/Cues Needed (Transfer Goal 1, PT)  independent  -MS     Time Frame (Transfer Goal 1, PT)  long term goal (LTG);2 - 3 days  -MS     Row Name 03/22/19 1501          Gait Training Goal 1 (PT)    Activity/Assistive Device (Gait Training Goal 1, PT)  gait (walking locomotion);walker, rolling  -MS     [place] Level (Gait Training Goal 1, PT)  independent  -MS     Distance (Gait Goal 1, PT)  100 feet  -MS     Time Frame (Gait Training Goal 1, PT)  long term goal (LTG);2 - 3 days  -MS     Row Name 03/22/19 1501          Positioning and Restraints    Pre-Treatment Position  in bed  -MS     Post Treatment Position  chair  -MS     In Chair  notified nsg;reclined;sitting;call light within reach;encouraged to call for assist;with family/caregiver All lines intact. Ice pack to Left hip.  -MS       User Key  (r) = Recorded By, (t) = Taken By, (c) = Cosigned By    Initials Name Provider Type    Kathie Pederson, RN Registered Nurse    Jose Antonio Hutson, PT Physical Therapist        Physical Therapy Education     Title: PT OT SLP Therapies (Done)     Topic: Physical Therapy (Done)     Point: Mobility training (Done)     Learning Progress Summary           Patient Acceptance, D,E, VU,NR by MS at 3/22/2019  3:04 PM                   Point: Home exercise program (Done)     Learning Progress Summary           Patient Acceptance, D,E, VU,NR by MS at 3/22/2019  3:04 PM                   Point: Body mechanics (Done)     Learning Progress Summary           Patient Acceptance, D,E, VU,NR by MS at 3/22/2019   3:04 PM                   Point: Precautions (Done)     Learning Progress Summary           Patient Acceptance, D,E, VU,NR by MS at 3/22/2019  3:04 PM                               User Key     Initials Effective Dates Name Provider Type Discipline    MS 04/03/18 -  Jose Antonio Ramirez, PT Physical Therapist PT              PT Recommendation and Plan  Anticipated Discharge Disposition (PT): home with assist, home with home health  Planned Therapy Interventions (PT Eval): balance training, bed mobility training, gait training, home exercise program, patient/family education, postural re-education, ROM (range of motion), stair training, strengthening, transfer training  Therapy Frequency (PT Clinical Impression): 2 times/day  Outcome Summary/Treatment Plan (PT)  Anticipated Discharge Disposition (PT): home with assist, home with home health  Plan of Care Reviewed With: patient, family  Outcome Summary: Pt. presents with typical post op impairments related to THR surgery that include decreased ROM, strength, and overall balance.  Pt. will benefit from skilled inpt. P.T. to address his functional deficits and to assist pt. in regaining his maximum level of independence with functional mobility.   Outcome Measures     Row Name 03/22/19 1500             How much help from another person do you currently need...    Turning from your back to your side while in flat bed without using bedrails?  4  -MS      Moving from lying on back to sitting on the side of a flat bed without bedrails?  4  -MS      Moving to and from a bed to a chair (including a wheelchair)?  3  -MS      Standing up from a chair using your arms (e.g., wheelchair, bedside chair)?  3  -MS      Climbing 3-5 steps with a railing?  3  -MS      To walk in hospital room?  3  -MS      AM-PAC 6 Clicks Score  20  -MS         Functional Assessment    Outcome Measure Options  AM-PAC 6 Clicks Basic Mobility (PT)  -MS        User Key  (r) = Recorded By, (t) = Taken By, (c)  = Cosigned By    Initials Name Provider Type    Jose Antonio Hutson, PT Physical Therapist         Time Calculation:   PT Charges     Row Name 03/22/19 1506             Time Calculation    Start Time  1440  -MS      Stop Time  1500  -MS      Time Calculation (min)  20 min  -MS      PT Received On  03/22/19  -MS      PT - Next Appointment  03/23/19  -MS      PT Goal Re-Cert Due Date  03/24/19  -MS         Time Calculation- PT    Total Timed Code Minutes- PT  17 minute(s)  -MS        User Key  (r) = Recorded By, (t) = Taken By, (c) = Cosigned By    Initials Name Provider Type    Jose Antonio Hutson, PT Physical Therapist        Therapy Charges for Today     Code Description Service Date Service Provider Modifiers Qty    38272403027 HC PT EVAL LOW COMPLEXITY 1 3/22/2019 Jose Antonio Ramirez, PT GP 1    30975494672 HC PT THER PROC EA 15 MIN 3/22/2019 Jose Antonio Ramirez, PT GP 1          PT G-Codes  Outcome Measure Options: AM-PAC 6 Clicks Basic Mobility (PT)  AM-PAC 6 Clicks Score: 20      Jose Antonio Ramirez, PT  3/22/2019

## 2019-03-22 NOTE — ANESTHESIA PROCEDURE NOTES
Airway  Urgency: elective    Airway not difficult    General Information and Staff    Patient location during procedure: OR  Anesthesiologist: Pillo Ross MD  CRNA: Rodney Salinas CRNA    Indications and Patient Condition  Indications for airway management: airway protection    Preoxygenated: yes  MILS maintained throughout  Mask difficulty assessment: 1 - vent by mask    Final Airway Details  Final airway type: endotracheal airway      Successful airway: ETT  Cuffed: yes   Successful intubation technique: direct laryngoscopy  Facilitating devices/methods: cricoid pressure  Endotracheal tube insertion site: oral  Blade: Lo  Blade size: 3  ETT size (mm): 8.0  Cormack-Lehane Classification: grade I - full view of glottis  Placement verified by: chest auscultation and capnometry   Inital cuff pressure (cm H2O): 22  Cuff volume (mL): 8  Measured from: lips  ETT to lips (cm): 24  Number of attempts at approach: 1              
21-Mar-2019 10:15

## 2019-03-22 NOTE — ANESTHESIA PREPROCEDURE EVALUATION
Anesthesia Evaluation     Patient summary reviewed and Nursing notes reviewed   NPO Solid Status: > 8 hours  NPO Liquid Status: > 2 hours           Airway   Mallampati: II  TM distance: >3 FB  Neck ROM: full  Dental - normal exam     Pulmonary - normal exam    breath sounds clear to auscultation  (+) pulmonary embolism, a smoker (30 pack years) Former,   Cardiovascular - normal exam  Exercise tolerance: good (4-7 METS)    ECG reviewed  Rhythm: regular  Rate: normal    (+) hypertension less than 2 medications, PVD,  carotid artery disease (Carotid artery calcification)  (-) angina, orthopnea, PND, CAMPO      Neuro/Psych  (+) numbness (Carpal tunnel syndrome. Peripheral polyneuropathy),     GI/Hepatic/Renal/Endo    (+) obesity,  hiatal hernia,      Musculoskeletal     (+) chronic pain (Complex regional pain syndrome ), neck pain,   Abdominal    Substance History - negative use     OB/GYN negative ob/gyn ROS         Other   (+) arthritis (Gout)                     Anesthesia Plan    ASA 3     general     intravenous induction   Anesthetic plan, all risks, benefits, and alternatives have been provided, discussed and informed consent has been obtained with: patient.

## 2019-03-23 VITALS
SYSTOLIC BLOOD PRESSURE: 134 MMHG | WEIGHT: 235.67 LBS | DIASTOLIC BLOOD PRESSURE: 87 MMHG | RESPIRATION RATE: 18 BRPM | BODY MASS INDEX: 29.3 KG/M2 | HEIGHT: 75 IN | HEART RATE: 87 BPM | OXYGEN SATURATION: 92 % | TEMPERATURE: 97.3 F

## 2019-03-23 LAB
HCT VFR BLD AUTO: 41 % (ref 37.5–51)
HGB BLD-MCNC: 13.4 G/DL (ref 13–17.7)
INR PPP: 1.01 (ref 0.9–1.1)
PROTHROMBIN TIME: 13.1 SECONDS (ref 11.7–14.2)

## 2019-03-23 PROCEDURE — 25010000002 ENOXAPARIN PER 10 MG: Performed by: ORTHOPAEDIC SURGERY

## 2019-03-23 PROCEDURE — 85014 HEMATOCRIT: CPT | Performed by: NURSE PRACTITIONER

## 2019-03-23 PROCEDURE — 85018 HEMOGLOBIN: CPT | Performed by: NURSE PRACTITIONER

## 2019-03-23 PROCEDURE — 85610 PROTHROMBIN TIME: CPT | Performed by: NURSE PRACTITIONER

## 2019-03-23 PROCEDURE — 25010000003 CEFAZOLIN IN DEXTROSE 2-4 GM/100ML-% SOLUTION: Performed by: NURSE PRACTITIONER

## 2019-03-23 PROCEDURE — 97150 GROUP THERAPEUTIC PROCEDURES: CPT

## 2019-03-23 RX ADMIN — ENOXAPARIN SODIUM 30 MG: 30 INJECTION SUBCUTANEOUS at 08:15

## 2019-03-23 RX ADMIN — HYDROCODONE BITARTRATE AND ACETAMINOPHEN 2 TABLET: 7.5; 325 TABLET ORAL at 06:16

## 2019-03-23 RX ADMIN — ENALAPRIL MALEATE 5 MG: 5 TABLET ORAL at 08:15

## 2019-03-23 RX ADMIN — HYDROCODONE BITARTRATE AND ACETAMINOPHEN 2 TABLET: 7.5; 325 TABLET ORAL at 00:58

## 2019-03-23 RX ADMIN — GABAPENTIN 200 MG: 100 CAPSULE ORAL at 06:09

## 2019-03-23 RX ADMIN — MUPIROCIN 10 APPLICATION: 20 OINTMENT TOPICAL at 08:15

## 2019-03-23 RX ADMIN — PANTOPRAZOLE SODIUM 40 MG: 40 TABLET, DELAYED RELEASE ORAL at 06:09

## 2019-03-23 RX ADMIN — CEFAZOLIN SODIUM 2 G: 2 INJECTION, SOLUTION INTRAVENOUS at 00:58

## 2019-03-23 RX ADMIN — HYDROCODONE BITARTRATE AND ACETAMINOPHEN 2 TABLET: 7.5; 325 TABLET ORAL at 10:34

## 2019-03-23 NOTE — PLAN OF CARE
Problem: Patient Care Overview  Goal: Plan of Care Review  Outcome: Ongoing (interventions implemented as appropriate)   03/23/19 4271   Coping/Psychosocial   Plan of Care Reviewed With patient   OTHER   Outcome Summary Pt POD 0 from LTH. JAE dressing clean dry and intact. Pt voiding without difficulty. VSS. Pt ambulates with walker and assist x1. Pt educated on BP monitoring. Pt to be d/c home today with family assist. Will continue to monitor.    Plan of Care Review   Progress improving     Goal: Individualization and Mutuality  Outcome: Ongoing (interventions implemented as appropriate)    Goal: Discharge Needs Assessment  Outcome: Ongoing (interventions implemented as appropriate)    Goal: Interprofessional Rounds/Family Conf  Outcome: Ongoing (interventions implemented as appropriate)      Problem: Fall Risk (Adult)  Goal: Identify Related Risk Factors and Signs and Symptoms  Outcome: Ongoing (interventions implemented as appropriate)    Goal: Absence of Fall  Outcome: Ongoing (interventions implemented as appropriate)      Problem: Hip Arthroplasty (Total, Partial) (Adult)  Goal: Signs and Symptoms of Listed Potential Problems Will be Absent, Minimized or Managed (Hip Arthroplasty)  Outcome: Ongoing (interventions implemented as appropriate)    Goal: Anesthesia/Sedation Recovery  Outcome: Outcome(s) achieved Date Met: 03/23/19

## 2019-03-23 NOTE — THERAPY TREATMENT NOTE
Acute Care - Physical Therapy Treatment Note  [facility]     Patient Name: Zachary Gerard  : 1965  MRN: [id number]  Today's Date: 3/23/2019  Onset of Illness/Injury or Date of Surgery: 19  Date of Referral to PT: 19  Referring Physician: Andrew Rousseau    Admit Date: 3/22/2019    Visit Dx:    ICD-10-CM ICD-9-CM   1. Difficulty walking R26.2 719.7   2. Primary osteoarthritis of left hip M16.12 715.15     Patient Active Problem List   Diagnosis   • Essential hypertension   • Irritable bowel syndrome   • Gout   • Reduced libido   • Carpal tunnel syndrome   • Abdominal hernia   • Osteoporosis   • Other specific arthropathies, not elsewhere classified, unspecified site   • Cervical radiculopathy   • Chronic insomnia   • Carotid artery calcification   • Abnormal finding on imaging   • Peripheral polyneuropathy   • Osteonecrosis of left hip (CMS/HCC)   • Pain and swelling of left lower leg   • Venous insufficiency of both lower extremities   • Foot erythema   • Swelling of foot joint, left   • Pain and swelling of toe of left foot   • Foot pain, left   • Avascular necrosis (CMS/HCC)   • Osteopenia of spine   • Testosterone deficiency   • Complex regional pain syndrome i of left lower limb   • Primary osteoarthritis of left hip   • OA (osteoarthritis) of hip       Therapy Treatment    Rehabilitation Treatment Summary     Row Name 19 1154             Treatment Time/Intention    Discipline  physical therapist  -CA      Document Type  therapy note (daily note)  -CA      Subjective Information  complains of;pain  -CA      Patient Effort  good  -CA      Existing Precautions/Restrictions  fall;hip, posterior  -CA      Recorded by [CA] Joana Willis, PT 19 4935      Row Name 19 1153             Cognitive Assessment/Intervention- PT/OT    Orientation Status (Cognition)  oriented x 3  -CA      Follows Commands (Cognition)  WNL  -CA      Personal Safety Interventions  fall prevention  program maintained;gait belt;nonskid shoes/slippers when out of bed  -CA      Recorded by [CA] Joana Willis, PT 03/23/19 1155      Row Name 03/23/19 1154             Mobility Assessment/Intervention    Left Lower Extremity (Weight-bearing Status)  weight-bearing as tolerated (WBAT)  -CA      Recorded by [CA] Joana Willis, PT 03/23/19 1155      Row Name 03/23/19 1154             Sit-Stand Transfer    Sit-Stand [place] (Transfers)  stand by assist  -CA      Assistive Device (Sit-Stand Transfers)  walker, front-wheeled  -CA      Recorded by [CA] Joana Willis, PT 03/23/19 1155      Row Name 03/23/19 1154             Stand-Sit Transfer    Stand-Sit [place] (Transfers)  stand by assist  -CA      Assistive Device (Stand-Sit Transfers)  walker, front-wheeled  -CA      Recorded by [CA] Joana Willis, PT 03/23/19 1155      Row Name 03/23/19 1154             Gait/Stairs Assessment/Training    [place] Level (Gait)  stand by assist  -CA      Assistive Device (Gait)  walker, front-wheeled  -CA      Distance in Feet (Gait)  20  -CA      Pattern (Gait)  step-through  -CA      Negotiation (Stairs)  stairs independence  -CA      [place] Level (Stairs)  contact guard  -CA      Handrail Location (Stairs)  both sides  -CA      Number of Steps (Stairs)  4  -CA      Ascending Technique (Stairs)  step-to-step  -CA      Descending Technique (Stairs)  step-to-step  -CA      Recorded by [CA] Joana Willis, PT 03/23/19 1155      Row Name 03/23/19 1152             Therapeutic Exercise    Comment (Therapeutic Exercise)  x15 reps SAADIA protocol  -CA      Recorded by [CA] Joana Willis, PT 03/23/19 1155      Row Name 03/23/19 1154             Positioning and Restraints    Pre-Treatment Position  sitting in chair/recliner  -CA      Post Treatment Position  chair  -CA      In Chair  notified nsg;reclined;encouraged to call for assist;call light within reach  -CA      Recorded by [CA] Joana Willis, PT  03/23/19 1155      Row Name 03/23/19 1154             Pain Scale: Numbers Pre/Post-Treatment    Pain Scale: Numbers, Pretreatment  5/10  -CA      Pain Scale: Numbers, Post-Treatment  5/10  -CA      Recorded by [CA] Joana Willis, PT 03/23/19 1155      Row Name                Wound 03/22/19 0941 Left hip incision    Wound - Properties Group Date first assessed: 03/22/19 [MB] Time first assessed: 0941 [MB] Side: Left [MB] Location: hip [MB] Type: incision [MB] Recorded by:  [MB] Kathie Atkins RN 03/22/19 0941    Row Name 03/23/19 1154             Outcome Summary/Treatment Plan (PT)    Daily Summary of Progress (PT)  progress toward functional goals is good  -CA      Anticipated Discharge Disposition (PT)  home with assist;home with home health  -CA      Reason for Discharge (PT Discharge Summary)  patient met all goals and outcomes;no further needs identified  -CA      Recorded by [CA] Joana Willis, PT 03/23/19 1155        User Key  (r) = Recorded By, (t) = Taken By, (c) = Cosigned By    Initials Name Effective Dates Discipline    Kathie Pederson, RN 06/16/16 -  Nurse    CA Joana Willis, PT 06/13/18 -  PT          Wound 03/22/19 0941 Left hip incision (Active)   Dressing Appearance dry;intact;no drainage 3/23/2019  8:15 AM   Closure MAXIM 3/23/2019  8:15 AM   Base dressing in place, unable to visualize 3/23/2019  8:15 AM   Drainage Amount none 3/23/2019  8:15 AM           Physical Therapy Education     Title: PT OT SLP Therapies (Resolved)     Topic: Physical Therapy (Resolved)     Point: Mobility training (Resolved)     Learning Progress Summary           Patient Acceptance, D,E, VU,NR by MS at 3/22/2019  3:04 PM                   Point: Home exercise program (Resolved)     Learning Progress Summary           Patient Acceptance, D,E, VU,NR by MS at 3/22/2019  3:04 PM                   Point: Body mechanics (Resolved)     Learning Progress Summary           Patient Acceptance, D,E, VU,NR by MS at  3/22/2019  3:04 PM                   Point: Precautions (Resolved)     Learning Progress Summary           Patient Acceptance, D,E, VU,NR by MS at 3/22/2019  3:04 PM                               User Key     Initials Effective Dates Name Provider Type Discipline    MS 04/03/18 -  Jose Antonio Ramirez, PT Physical Therapist PT                PT Recommendation and Plan  Anticipated Discharge Disposition (PT): home with assist, home with home health  Outcome Summary/Treatment Plan (PT)  Daily Summary of Progress (PT): progress toward functional goals is good  Anticipated Discharge Disposition (PT): home with assist, home with home health  Reason for Discharge (PT Discharge Summary): patient met all goals and outcomes, no further needs identified  Outcome Measures     Row Name 03/23/19 1100 03/22/19 1500          How much help from another person do you currently need...    Turning from your back to your side while in flat bed without using bedrails?  4  -CA  4  -MS     Moving from lying on back to sitting on the side of a flat bed without bedrails?  4  -CA  4  -MS     Moving to and from a bed to a chair (including a wheelchair)?  4  -CA  3  -MS     Standing up from a chair using your arms (e.g., wheelchair, bedside chair)?  3  -CA  3  -MS     Climbing 3-5 steps with a railing?  3  -CA  3  -MS     To walk in hospital room?  3  -CA  3  -MS     AM-PAC 6 Clicks Score  21  -CA  20  -MS        Functional Assessment    Outcome Measure Options  AM-PAC 6 Clicks Basic Mobility (PT)  -CA  AM-PAC 6 Clicks Basic Mobility (PT)  -MS       User Key  (r) = Recorded By, (t) = Taken By, (c) = Cosigned By    Initials Name Provider Type    MS Jose Antonio Ramirez, PT Physical Therapist    Joana Nova, ADRIENNE Physical Therapist         Time Calculation:   PT Charges     Row Name 03/23/19 1156             Time Calculation    Start Time  0934  -CA      Stop Time  0954  -CA      Time Calculation (min)  20 min  -CA         Time Calculation- PT     Total Timed Code Minutes- PT  20 minute(s)  -CA        User Key  (r) = Recorded By, (t) = Taken By, (c) = Cosigned By    Initials Name Provider Type    Joana Nova, PT Physical Therapist        Therapy Charges for Today     Code Description Service Date Service Provider Modifiers Qty    59718343855  PT THER PROC GROUP 3/23/2019 Joana Willis, PT GP 1          PT G-Codes  Outcome Measure Options: AM-PAC 6 Clicks Basic Mobility (PT)  AM-PAC 6 Clicks Score: 21    Joana Willis PT  3/23/2019

## 2019-03-23 NOTE — PLAN OF CARE
Problem: Patient Care Overview  Goal: Plan of Care Review  Outcome: Ongoing (interventions implemented as appropriate)   03/23/19 1045   Coping/Psychosocial   Plan of Care Reviewed With patient   OTHER   Outcome Summary Pain well controlled with PO pain medication. Ambulates with standby assist x1. VSS. Voiding without difficulty. DC home today. Educated about BP monitoring.    Plan of Care Review   Progress improving     Goal: Individualization and Mutuality  Outcome: Ongoing (interventions implemented as appropriate)    Goal: Discharge Needs Assessment  Outcome: Ongoing (interventions implemented as appropriate)    Goal: Interprofessional Rounds/Family Conf  Outcome: Ongoing (interventions implemented as appropriate)      Problem: Fall Risk (Adult)  Goal: Identify Related Risk Factors and Signs and Symptoms  Outcome: Outcome(s) achieved Date Met: 03/23/19    Goal: Absence of Fall  Outcome: Ongoing (interventions implemented as appropriate)      Problem: Hip Arthroplasty (Total, Partial) (Adult)  Goal: Signs and Symptoms of Listed Potential Problems Will be Absent, Minimized or Managed (Hip Arthroplasty)  Outcome: Ongoing (interventions implemented as appropriate)

## 2019-03-23 NOTE — DISCHARGE SUMMARY
Patient Name: Zachary Gerard  Patient YOB: 1965    Date of Admission:  3/22/2019  Date of Discharge:  3/23/2019  Discharge Diagnosis: TOTAL HIP ARTHROPLASTY  Presenting Problem/History of Present Illness: Primary osteoarthritis of left hip [M16.12]  OA (osteoarthritis) of hip [M16.9]  Admitting Physician: Dr Andrew Aivla  Consults:   Consults     No orders found for last 30 day(s).          DETAILS OF HOSPITAL STAY:  Patient is a 53 y.o. male was admitted to the floor following the above procedure and underwent an uncomplicated hospital stay.  Patient did well with physical therapy and was ambulating well without problems.  On the day of discharge the wound was clean, dry and intact and calf was soft and non tender and Homans sign was negative.  Patient was tolerating  without problems.  Patient will be discharged home.    Condition on Discharge:  Stable    Vital Signs  Temp:  [96.7 °F (35.9 °C)-97.8 °F (36.6 °C)] 97.3 °F (36.3 °C)  Heart Rate:  [59-96] 87  Resp:  [16-18] 18  BP: (107-142)/() 134/87    LABS:   Admission on 03/22/2019   Component Date Value Ref Range Status   • ABO Type 03/22/2019 O   Final   • RH type 03/22/2019 Positive   Final   • Antibody Screen 03/22/2019 Negative   Final   • T&S Expiration Date 03/22/2019 3/25/2019 11:59:59 PM   Final   • Right Common Femoral Spont 03/22/2019 Y   Final   • Right Common Femoral Phasic 03/22/2019 Y   Final   • Right Common Femoral Augment 03/22/2019 Y   Final   • Right Common Femoral Competent 03/22/2019 Y   Final   • Right Common Femoral Compress 03/22/2019 C   Final   • Right Saphenofemoral Junction Spont 03/22/2019 Y   Final   • Right Saphenofemoral Junction Phas* 03/22/2019 Y   Final   • Right Saphenofemoral Junction Augm* 03/22/2019 Y   Final   • Right Saphenofemoral Junction Comp* 03/22/2019 Y   Final   • Right Saphenofemoral Junction Comp* 03/22/2019 C   Final   • Right Proximal Femoral Compress 03/22/2019 C   Final   • Right Mid Femoral  Spont 03/22/2019 Y   Final   • Right Mid Femoral Phasic 03/22/2019 Y   Final   • Right Mid Femoral Augment 03/22/2019 Y   Final   • Right Mid Femoral Competent 03/22/2019 Y   Final   • Right Mid Femoral Compress 03/22/2019 C   Final   • Right Distal Femoral Compress 03/22/2019 C   Final   • Right Popliteal Spont 03/22/2019 Y   Final   • Right Popliteal Phasic 03/22/2019 Y   Final   • Right Popliteal Augment 03/22/2019 Y   Final   • Right Popliteal Competent 03/22/2019 Y   Final   • Right Popliteal Compress 03/22/2019 C   Final   • Right Posterior Tibial Compress 03/22/2019 C   Final   • Right Peroneal Compress 03/22/2019 C   Final   • Right GastronemiusSoleal Compress 03/22/2019 C   Final   • Right Greater Saph AK Compress 03/22/2019 C   Final   • Right Greater Saph BK Compress 03/22/2019 C   Final   • Left Common Femoral Spont 03/22/2019 Y   Final   • Left Common Femoral Phasic 03/22/2019 Y   Final   • Left Common Femoral Augment 03/22/2019 Y   Final   • Left Common Femoral Competent 03/22/2019 Y   Final   • Left Common Femoral Compress 03/22/2019 C   Final   • Left Saphenofemoral Junction Spont 03/22/2019 Y   Final   • Left Saphenofemoral Junction Phasic 03/22/2019 Y   Final   • Left Saphenofemoral Junction Augme* 03/22/2019 Y   Final   • Left Saphenofemoral Junction Compe* 03/22/2019 Y   Final   • Left Saphenofemoral Junction Compr* 03/22/2019 C   Final   • Left Proximal Femoral Compress 03/22/2019 C   Final   • Left Mid Femoral Spont 03/22/2019 Y   Final   • Left Mid Femoral Phasic 03/22/2019 Y   Final   • Left Mid Femoral Augment 03/22/2019 Y   Final   • Left Mid Femoral Competent 03/22/2019 Y   Final   • Left Mid Femoral Compress 03/22/2019 C   Final   • Left Distal Femoral Compress 03/22/2019 C   Final   • Left Popliteal Spont 03/22/2019 Y   Final   • Left Popliteal Phasic 03/22/2019 Y   Final   • Left Popliteal Augment 03/22/2019 Y   Final   • Left Popliteal Competent 03/22/2019 Y   Final   • Left Popliteal  Compress 03/22/2019 C   Final   • Left Posterior Tibial Compress 03/22/2019 C   Final   • Left Peroneal Compress 03/22/2019 C   Final   • Left GastronemiusSoleal Compress 03/22/2019 C   Final   • Left Greater Saph AK Compress 03/22/2019 C   Final   • Left Greater Saph BK Compress 03/22/2019 C   Final   • Protime 03/22/2019 13.4  11.7 - 14.2 Seconds Final   • INR 03/22/2019 1.04  0.90 - 1.10 Final   • Hemoglobin 03/23/2019 13.4  13.0 - 17.7 g/dL Final   • Hematocrit 03/23/2019 41.0  37.5 - 51.0 % Final   • Protime 03/23/2019 13.1  11.7 - 14.2 Seconds Final   • INR 03/23/2019 1.01  0.90 - 1.10 Final       No results found.        Discharge Medications     Discharge Medications      New Medications      Instructions Start Date   CLEARLAX powder  Generic drug:  polyethylene glycol   17 g, Oral, 2 Times Daily       MG capsule  Generic drug:  docusate sodium   100 mg, Oral, 2 Times Daily      HYDROcodone-acetaminophen 7.5-325 MG per tablet  Commonly known as:  NORCO   1-2 tablets, Oral, Every 4 Hours PRN      pantoprazole 40 MG EC tablet  Commonly known as:  PROTONIX   40 mg, Oral, Every Morning      XARELTO 10 MG tablet  Generic drug:  rivaroxaban   10 mg, Oral, Daily With Dinner         Continue These Medications      Instructions Start Date   allopurinol 300 MG tablet  Commonly known as:  ZYLOPRIM   300 mg, Oral, Daily      amitriptyline 25 MG tablet  Commonly known as:  ELAVIL   50 mg, Oral, Nightly      amitriptyline 100 MG tablet  Commonly known as:  ELAVIL   100 mg, Oral, Nightly PRN      enalapril 5 MG tablet  Commonly known as:  VASOTEC   5 mg, Oral, Daily      Gabapentin Enacarbil  MG tablet controlled-release  Commonly known as:  HORIZANT   600 mg, Oral, Every 12 Hours         Stop These Medications    Chlorhexidine Gluconate Cloth 2 % pads     mupirocin 2 % nasal ointment  Commonly known as:  BACTROBAN            Activity at Discharge:     Discharge Instructions:   1)  Patient is to continue with  physical therapy exercises daily and continue working with the physical therapist as ordered.  2)  Follow NO hip precautions.   3)  Patient may weight bear as tolerated.   4)  Apply ice regularly. You may ice for long periods of time as long as ice is not directly on the skin. Patient instructed on frequent calf pumping exercises.  Patient also instructed on incentive spirometer during hospitalization and encouraged to continue to use at home regularly.   5)  The dressing should be left in place. If waterproof dressing is intact the patient may shower immediately following discharge. If the dressing becomes disloged or saturated it should be changed. Please refer to the [facility] information sheet if you have any questions about the dressing.  If you have a home health nurse or therapist they can be contacted to assist with dressing change or repair. You may also call the [facility]AE dressing hotline for questions related to the dressing ([phone]). If there still other problems or questions related to the dressing despite these measures then you can contact Monserrat at our office [phone].   6)  Follow up appointment in 2 weeks - patient to call the office at [phone] to schedule. 7)  Patient will be discharged on aspirin 325mg BID x 2weeks, then daily x 4weeks    Complete Discharge Diagnosis:    Patient Active Problem List   Diagnosis   • Essential hypertension   • Irritable bowel syndrome   • Gout   • Reduced libido   • Carpal tunnel syndrome   • Abdominal hernia   • Osteoporosis   • Other specific arthropathies, not elsewhere classified, unspecified site   • Cervical radiculopathy   • Chronic insomnia   • Carotid artery calcification   • Abnormal finding on imaging   • Peripheral polyneuropathy   • Osteonecrosis of left hip (CMS/HCC)   • Pain and swelling of left lower leg   • Venous insufficiency of both lower extremities   • Foot erythema   • Swelling of foot joint, left   • Pain and swelling of toe of left foot   •  Foot pain, left   • Avascular necrosis (CMS/HCC)   • Osteopenia of spine   • Testosterone deficiency   • Complex regional pain syndrome i of left lower limb   • Primary osteoarthritis of left hip   • OA (osteoarthritis) of hip           Follow-up Appointments  Future Appointments   Date Time Provider Department Center   4/9/2019  2:20 PM Haylee Antunez APRN [facility] None   8/20/2019  1:00 PM Nabila Echavarria APRN [facility] None              Andrew Avila MD  03/23/19  8:17 AM

## 2019-03-24 RX ORDER — OXYCODONE AND ACETAMINOPHEN 7.5; 325 MG/1; MG/1
TABLET ORAL
Qty: 40 TABLET | Refills: 0 | Status: SHIPPED | OUTPATIENT
Start: 2019-03-24 | End: 2019-04-01 | Stop reason: SDUPTHER

## 2019-03-24 RX ORDER — DIAZEPAM 5 MG/1
5 TABLET ORAL EVERY 6 HOURS PRN
Qty: 20 TABLET | Refills: 0 | Status: SHIPPED | OUTPATIENT
Start: 2019-03-24 | End: 2019-04-18

## 2019-03-29 ENCOUNTER — TELEPHONE (OUTPATIENT)
Dept: FAMILY MEDICINE CLINIC | Facility: CLINIC | Age: 54
End: 2019-03-29

## 2019-03-29 RX ORDER — TIZANIDINE HYDROCHLORIDE 4 MG/1
4 CAPSULE, GELATIN COATED ORAL 3 TIMES DAILY
Qty: 30 CAPSULE | Refills: 1 | Status: SHIPPED | OUTPATIENT
Start: 2019-03-29 | End: 2019-04-18

## 2019-03-29 NOTE — TELEPHONE ENCOUNTER
Pt is s/p left hip replacement and c/o bilateral severe  leg cramps. He called his ortho several times and has not heard back  He has tried warm compresses, otc vitamins for cramps, increasing his fluid intake and his ortho had given him valium a week ago with little relief.   Will call in tizanidine , can take up to three times a day as needed  Follow up if no improvement

## 2019-04-01 RX ORDER — OXYCODONE AND ACETAMINOPHEN 7.5; 325 MG/1; MG/1
TABLET ORAL
Qty: 40 TABLET | Refills: 0 | Status: SHIPPED | OUTPATIENT
Start: 2019-04-01 | End: 2019-04-09 | Stop reason: SDUPTHER

## 2019-04-01 NOTE — TELEPHONE ENCOUNTER
L/m stating rx is ready for  at [facility] office due to [facility] not having capability to erx narcotics to pharmacy

## 2019-04-02 ENCOUNTER — OFFICE VISIT (OUTPATIENT)
Dept: ORTHOPEDIC SURGERY | Facility: CLINIC | Age: 54
End: 2019-04-02

## 2019-04-02 ENCOUNTER — TELEPHONE (OUTPATIENT)
Dept: ORTHOPEDIC SURGERY | Facility: CLINIC | Age: 54
End: 2019-04-02

## 2019-04-02 VITALS — WEIGHT: 234 LBS | TEMPERATURE: 98.4 F | HEIGHT: 75 IN | BODY MASS INDEX: 29.09 KG/M2

## 2019-04-02 DIAGNOSIS — M16.11 PRIMARY OSTEOARTHRITIS OF RIGHT HIP: Primary | ICD-10-CM

## 2019-04-02 PROCEDURE — 99024 POSTOP FOLLOW-UP VISIT: CPT | Performed by: ORTHOPAEDIC SURGERY

## 2019-04-02 NOTE — PROGRESS NOTES
10 days out from left total hip posterior replacement without event, but he left a spot after sitting on the couch.  No fevers or drainage.  Turns out his hip wound dressing is leaking.  It feels like a big soggy diaper so I removed it and the wound beneath is pristine.  I put a regular dressing on which he will will remove nightly for showering and replace and he will see Dr. Avila in a week for staple removal

## 2019-04-02 NOTE — TELEPHONE ENCOUNTER
I actually spoke to the patient's wife.  He is saturating his dressing and it is oozing.   I have had them come in this afternoon to see Dr Ball for a wound check since they live out of town.     bsw

## 2019-04-09 ENCOUNTER — OFFICE VISIT (OUTPATIENT)
Dept: ORTHOPEDIC SURGERY | Facility: CLINIC | Age: 54
End: 2019-04-09

## 2019-04-09 VITALS — WEIGHT: 234 LBS | HEIGHT: 75 IN | BODY MASS INDEX: 29.09 KG/M2

## 2019-04-09 DIAGNOSIS — Z96.642 STATUS POST TOTAL REPLACEMENT OF LEFT HIP: Primary | ICD-10-CM

## 2019-04-09 PROCEDURE — 99024 POSTOP FOLLOW-UP VISIT: CPT | Performed by: NURSE PRACTITIONER

## 2019-04-09 PROCEDURE — 73502 X-RAY EXAM HIP UNI 2-3 VIEWS: CPT | Performed by: NURSE PRACTITIONER

## 2019-04-09 RX ORDER — OXYCODONE AND ACETAMINOPHEN 7.5; 325 MG/1; MG/1
TABLET ORAL
Qty: 40 TABLET | Refills: 0 | Status: SHIPPED | OUTPATIENT
Start: 2019-04-09 | End: 2019-04-19 | Stop reason: SDUPTHER

## 2019-04-09 NOTE — PROGRESS NOTES
Zachary Gerard : 1965 MRN: [id number] DATE: 2019    DIAGNOSIS: 2 week follow up left total hip     SUBJECTIVE:Patient returns today for 2 week follow up of left total hip replacement. Patient reports doing well with no unusual complaints. Appears to be progressing appropriately.    OBJECTIVE:   Exam:. The incision is healing appropriately. No sign of infection. Range of motion is progressing as expected. The calf is soft and nontender with a negative Homans sign.    DIAGNOSTIC STUDIES  Xrays: 2 views of the left hip (AP pelvis and lateral left hip) were ordered and reviewed for evaluation of recent hip replacement. They demonstrate a well positioned, well aligned hip replacement without complicating factors noted. In comparison with previous films there has been interval implant placement.    ASSESSMENT: 2 week status post left hip replacement.    PLAN: 1) Staples removed and steri strips applied   2) PT exercises   3) Discontinue SAMREEN hose   4) Continue ice PRN   5) WBAT   6) Xarelto 10mg 1 po qd x 30 days per rbb, ok to d/c after   7) Follow up in 6 weeks with repeat Xrays of left hip (2views)    Haylee Antunez, APRN  2019

## 2019-04-12 ENCOUNTER — TELEPHONE (OUTPATIENT)
Dept: ORTHOPEDIC SURGERY | Facility: CLINIC | Age: 54
End: 2019-04-12

## 2019-04-12 NOTE — TELEPHONE ENCOUNTER
"Per [facility] patient is to be able to walk without a walker for 10 min uninterrupted. Patient to continue rivaroxaban (XARELTO) 10 MG tablet.     Patient has been informed and stated he is going to \"hold off flying on 04/28/19\"    04/12/19 2153       "

## 2019-04-12 NOTE — TELEPHONE ENCOUNTER
"Patient's sister Kacy Collado (NOT on [facility] verbal release form) called to inform [facility] that patient had Left [place] 3/22/19 by [facility] and patient is \"wanting to travel beginning 4/28/19\" & \"wanted to make sure that it is ok with [facility] that [patient] is ok to fly\"? Please contact patient at [phone] as needed. Thanks /srh  "

## 2019-04-16 ENCOUNTER — TELEPHONE (OUTPATIENT)
Dept: ORTHOPEDIC SURGERY | Facility: CLINIC | Age: 54
End: 2019-04-16

## 2019-04-16 DIAGNOSIS — G90.522 COMPLEX REGIONAL PAIN SYNDROME I OF LEFT LOWER LIMB: Primary | ICD-10-CM

## 2019-04-16 NOTE — TELEPHONE ENCOUNTER
Patient called wanting to let [facility] know that he would like to start going back to pain mgmt for his left hip pain. Patient says that the pain is no better.

## 2019-04-17 NOTE — TELEPHONE ENCOUNTER
Spoke patient he would like to go back to pain management Dr. Flores   Order in epic 04/17/2019 1052

## 2019-04-17 NOTE — TELEPHONE ENCOUNTER
Patient returned call to Arlen from 4/16, wouldl like for someone to call him back within the next hour.

## 2019-04-18 ENCOUNTER — OFFICE VISIT (OUTPATIENT)
Dept: PAIN MEDICINE | Facility: CLINIC | Age: 54
End: 2019-04-18

## 2019-04-18 VITALS
WEIGHT: 226.2 LBS | BODY MASS INDEX: 28.12 KG/M2 | OXYGEN SATURATION: 97 % | SYSTOLIC BLOOD PRESSURE: 135 MMHG | TEMPERATURE: 97.4 F | HEART RATE: 100 BPM | HEIGHT: 75 IN | RESPIRATION RATE: 18 BRPM | DIASTOLIC BLOOD PRESSURE: 85 MMHG

## 2019-04-18 DIAGNOSIS — M79.605 LEFT LEG PAIN: ICD-10-CM

## 2019-04-18 DIAGNOSIS — G89.29 OTHER CHRONIC PAIN: Primary | ICD-10-CM

## 2019-04-18 DIAGNOSIS — M25.552 LEFT HIP PAIN: ICD-10-CM

## 2019-04-18 DIAGNOSIS — G90.522 COMPLEX REGIONAL PAIN SYNDROME I OF LEFT LOWER LIMB: ICD-10-CM

## 2019-04-18 PROCEDURE — 99214 OFFICE O/P EST MOD 30 MIN: CPT | Performed by: NURSE PRACTITIONER

## 2019-04-18 NOTE — PROGRESS NOTES
CHIEF COMPLAINT  Follow-up for leg pain. Mr. Gerard states that his left leg pain has increased. He states that he had a left hip replacement done 3 weeks ago by Dr. Andrew Avila.    Subjective   Zachary Gerard is a 53 y.o. male  who presents to the office for follow-up.He has a history of chronic pain of the LLE.    Left Lumbar sympathetic block   on  7-17-18, 6-28-18, and 6-07-18 -- 60% relief or greater for 9 months     L Total Hip replacement 3/22/19 - pain worsening since surgery, pain starts in the left hip and down the entire left leg.  He was prescribed Hydrocodone and Percocet post operatively - neither helped so he stopped taking it, took doses as high as Percocet 7.5/325 2 tabs every 4 hours without benefit.  Given Tizanidine  For muscle spasms which did not help.  Says Valium helped the muscle spasms but out of this.       Has been taking Horizant 600 mg BID for years.      Hip Pain    The incident occurred more than 1 week ago. The pain is present in the left hip. The quality of the pain is described as aching (throbbing). The pain is at a severity of 9/10. The pain is moderate. The pain has been constant since onset. Associated symptoms include a loss of motion. Pertinent negatives include no numbness. The symptoms are aggravated by movement and weight bearing. He has tried acetaminophen, ice, non-weight bearing and NSAIDs (horizant) for the symptoms. The treatment provided mild relief.   Foot Pain   This is a chronic problem. The current episode started more than 1 month ago. The problem occurs constantly. The problem has been waxing and waning. Associated symptoms include arthralgias and weakness (left leg). Pertinent negatives include no chest pain, congestion, coughing, fatigue, headaches, nausea or numbness. Treatments tried: horizant, lumbar sympathetic block. The treatment provided significant relief.     PEG Assessment   What number best describes your pain on average in the past week?8  What  "number best describes how, during the past week, pain has interfered with your enjoyment of life?9  What number best describes how, during the past week, pain has interfered with your general activity?  9    The following portions of the patient's history were reviewed and updated as appropriate: allergies, current medications, past family history, past medical history, past social history, past surgical history and problem list.    Review of Systems   Constitutional: Negative for fatigue.   HENT: Negative for congestion.    Eyes: Negative for visual disturbance.   Respiratory: Negative for cough, shortness of breath and wheezing.    Cardiovascular: Positive for leg swelling (left leg). Negative for chest pain and palpitations.   Gastrointestinal: Negative for constipation, diarrhea and nausea.   Genitourinary: Negative for difficulty urinating.   Musculoskeletal: Positive for arthralgias and back pain (left leg and hip).   Neurological: Positive for weakness (left leg). Negative for numbness and headaches.   Psychiatric/Behavioral: Positive for sleep disturbance. Negative for suicidal ideas. The patient is nervous/anxious.      Vitals:    04/18/19 1446   BP: 135/85   Pulse: 100   Resp: 18   Temp: 97.4 °F (36.3 °C)   SpO2: 97%   Weight: 103 kg (226 lb 3.2 oz)   Height: 190.5 cm (75\")   PainSc:   9   PainLoc: Leg     Objective   Physical Exam   Constitutional: He is oriented to person, place, and time. He appears well-developed and well-nourished. No distress.   Appears acutely uncomfortable   HENT:   Head: Atraumatic.   Eyes: Conjunctivae and EOM are normal.   Neck: Normal range of motion. Neck supple.   Cardiovascular: Normal rate.   Pulmonary/Chest: Effort normal. No respiratory distress.   Musculoskeletal:        Left hip: He exhibits tenderness.        Left upper leg: He exhibits tenderness.        Left lower leg: He exhibits tenderness.        Left foot: There is tenderness.   Neurological: He is alert and " oriented to person, place, and time. He has normal strength. No sensory deficit. Gait (abulating with walker) abnormal.   Skin: Skin is warm and dry. He is not diaphoretic.   Psychiatric: He has a normal mood and affect. His behavior is normal.   Nursing note and vitals reviewed.    Assessment/Plan   Zachary was seen today for leg pain.    Diagnoses and all orders for this visit:    Other chronic pain    Left hip pain    Complex regional pain syndrome i of left lower limb  -     Case Request    Left leg pain  -     Case Request    Other orders  -     pregabalin (LYRICA) 100 MG capsule; Take one po bid x 4 days then increase to tid as tolerated      --- Trial Lyrica 100 mg bid, increase to tid in 3-4 days as tolerated   --- If Lyrica helpful, will likely plan to wean from Horizant in the future. Due to acuteness of flare, will not plan to make this change at this time  --- Ultimately would recommend repeating a left lumbar sympathetic block ASAP as he has had significant success with these injections in the past.  Will need clearance from ortho prior to scheduling this as he will need to hold Xarelto. Additionally this injection contains steroid, would want the ok from ortho for this as well.    --- NSAID's unfortunately not appropriate at this time due to Xarelto.    --- Reports NO relief with high doses of pain medication post operatively, will not plan to continue at this time and he agrees with this plan - orthopedic surgery is free to continue medication if they feel that it is appropriate as this patient is not under contract here for any opioids.    --- Follow-up 1 month          RAMONA REPORT    As part of the patient's treatment plan, I am prescribing controlled substances. The patient has been made aware of appropriate use of such medications, including potential risk of somnolence, limited ability to drive and/or work safely, and the potential for dependence or overdose. It has also bee made clear that these  medications are for use by this patient only, without concomitant use of alcohol or other substances unless prescribed.     Patient has completed prescribing agreement detailing terms of continued prescribing of controlled substances, including monitoring RAMONA reports, urine drug screening, and pill counts if necessary. The patient is aware that inappropriate use will results in cessation of prescribing such medications.    RAMONA report has been reviewed and scanned into the patient's chart.    As the clinician, I personally reviewed the RAMONA from 4/17/19 while the patient was in the office today.    History and physical exam exhibit continued safe and appropriate use of controlled substances.    EMR Dragon/Transcription disclaimer:   Much of this encounter note is an electronic transcription/translation of spoken language to printed text. The electronic translation of spoken language may permit erroneous, or at times, nonsensical words or phrases to be inadvertently transcribed; Although I have reviewed the note for such errors, some may still exist.

## 2019-04-19 RX ORDER — OXYCODONE AND ACETAMINOPHEN 7.5; 325 MG/1; MG/1
TABLET ORAL
Qty: 40 TABLET | Refills: 0 | Status: SHIPPED | OUTPATIENT
Start: 2019-04-19 | End: 2019-04-19 | Stop reason: SDUPTHER

## 2019-04-19 RX ORDER — OXYCODONE AND ACETAMINOPHEN 7.5; 325 MG/1; MG/1
TABLET ORAL
Qty: 40 TABLET | Refills: 0 | Status: SHIPPED | OUTPATIENT
Start: 2019-04-19 | End: 2019-04-29 | Stop reason: SDUPTHER

## 2019-04-19 RX ORDER — PREGABALIN 100 MG/1
CAPSULE ORAL
Qty: 90 CAPSULE | Refills: 1 | Status: SHIPPED | OUTPATIENT
Start: 2019-04-19 | End: 2019-10-30

## 2019-04-19 NOTE — TELEPHONE ENCOUNTER
Sx 03/22/19  TOTAL HIP ARTHROPLASTY Left     Last filled 04/09/19 oxycodone-apap 735/325mg qty 40 3 day supply

## 2019-04-23 ENCOUNTER — TELEPHONE (OUTPATIENT)
Dept: PAIN MEDICINE | Facility: CLINIC | Age: 54
End: 2019-04-23

## 2019-04-23 NOTE — TELEPHONE ENCOUNTER
Please let Mr. Gerard know that we will need to wait until he is 90 days post op before proceeding with injections.

## 2019-04-23 NOTE — TELEPHONE ENCOUNTER
----- Message from NGUYỄN Garza sent at 4/23/2019 10:30 AM EDT -----  Regarding: RE: Injection  Would recommend that he hold off on any injections for at least 3 months following joint replacement surgery because of increased risk for infection if possible  ----- Message -----  From: Nabila Echavarria APRN  Sent: 4/19/2019   7:38 AM  To: NGUYỄN Garza  Subject: Injection                                        Viviane Leach,   I saw Mr. Gerard in the office yesterday.  He is in a great deal of pain.  Pain medication is not providing him any relief.  I added some Lyrica to his regimen to help with the neuropathic type pain.  Ultimately we recommend repeating a left lumbar sympathetic block with Dr. Flores as he has had significant success with these in the past.  This injection does contain steroid and he would also need to hold Xarelto for 3 days for the procedure.  I wanted to reach out to you to see when it would be appropriate from an orthopedic surgery standpoint to proceed with the injection.    Thank You, Josette Echavarria

## 2019-04-24 ENCOUNTER — TELEPHONE (OUTPATIENT)
Dept: PAIN MEDICINE | Facility: CLINIC | Age: 54
End: 2019-04-24

## 2019-04-24 NOTE — TELEPHONE ENCOUNTER
Discussed plan of care with Dr. Flores.  Orthopedic surgery does not want him to have steroid injections until he is 3 months post-operative due to increasing risk for infection.  Dr. Flores says that he has seen success with sympathetic blocks w/o steroid, so only using the local numbing agent in patients with CRPS, especially in post-op flare up's like what this patient is experiencing.  Please let Zachary know that once he has completed the full course of Xarelto that we can set him up for a series of sympathetic blocks (USING LOCAL ONLY) about 2-3 days apart X 3 with a goal of stopping the cycle of this flare that he is in.  Please let me know if he would like to proceed and we will arrange scheduling with Dr. Flores and/or Dr. Price. We would need to know the date of when he will complete the 30 days of Xarelto as well.

## 2019-04-26 ENCOUNTER — PRIOR AUTHORIZATION (OUTPATIENT)
Dept: PAIN MEDICINE | Facility: CLINIC | Age: 54
End: 2019-04-26

## 2019-04-26 NOTE — TELEPHONE ENCOUNTER
SOO JONAS (Key: [id number]) – [id number]  Lyrica 100MG OR CAPS  Status: PA Request  Created: April 26th, 2019  Sent: April 26th, 2019

## 2019-04-30 RX ORDER — OXYCODONE AND ACETAMINOPHEN 7.5; 325 MG/1; MG/1
TABLET ORAL
Qty: 30 TABLET | Refills: 0 | Status: SHIPPED | OUTPATIENT
Start: 2019-04-30 | End: 2019-05-17

## 2019-05-07 ENCOUNTER — OFFICE VISIT (OUTPATIENT)
Dept: ORTHOPEDIC SURGERY | Facility: CLINIC | Age: 54
End: 2019-05-07

## 2019-05-07 VITALS — WEIGHT: 227 LBS | BODY MASS INDEX: 28.23 KG/M2 | HEIGHT: 75 IN | TEMPERATURE: 98.7 F

## 2019-05-07 DIAGNOSIS — Z96.642 STATUS POST TOTAL REPLACEMENT OF LEFT HIP: Primary | ICD-10-CM

## 2019-05-07 PROCEDURE — 99024 POSTOP FOLLOW-UP VISIT: CPT | Performed by: ORTHOPAEDIC SURGERY

## 2019-05-07 PROCEDURE — 73502 X-RAY EXAM HIP UNI 2-3 VIEWS: CPT | Performed by: ORTHOPAEDIC SURGERY

## 2019-05-07 NOTE — PROGRESS NOTES
Zachary Gerard : 1965 MRN: [id number] DATE: 2019    DIAGNOSIS: 6 week follow up left total hip     SUBJECTIVE:Patient returns today for 6 week follow up of left total hip replacement. Patient reports doing well but struggling with left leg radiculopathy. Otherwise, appears to be progressing appropriately and is using a cane    OBJECTIVE:   Exam:. The incision is healed. No sign of infection. Range of motion is progressing as expected. The calf is soft and nontender with a negative Homans sign. Strength progressing    DIAGNOSTIC STUDIES  Xrays: 2 views of the left hip (AP pelvis and lateral left hip) were ordered and reviewed for evaluation of recent hip replacement. They demonstrate a well positioned, well aligned hip replacement without complicating factors noted. In comparison with previous films there has been interval implant placement.    ASSESSMENT: 6 week status post left hip replacement. Complicated by CRPS -  Seeing pain manangement  Soon for possible lumbar block.    PLAN: 1) Activity as tolerated   2) Continue hip strengthening exercises    3) Follow up 1 year post-op with repeat Xrays of left hip (2views AP Pelvis      and lateral left hip)    Andrew Avila MD  2019

## 2019-05-13 DIAGNOSIS — I10 ESSENTIAL HYPERTENSION: ICD-10-CM

## 2019-05-13 DIAGNOSIS — Z11.59 NEED FOR HEPATITIS C SCREENING TEST: Primary | ICD-10-CM

## 2019-05-13 DIAGNOSIS — E55.9 VITAMIN D DEFICIENCY: ICD-10-CM

## 2019-05-15 LAB
25(OH)D3+25(OH)D2 SERPL-MCNC: 32.5 NG/ML (ref 30–100)
ALBUMIN SERPL-MCNC: 4.1 G/DL (ref 3.5–5.2)
ALBUMIN/GLOB SERPL: 1.6 G/DL
ALP SERPL-CCNC: 143 U/L (ref 39–117)
ALT SERPL-CCNC: 19 U/L (ref 1–41)
APPEARANCE UR: CLEAR
AST SERPL-CCNC: 13 U/L (ref 1–40)
BACTERIA #/AREA URNS HPF: NORMAL /HPF
BASOPHILS # BLD AUTO: 0.02 10*3/MM3 (ref 0–0.2)
BASOPHILS NFR BLD AUTO: 0.3 % (ref 0–1.5)
BILIRUB SERPL-MCNC: 0.4 MG/DL (ref 0.2–1.2)
BILIRUB UR QL STRIP: NEGATIVE
BUN SERPL-MCNC: 11 MG/DL (ref 6–20)
BUN/CREAT SERPL: 13.4 (ref 7–25)
CALCIUM SERPL-MCNC: 9.7 MG/DL (ref 8.6–10.5)
CASTS URNS MICRO: NORMAL
CHLORIDE SERPL-SCNC: 99 MMOL/L (ref 98–107)
CHOLEST SERPL-MCNC: 151 MG/DL (ref 0–200)
CO2 SERPL-SCNC: 26.8 MMOL/L (ref 22–29)
COLOR UR: YELLOW
CREAT SERPL-MCNC: 0.82 MG/DL (ref 0.76–1.27)
EOSINOPHIL # BLD AUTO: 0.09 10*3/MM3 (ref 0–0.4)
EOSINOPHIL NFR BLD AUTO: 1.3 % (ref 0.3–6.2)
EPI CELLS #/AREA URNS HPF: NORMAL /HPF
ERYTHROCYTE [DISTWIDTH] IN BLOOD BY AUTOMATED COUNT: 13.7 % (ref 12.3–15.4)
GLOBULIN SER CALC-MCNC: 2.5 GM/DL
GLUCOSE SERPL-MCNC: 98 MG/DL (ref 65–99)
GLUCOSE UR QL: NEGATIVE
HCT VFR BLD AUTO: 51.1 % (ref 37.5–51)
HCV AB S/CO SERPL IA: <0.1 S/CO RATIO (ref 0–0.9)
HCV AB SERPL QL IA: NORMAL
HDLC SERPL-MCNC: 42 MG/DL (ref 40–60)
HGB BLD-MCNC: 16.3 G/DL (ref 13–17.7)
HGB UR QL STRIP: NEGATIVE
IMM GRANULOCYTES # BLD AUTO: 0.02 10*3/MM3 (ref 0–0.05)
IMM GRANULOCYTES NFR BLD AUTO: 0.3 % (ref 0–0.5)
KETONES UR QL STRIP: NEGATIVE
LDLC SERPL CALC-MCNC: 77 MG/DL (ref 0–100)
LDLC/HDLC SERPL: 1.83 {RATIO}
LEUKOCYTE ESTERASE UR QL STRIP: NEGATIVE
LYMPHOCYTES # BLD AUTO: 1.75 10*3/MM3 (ref 0.7–3.1)
LYMPHOCYTES NFR BLD AUTO: 25.8 % (ref 19.6–45.3)
MCH RBC QN AUTO: 31.9 PG (ref 26.6–33)
MCHC RBC AUTO-ENTMCNC: 31.9 G/DL (ref 31.5–35.7)
MCV RBC AUTO: 100 FL (ref 79–97)
MONOCYTES # BLD AUTO: 0.51 10*3/MM3 (ref 0.1–0.9)
MONOCYTES NFR BLD AUTO: 7.5 % (ref 5–12)
NEUTROPHILS # BLD AUTO: 4.4 10*3/MM3 (ref 1.7–7)
NEUTROPHILS NFR BLD AUTO: 64.8 % (ref 42.7–76)
NITRITE UR QL STRIP: NEGATIVE
NRBC BLD AUTO-RTO: 0 /100 WBC (ref 0–0.2)
PH UR STRIP: 7 [PH] (ref 5–8)
PLATELET # BLD AUTO: 199 10*3/MM3 (ref 140–450)
POTASSIUM SERPL-SCNC: 4.7 MMOL/L (ref 3.5–5.2)
PROT SERPL-MCNC: 6.6 G/DL (ref 6–8.5)
PROT UR QL STRIP: NEGATIVE
RBC # BLD AUTO: 5.11 10*6/MM3 (ref 4.14–5.8)
RBC #/AREA URNS HPF: NORMAL /HPF
SODIUM SERPL-SCNC: 140 MMOL/L (ref 136–145)
SP GR UR: 1.01 (ref 1–1.03)
TRIGL SERPL-MCNC: 161 MG/DL (ref 0–150)
TSH SERPL DL<=0.005 MIU/L-ACNC: 4.42 MIU/ML (ref 0.27–4.2)
UROBILINOGEN UR STRIP-MCNC: (no result) MG/DL
VLDLC SERPL CALC-MCNC: 32.2 MG/DL
WBC # BLD AUTO: 6.79 10*3/MM3 (ref 3.4–10.8)
WBC #/AREA URNS HPF: NORMAL /HPF

## 2019-05-17 ENCOUNTER — OFFICE VISIT (OUTPATIENT)
Dept: FAMILY MEDICINE CLINIC | Facility: CLINIC | Age: 54
End: 2019-05-17

## 2019-05-17 ENCOUNTER — TELEPHONE (OUTPATIENT)
Dept: FAMILY MEDICINE CLINIC | Facility: CLINIC | Age: 54
End: 2019-05-17

## 2019-05-17 VITALS
SYSTOLIC BLOOD PRESSURE: 122 MMHG | OXYGEN SATURATION: 97 % | HEART RATE: 100 BPM | HEIGHT: 75 IN | RESPIRATION RATE: 18 BRPM | WEIGHT: 232.8 LBS | DIASTOLIC BLOOD PRESSURE: 82 MMHG | TEMPERATURE: 98.5 F | BODY MASS INDEX: 28.95 KG/M2

## 2019-05-17 DIAGNOSIS — F51.04 CHRONIC INSOMNIA: ICD-10-CM

## 2019-05-17 DIAGNOSIS — M1A.9XX0 CHRONIC GOUT WITHOUT TOPHUS, UNSPECIFIED CAUSE, UNSPECIFIED SITE: ICD-10-CM

## 2019-05-17 DIAGNOSIS — R79.89 ELEVATED TSH: ICD-10-CM

## 2019-05-17 DIAGNOSIS — R91.1 PULMONARY NODULE: Primary | ICD-10-CM

## 2019-05-17 DIAGNOSIS — I10 ESSENTIAL HYPERTENSION: ICD-10-CM

## 2019-05-17 LAB
Lab: NORMAL
URATE SERPL-MCNC: 5.3 MG/DL (ref 3.4–7)
WRITTEN AUTHORIZATION: NORMAL

## 2019-05-17 PROCEDURE — 99214 OFFICE O/P EST MOD 30 MIN: CPT | Performed by: NURSE PRACTITIONER

## 2019-05-17 RX ORDER — ALLOPURINOL 300 MG/1
300 TABLET ORAL DAILY
Qty: 90 TABLET | Refills: 1 | Status: SHIPPED | OUTPATIENT
Start: 2019-05-17 | End: 2019-10-21 | Stop reason: SDUPTHER

## 2019-05-17 RX ORDER — ENALAPRIL MALEATE 5 MG/1
5 TABLET ORAL DAILY
Qty: 90 TABLET | Refills: 1 | Status: SHIPPED | OUTPATIENT
Start: 2019-05-17 | End: 2019-10-21 | Stop reason: SDUPTHER

## 2019-05-17 RX ORDER — AMITRIPTYLINE HYDROCHLORIDE 100 MG/1
100 TABLET, FILM COATED ORAL NIGHTLY PRN
Qty: 90 TABLET | Refills: 1 | Status: SHIPPED | OUTPATIENT
Start: 2019-05-17 | End: 2019-10-21 | Stop reason: SDUPTHER

## 2019-05-17 RX ORDER — AMITRIPTYLINE HYDROCHLORIDE 25 MG/1
50 TABLET, FILM COATED ORAL NIGHTLY
Qty: 180 TABLET | Refills: 1 | Status: SHIPPED | OUTPATIENT
Start: 2019-05-17 | End: 2019-10-21 | Stop reason: SDUPTHER

## 2019-05-17 NOTE — PROGRESS NOTES
Subjective   Zachary Gerard is a 54 y.o. male. Patient is here today for   Chief Complaint   Patient presents with   • Hypertension     FOLLOW UP LABS          Vitals:    19 1132   BP: 122/82   Pulse: 100   Resp: 18   Temp: 98.5 °F (36.9 °C)   SpO2: 97%       The following portions of the patient's history were reviewed and updated as appropriate: allergies, current medications, past family history, past medical history, past social history, past surgical history and problem list.    Past Medical History:   Diagnosis Date   • Adverse effect of anesthesia     PATIENT STATES WAKES UP ANGRY & COMBATIVE   • Arthritis    • CTS (carpal tunnel syndrome)    • Diverticulosis    • Gout    • Gout    • H/O Right leg pain    • Hiatal hernia    • Hypertension    • IBS (irritable bowel syndrome)    • Osteonecrosis (CMS/HCC)    • Osteoporosis    • Pulmonary emboli (CMS/HCC)       No Known Allergies   Social History     Socioeconomic History   • Marital status:      Spouse name: Padma   • Number of children: Not on file   • Years of education: College   • Highest education level: Not on file   Occupational History   • Occupation: Business owner     Comment: [employer]   Tobacco Use   • Smoking status: Former Smoker     Packs/day: 1.50     Years: 20.00     Pack years: 30.00     Types: Cigarettes     Last attempt to quit: 2016     Years since quittin.6   • Smokeless tobacco: Never Used   Substance and Sexual Activity   • Alcohol use: Yes     Alcohol/week: 4.2 oz     Types: 7 Glasses of wine per week     Comment: 1 GLASS WINE PER NIGHT   • Drug use: No   • Sexual activity: Yes     Partners: Female        Current Outpatient Medications:   •  allopurinol (ZYLOPRIM) 300 MG tablet, Take 1 tablet by mouth Daily., Disp: 90 tablet, Rfl: 1  •  amitriptyline (ELAVIL) 100 MG tablet, Take 1 tablet by mouth At Night As Needed for Sleep., Disp: 90 tablet, Rfl: 1  •  amitriptyline (ELAVIL) 25 MG tablet, Take 2 tablets by mouth  Every Night., Disp: 180 tablet, Rfl: 1  •  enalapril (VASOTEC) 5 MG tablet, Take 1 tablet by mouth Daily., Disp: 90 tablet, Rfl: 1  •  Gabapentin Enacarbil ER (HORIZANT) 600 MG tablet controlled-release, Take 600 mg by mouth Every 12 (Twelve) Hours., Disp: 60 tablet, Rfl: 3  •  pregabalin (LYRICA) 100 MG capsule, Take one po bid x 4 days then increase to tid as tolerated, Disp: 90 capsule, Rfl: 1  •  aspirin 81 MG tablet, Take 1 tablet by mouth Daily., Disp: , Rfl:   No current facility-administered medications for this visit.     Facility-Administered Medications Ordered in Other Visits:   •  mupirocin (BACTROBAN) 2 % nasal ointment, , Nasal, BID, Andrew Avila MD     Objective   Zachary is here for to follow up on labs. He has a history of HTN, gout, and insomnia. He is almost 2 months  post op s/p hip  replacement and has been doing well. He is followed by pain management for chronic pain. He has chronic insomnia and takes amitriptyline nightly. He is doing well on it. He has no complaints today. I reviewed his labs with him in detail and gave him a copy       Hypertension   This is a chronic problem. The problem is controlled. Pertinent negatives include no blurred vision, chest pain, headaches, malaise/fatigue, palpitations, peripheral edema, shortness of breath or sweats. There are no associated agents to hypertension. Risk factors for coronary artery disease include male gender, stress, smoking/tobacco exposure and family history. Past treatments include beta blockers. Current antihypertension treatment includes beta blockers. There are no compliance problems.         Review of Systems   Constitutional: Negative for fatigue, malaise/fatigue and unexpected weight change.   HENT: Negative.    Eyes: Negative for blurred vision.   Respiratory: Negative for cough, chest tightness and shortness of breath.    Cardiovascular: Negative for chest pain, palpitations and leg swelling.   Genitourinary: Negative.     Musculoskeletal: Positive for arthralgias.   Skin: Negative for rash.   Neurological: Negative for dizziness and headaches.   Psychiatric/Behavioral: Positive for sleep disturbance.       Physical Exam   Constitutional: Vital signs are normal. He appears well-developed and well-nourished. He does not appear ill. No distress.   HENT:   Head: Normocephalic.   Nose: Nose normal.   Neck: Trachea normal. Carotid bruit is not present.   Cardiovascular: Normal rate, regular rhythm and normal heart sounds.   Pulmonary/Chest: Effort normal and breath sounds normal.   Neurological: He is alert.   Skin: Skin is warm and dry.   Psychiatric: He has a normal mood and affect.     Orders Only on 05/13/2019   Component Date Value Ref Range Status   • Hepatitis C Ab 05/14/2019 <0.1  0.0 - 0.9 s/co ratio Final   • 25 Hydroxy, Vitamin D 05/14/2019 32.5  30.0 - 100.0 ng/ml Final    Comment: Reference Range for Total Vitamin D 25(OH)  Deficiency <20.0 ng/mL  Insufficiency 21-29 ng/mL  Sufficiency  ng/mL  Toxicity >100 ng/ml     • Specific Gravity, UA 05/14/2019 1.010  1.005 - 1.030 Final   • pH, UA 05/14/2019 7.0  5.0 - 8.0 Final   • Color, UA 05/14/2019 Yellow   Final    REFERENCE RANGE: Yellow, Straw   • Appearance, UA 05/14/2019 Clear  Clear Final   • Leukocytes, UA 05/14/2019 Negative  Negative Final   • Protein 05/14/2019 Negative  Negative Final   • Glucose, UA 05/14/2019 Negative  Negative Final   • Ketones 05/14/2019 Negative  Negative Final   • Blood, UA 05/14/2019 Negative  Negative Final   • Bilirubin, UA 05/14/2019 Negative  Negative Final   • Urobilinogen, UA 05/14/2019 Comment   Final    Comment: 0.2 E.U./dL  REFERENCE RANGE: 0.2 - 1.0 E.U./dL     • Nitrite, UA 05/14/2019 Negative  Negative Final   • Glucose 05/14/2019 98  65 - 99 mg/dL Final   • BUN 05/14/2019 11  6 - 20 mg/dL Final   • Creatinine 05/14/2019 0.82  0.76 - 1.27 mg/dL Final   • eGFR Non  Am 05/14/2019 97  >60 mL/min/1.73 Final   • eGFR African  Am 05/14/2019 118  >60 mL/min/1.73 Final   • BUN/Creatinine Ratio 05/14/2019 13.4  7.0 - 25.0 Final   • Sodium 05/14/2019 140  136 - 145 mmol/L Final   • Potassium 05/14/2019 4.7  3.5 - 5.2 mmol/L Final   • Chloride 05/14/2019 99  98 - 107 mmol/L Final   • Total CO2 05/14/2019 26.8  22.0 - 29.0 mmol/L Final   • Calcium 05/14/2019 9.7  8.6 - 10.5 mg/dL Final   • Total Protein 05/14/2019 6.6  6.0 - 8.5 g/dL Final   • Albumin 05/14/2019 4.10  3.50 - 5.20 g/dL Final   • Globulin 05/14/2019 2.5  gm/dL Final   • A/G Ratio 05/14/2019 1.6  g/dL Final   • Total Bilirubin 05/14/2019 0.4  0.2 - 1.2 mg/dL Final   • Alkaline Phosphatase 05/14/2019 143* 39 - 117 U/L Final   • AST (SGOT) 05/14/2019 13  1 - 40 U/L Final   • ALT (SGPT) 05/14/2019 19  1 - 41 U/L Final   • Total Cholesterol 05/14/2019 151  0 - 200 mg/dL Final   • Triglycerides 05/14/2019 161* 0 - 150 mg/dL Final   • HDL Cholesterol 05/14/2019 42  40 - 60 mg/dL Final   • VLDL Cholesterol 05/14/2019 32.2  mg/dL Final   • LDL Cholesterol  05/14/2019 77  0 - 100 mg/dL Final   • LDL/HDL Ratio 05/14/2019 1.83   Final   • TSH 05/14/2019 4.420* 0.270 - 4.200 mIU/mL Final   • WBC 05/14/2019 6.79  3.40 - 10.80 10*3/mm3 Final   • RBC 05/14/2019 5.11  4.14 - 5.80 10*6/mm3 Final   • Hemoglobin 05/14/2019 16.3  13.0 - 17.7 g/dL Final   • Hematocrit 05/14/2019 51.1* 37.5 - 51.0 % Final   • MCV 05/14/2019 100.0* 79.0 - 97.0 fL Final   • MCH 05/14/2019 31.9  26.6 - 33.0 pg Final   • MCHC 05/14/2019 31.9  31.5 - 35.7 g/dL Final   • RDW 05/14/2019 13.7  12.3 - 15.4 % Final   • Platelets 05/14/2019 199  140 - 450 10*3/mm3 Final   • Neutrophil Rel % 05/14/2019 64.8  42.7 - 76.0 % Final   • Lymphocyte Rel % 05/14/2019 25.8  19.6 - 45.3 % Final   • Monocyte Rel % 05/14/2019 7.5  5.0 - 12.0 % Final   • Eosinophil Rel % 05/14/2019 1.3  0.3 - 6.2 % Final   • Basophil Rel % 05/14/2019 0.3  0.0 - 1.5 % Final   • Neutrophils Absolute 05/14/2019 4.40  1.70 - 7.00 10*3/mm3 Final   • Lymphocytes  Absolute 05/14/2019 1.75  0.70 - 3.10 10*3/mm3 Final   • Monocytes Absolute 05/14/2019 0.51  0.10 - 0.90 10*3/mm3 Final   • Eosinophils Absolute 05/14/2019 0.09  0.00 - 0.40 10*3/mm3 Final   • Basophils Absolute 05/14/2019 0.02  0.00 - 0.20 10*3/mm3 Final   • Immature Granulocyte Rel % 05/14/2019 0.3  0.0 - 0.5 % Final   • Immature Grans Absolute 05/14/2019 0.02  0.00 - 0.05 10*3/mm3 Final   • nRBC 05/14/2019 0.0  0.0 - 0.2 /100 WBC Final   • WBC, UA 05/14/2019 Comment  /HPF Final    Comment: None Seen  REFERENCE RANGE: None Seen, 0-2     • RBC, UA 05/14/2019 0-2  /HPF Final    REFERENCE RANGE: None Seen, 0-2   • Epithelial Cells (non renal) 05/14/2019 Comment  /HPF Final    Comment: None Seen  REFERENCE RANGE: None Seen, 0-2     • Cast Type 05/14/2019 Comment   Final    HYALINE CASTS, UA            None Seen        /LPF       None Seen  N   • Bacteria, UA 05/14/2019 Comment  None Seen /HPF Final    None Seen   • Interpretation 05/14/2019 Comment   Final    Comment: Negative  Not infected with HCV, unless recent infection is suspected or other  evidence exists to indicate HCV infection.           ASSESSMENT  Problem List Items Addressed This Visit     Essential hypertension    Relevant Medications    enalapril (VASOTEC) 5 MG tablet    Gout    Chronic insomnia    Relevant Medications    amitriptyline (ELAVIL) 25 MG tablet      Other Visit Diagnoses     Pulmonary nodule    -  Primary    Relevant Orders    CT Chest With Contrast    Elevated TSH              PLAN  His blood pressure is well controlled  Continue amitriptyline for insomnia  Will get a CT of the chest instead of the low dose lung cancer screening due to history of pulmonary nodule.   His tsh is slightly elevated so will recheck next month  Discussed diet, exercise, and weight loss  Will add a uric acid on to his labs he had drawn a few days ago  Recommend vascular screenings- [place] at [place]  Will get records from his last colonoscopy- he does not  remember where he had it done    Return in about 6 months (around 11/17/2019) for Annual physical WITH EKG . lipid panel, tsh, free t4, psa, UA, cbc,

## 2019-05-17 NOTE — TELEPHONE ENCOUNTER
DONE AND FAXED OVER    ----- Message from NGUYỄN Snider sent at 5/17/2019 12:30 PM EDT -----  Can you please contact [facility] and have them add on a uric acid level to his labs that he had drawn a few days ago (h/o gout)

## 2019-05-24 ENCOUNTER — HOSPITAL ENCOUNTER (OUTPATIENT)
Dept: CARDIOLOGY | Facility: HOSPITAL | Age: 54
Discharge: HOME | End: 2019-05-24
Admitting: NURSE PRACTITIONER

## 2019-05-24 ENCOUNTER — OFFICE VISIT (OUTPATIENT)
Dept: FAMILY MEDICINE CLINIC | Facility: CLINIC | Age: 54
End: 2019-05-24

## 2019-05-24 VITALS
BODY MASS INDEX: 29.59 KG/M2 | WEIGHT: 238 LBS | HEIGHT: 75 IN | HEART RATE: 104 BPM | OXYGEN SATURATION: 96 % | DIASTOLIC BLOOD PRESSURE: 78 MMHG | TEMPERATURE: 98.8 F | SYSTOLIC BLOOD PRESSURE: 122 MMHG

## 2019-05-24 DIAGNOSIS — M79.604 LEG PAIN, BILATERAL: ICD-10-CM

## 2019-05-24 DIAGNOSIS — M79.605 LEG PAIN, BILATERAL: Primary | ICD-10-CM

## 2019-05-24 DIAGNOSIS — M79.604 PAIN IN BOTH LOWER EXTREMITIES: Primary | ICD-10-CM

## 2019-05-24 DIAGNOSIS — M79.604 LEG PAIN, BILATERAL: Primary | ICD-10-CM

## 2019-05-24 DIAGNOSIS — M79.605 LEG PAIN, BILATERAL: ICD-10-CM

## 2019-05-24 DIAGNOSIS — M79.605 PAIN IN BOTH LOWER EXTREMITIES: Primary | ICD-10-CM

## 2019-05-24 LAB
BH CV LOWER VASCULAR LEFT COMMON FEMORAL AUGMENT: NORMAL
BH CV LOWER VASCULAR LEFT COMMON FEMORAL COMPETENT: NORMAL
BH CV LOWER VASCULAR LEFT COMMON FEMORAL COMPRESS: NORMAL
BH CV LOWER VASCULAR LEFT COMMON FEMORAL PHASIC: NORMAL
BH CV LOWER VASCULAR LEFT COMMON FEMORAL SPONT: NORMAL
BH CV LOWER VASCULAR LEFT DISTAL FEMORAL COMPRESS: NORMAL
BH CV LOWER VASCULAR LEFT GASTRONEMIUS COMPRESS: NORMAL
BH CV LOWER VASCULAR LEFT GREATER SAPH AK COMPRESS: NORMAL
BH CV LOWER VASCULAR LEFT GREATER SAPH BK COMPRESS: NORMAL
BH CV LOWER VASCULAR LEFT LESSER SAPH COMPRESS: NORMAL
BH CV LOWER VASCULAR LEFT MID FEMORAL AUGMENT: NORMAL
BH CV LOWER VASCULAR LEFT MID FEMORAL COMPETENT: NORMAL
BH CV LOWER VASCULAR LEFT MID FEMORAL COMPRESS: NORMAL
BH CV LOWER VASCULAR LEFT MID FEMORAL PHASIC: NORMAL
BH CV LOWER VASCULAR LEFT MID FEMORAL SPONT: NORMAL
BH CV LOWER VASCULAR LEFT PERONEAL COMPRESS: NORMAL
BH CV LOWER VASCULAR LEFT POPLITEAL AUGMENT: NORMAL
BH CV LOWER VASCULAR LEFT POPLITEAL COMPETENT: NORMAL
BH CV LOWER VASCULAR LEFT POPLITEAL COMPRESS: NORMAL
BH CV LOWER VASCULAR LEFT POPLITEAL PHASIC: NORMAL
BH CV LOWER VASCULAR LEFT POPLITEAL SPONT: NORMAL
BH CV LOWER VASCULAR LEFT POSTERIOR TIBIAL COMPRESS: NORMAL
BH CV LOWER VASCULAR LEFT PROXIMAL FEMORAL COMPRESS: NORMAL
BH CV LOWER VASCULAR LEFT SAPHENOFEMORAL JUNCTION AUGMENT: NORMAL
BH CV LOWER VASCULAR LEFT SAPHENOFEMORAL JUNCTION COMPETENT: NORMAL
BH CV LOWER VASCULAR LEFT SAPHENOFEMORAL JUNCTION COMPRESS: NORMAL
BH CV LOWER VASCULAR LEFT SAPHENOFEMORAL JUNCTION PHASIC: NORMAL
BH CV LOWER VASCULAR LEFT SAPHENOFEMORAL JUNCTION SPONT: NORMAL
BH CV LOWER VASCULAR RIGHT COMMON FEMORAL AUGMENT: NORMAL
BH CV LOWER VASCULAR RIGHT COMMON FEMORAL COMPETENT: NORMAL
BH CV LOWER VASCULAR RIGHT COMMON FEMORAL COMPRESS: NORMAL
BH CV LOWER VASCULAR RIGHT COMMON FEMORAL PHASIC: NORMAL
BH CV LOWER VASCULAR RIGHT COMMON FEMORAL SPONT: NORMAL
BH CV LOWER VASCULAR RIGHT DISTAL FEMORAL COMPRESS: NORMAL
BH CV LOWER VASCULAR RIGHT GASTRONEMIUS COMPRESS: NORMAL
BH CV LOWER VASCULAR RIGHT GREATER SAPH AK COMPRESS: NORMAL
BH CV LOWER VASCULAR RIGHT GREATER SAPH BK COMPRESS: NORMAL
BH CV LOWER VASCULAR RIGHT LESSER SAPH COMPRESS: NORMAL
BH CV LOWER VASCULAR RIGHT MID FEMORAL AUGMENT: NORMAL
BH CV LOWER VASCULAR RIGHT MID FEMORAL COMPETENT: NORMAL
BH CV LOWER VASCULAR RIGHT MID FEMORAL COMPRESS: NORMAL
BH CV LOWER VASCULAR RIGHT MID FEMORAL PHASIC: NORMAL
BH CV LOWER VASCULAR RIGHT MID FEMORAL SPONT: NORMAL
BH CV LOWER VASCULAR RIGHT PERONEAL COMPRESS: NORMAL
BH CV LOWER VASCULAR RIGHT POPLITEAL AUGMENT: NORMAL
BH CV LOWER VASCULAR RIGHT POPLITEAL COMPETENT: NORMAL
BH CV LOWER VASCULAR RIGHT POPLITEAL COMPRESS: NORMAL
BH CV LOWER VASCULAR RIGHT POPLITEAL PHASIC: NORMAL
BH CV LOWER VASCULAR RIGHT POPLITEAL SPONT: NORMAL
BH CV LOWER VASCULAR RIGHT POSTERIOR TIBIAL COMPRESS: NORMAL
BH CV LOWER VASCULAR RIGHT PROXIMAL FEMORAL COMPRESS: NORMAL
BH CV LOWER VASCULAR RIGHT SAPHENOFEMORAL JUNCTION AUGMENT: NORMAL
BH CV LOWER VASCULAR RIGHT SAPHENOFEMORAL JUNCTION COMPETENT: NORMAL
BH CV LOWER VASCULAR RIGHT SAPHENOFEMORAL JUNCTION COMPRESS: NORMAL
BH CV LOWER VASCULAR RIGHT SAPHENOFEMORAL JUNCTION PHASIC: NORMAL
BH CV LOWER VASCULAR RIGHT SAPHENOFEMORAL JUNCTION SPONT: NORMAL
BH CV LOWER VASCULAR RIGHT VARICOSITY BK COMPRESS: NORMAL

## 2019-05-24 PROCEDURE — 93970 EXTREMITY STUDY: CPT

## 2019-05-24 PROCEDURE — 99213 OFFICE O/P EST LOW 20 MIN: CPT | Performed by: NURSE PRACTITIONER

## 2019-05-24 RX ORDER — METHYLPREDNISOLONE 4 MG/1
TABLET ORAL
Qty: 21 TABLET | Refills: 0 | Status: SHIPPED | OUTPATIENT
Start: 2019-05-24 | End: 2019-10-21

## 2019-05-24 NOTE — PROGRESS NOTES
Bilateral lower extremity venous doppler completed. Both legs were negative for deep and superficial vein thrombosis. Results were called to Anisha ADRIAN.

## 2019-05-24 NOTE — PROGRESS NOTES
Subjective   Zachary Gerard is a 54 y.o. male.   Chief Complaint   Patient presents with   • Foot Swelling     Bitlateral swellig of the feet      Vitals:    05/24/19 1342   BP: 122/78   Pulse: 104   Temp: 98.8 °F (37.1 °C)   SpO2: 96%     No LMP for male patient.    History of Present Illness  Zachary is here for an acute visit. He c/o bilateral lower extremity pain that started a week ago. and swelling that started yesterday which is worse on the right than the left. He has a complex history of complex regional pain syndrome, polyneuropathy, and venous insufficiency. He has seen many specialists over the years. He is 9 weeks s/p right hip replacement and saw his ortho last week. They discussed started a steroid pack for his hip and leg pain  but he declined at the time.     The following portions of the patient's history were reviewed and updated as appropriate: allergies, current medications, past family history, past medical history, past social history, past surgical history and problem list.    Review of Systems   Constitutional: Negative for chills, fatigue and fever.   Respiratory: Negative.    Cardiovascular: Positive for leg swelling.   Musculoskeletal: Positive for arthralgias.   Neurological: Positive for numbness. Negative for weakness.       Objective   Physical Exam   Constitutional: Vital signs are normal. He appears well-developed and well-nourished.   Cardiovascular: Normal rate, regular rhythm and normal heart sounds.   Pulmonary/Chest: Effort normal.   Musculoskeletal:        Right lower leg: He exhibits swelling.        Left lower leg: He exhibits swelling.   Varicose veins noted    Neurological: He is alert.       Assessment/Plan   Zachary was seen today for foot swelling.    Diagnoses and all orders for this visit:    Pain in both lower extremities    Other orders  -     methylPREDNISolone (MEDROL, MEHNAZ,) 4 MG tablet; Take as directed on package instructions.      Outpatient venous doppler which I  ordered prior to his appt  was negative for DVT  Elevate legs  Use compression stockings  Follow up with pain management  Start medrol dosepak- due to his history of avascular necrosis and osteoporosis , only recommend using steroids sparingly for short term  Discussed going to the [facility]   Follow up as needed

## 2019-05-28 ENCOUNTER — HOSPITAL ENCOUNTER (OUTPATIENT)
Dept: CT IMAGING | Facility: HOSPITAL | Age: 54
Discharge: HOME | End: 2019-05-28
Admitting: NURSE PRACTITIONER

## 2019-05-28 DIAGNOSIS — R91.1 PULMONARY NODULE: ICD-10-CM

## 2019-05-28 LAB — CREAT BLDA-MCNC: 1 MG/DL (ref 0.6–1.3)

## 2019-05-28 PROCEDURE — 25010000002 IOPAMIDOL 61 % SOLUTION: Performed by: NURSE PRACTITIONER

## 2019-05-28 PROCEDURE — 71260 CT THORAX DX C+: CPT

## 2019-05-28 PROCEDURE — 82565 ASSAY OF CREATININE: CPT

## 2019-05-28 RX ADMIN — IOPAMIDOL 85 ML: 612 INJECTION, SOLUTION INTRAVENOUS at 11:27

## 2019-05-29 ENCOUNTER — TELEPHONE (OUTPATIENT)
Dept: FAMILY MEDICINE CLINIC | Facility: CLINIC | Age: 54
End: 2019-05-29

## 2019-05-29 NOTE — TELEPHONE ENCOUNTER
CALLED AND S/W PT AND ADVISED WHAT WALESKA SAID. PT VOICED UNDERSTANDING.     ----- Message from NGUYỄN Snider sent at 5/29/2019  8:04 AM EDT -----  Please call him and let him know that his CT scan of the chest shows a benign stable nodule, no further CT follow up is needed

## 2019-06-06 ENCOUNTER — OUTSIDE FACILITY SERVICE (OUTPATIENT)
Dept: PAIN MEDICINE | Facility: CLINIC | Age: 54
End: 2019-06-06

## 2019-06-06 ENCOUNTER — DOCUMENTATION (OUTPATIENT)
Dept: PAIN MEDICINE | Facility: CLINIC | Age: 54
End: 2019-06-06

## 2019-06-06 PROCEDURE — 64520 N BLOCK LUMBAR/THORACIC: CPT | Performed by: ANESTHESIOLOGY

## 2019-06-09 NOTE — PROGRESS NOTES
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   We discussed various topics including but not limited to bleeding, infection, injury, nerve injury, paralysis, coma, death, postprocedural painful flare-up, temporary entire leg weakness, numbness, temperature change, postprocedural site soreness, and a lack of pain relief.  We discussed the diagnostic aspect of lumbar sympathetic nerve blockade.    SURGEON:  Deshawn Flores MD    REASON FOR PROCEDURE:    He has hypersensitivity in the vicinity of the left knee, [facility] complex [facility] syndrome diagnosis.  Furthermore he has had significant sustained relief from sympathetic blockade in the past.  Recurrent pain    SEDATION:  Patient declined administration of moderate sedation      LOCAL ANESTHETIC: Marcaine 0.5%  STEROID:   Methylprednisolone (DEPO MEDROL) 80mg/ml   TOTAL VOLUME OF SOLUTION:  5 ml    DESCRIPTON OF PROCEDURE:    After obtaining informed consent, I.V. was started in the preop area.   The patient was taken to the operating room and placed in the prone position. EKG, blood pressure, and pulse oximeter were monitored throughout, and sedation was provided as needed by the RN under my guidance. All pressure points were well padded.      Starting in the oblique view toward the appropriate side, the transverse process of the appropriate level was identified. At the injection level, the tip of the transverse process was overlying the anterolateral margin of the vertebral  body. The skin and subcutaneous tissue was anesthetized with 1% lidocaine just cephalad and anterolateral to the verebral body. A 22-gauge spinal needle was introduced cephalad to the transverse process and under fluorocopic guidance with serial images every 1 cm until the needle tip gently contacted the vertebral body. Under lateral view, the needle was gently advanced anteriorly until the tip was over the anteiror one-third of the vertebral body. Needle position was verified in the AP fluoroscopic view with the needle tip lying medial to the lateral margin of the vertebral body. Aspiration was verified to be negative. 2 ml of omnipaque was injected under real time fluoroscopy, in both the lateral and AP demension, to verify lack of vascular uptake and appropriate spread cephalad and caudal. After appropriate spread was confirmed, the local anesthetic solution with steroid was injected with aspiration every couple ml. The needle was then removed intact.  Vital signs remained stable throughout.      ESTIMATED BLOOD LOSS:  <5 mL  SPECIMENS:  None    COMPLICATIONS: No complications were noted., There was no indication of vascular uptake on live injection of contrast dye., There was no indication of intrathecal uptake on live injection of contrast dye., There was not any evidence of dural puncture.   and The patient did not have any signs of postprocedure numbness nor weakness.    TOLERANCE & DISCHARGE CONDITION:    The patient tolerated the procedure well.  The patient was transported to the recovery area without difficulties.  The patient was monitored for at least 30 minutes after the procedure, and temperature increase in the ipsilateral upper extremity was noted.  The patient was discharged to home under the care of family in stable and satisfactory condition.    PLAN OF CARE:  1. The patient was given our standard instruction sheet.  2. The patient will Return to clinic 2 wks.    3. The patient will resume all  medications as per the medication reconciliation sheet.

## 2019-07-11 ENCOUNTER — OFFICE VISIT (OUTPATIENT)
Dept: ORTHOPEDIC SURGERY | Facility: CLINIC | Age: 54
End: 2019-07-11

## 2019-07-11 VITALS — WEIGHT: 228 LBS | HEIGHT: 75 IN | BODY MASS INDEX: 28.35 KG/M2 | TEMPERATURE: 98.5 F

## 2019-07-11 DIAGNOSIS — Z96.642 STATUS POST TOTAL REPLACEMENT OF LEFT HIP: Primary | ICD-10-CM

## 2019-07-11 PROCEDURE — 73502 X-RAY EXAM HIP UNI 2-3 VIEWS: CPT | Performed by: ORTHOPAEDIC SURGERY

## 2019-07-11 PROCEDURE — 99213 OFFICE O/P EST LOW 20 MIN: CPT | Performed by: ORTHOPAEDIC SURGERY

## 2019-07-11 RX ORDER — CYCLOBENZAPRINE HYDROCHLORIDE 7.5 MG/1
TABLET, FILM COATED ORAL
Refills: 0 | COMMUNITY
Start: 2019-06-01 | End: 2019-10-21

## 2019-07-11 RX ORDER — METHYLPREDNISOLONE 4 MG/1
TABLET ORAL
Qty: 21 TABLET | Refills: 0 | Status: SHIPPED | OUTPATIENT
Start: 2019-07-11 | End: 2019-10-21

## 2019-07-11 RX ORDER — NABUMETONE 750 MG/1
TABLET, FILM COATED ORAL
Refills: 0 | COMMUNITY
Start: 2019-06-01 | End: 2019-10-21

## 2019-07-11 NOTE — PROGRESS NOTES
4Patient: Zachary Gerard  YOB: 1965 54 y.o. male  Medical Record Number: [id number]    Chief Complaint:   Chief Complaint   Patient presents with   • Left Hip - Follow-up       History of Present Illness:Zachary Gerard is a 54 y.o. male who presents for follow-up of left total hip he is about 4 months out from surgery he was doing quite well he has been fairly active over the last week and is developed some lateral soreness he describes as a moderate to severe aching pain heat.  He denies any groin or anterior thigh pain.    Allergies: No Known Allergies    Medications:   Current Outpatient Medications   Medication Sig Dispense Refill   • allopurinol (ZYLOPRIM) 300 MG tablet Take 1 tablet by mouth Daily. 90 tablet 1   • amitriptyline (ELAVIL) 100 MG tablet Take 1 tablet by mouth At Night As Needed for Sleep. 90 tablet 1   • amitriptyline (ELAVIL) 25 MG tablet Take 2 tablets by mouth Every Night. 180 tablet 1   • aspirin 81 MG tablet Take 1 tablet by mouth Daily.     • cyclobenzaprine (FEXMID) 7.5 MG tablet TK 1 T PO HS. WILL CAUSE DROWSINESS  0   • enalapril (VASOTEC) 5 MG tablet Take 1 tablet by mouth Daily. 90 tablet 1   • Gabapentin Enacarbil ER (HORIZANT) 600 MG tablet controlled-release Take 600 mg by mouth Every 12 (Twelve) Hours. 60 tablet 3   • methylPREDNISolone (MEDROL, MEHNAZ,) 4 MG tablet Take as directed on package instructions. 21 tablet 0   • nabumetone (RELAFEN) 750 MG tablet   0   • pregabalin (LYRICA) 100 MG capsule Take one po bid x 4 days then increase to tid as tolerated 90 capsule 1     No current facility-administered medications for this visit.      Facility-Administered Medications Ordered in Other Visits   Medication Dose Route Frequency Provider Last Rate Last Dose   • mupirocin (BACTROBAN) 2 % nasal ointment   Nasal BID Andrew Avila MD             The following portions of the patient's history were reviewed and updated as appropriate: allergies, current medications, past  "family history, past medical history, past social history, past surgical history and problem list.    Review of Systems:   A 14 point review of systems was performed. All systems negative except pertinent positives/negative listed in HPI above    Physical Exam:   Vitals:    07/11/19 1015   Temp: 98.5 °F (36.9 °C)   Weight: 103 kg (228 lb)   Height: 190.5 cm (75\")       General: A and O x 3, ASA, NAD    SCLERA:    Normal    DENTITION:   Normal  Incision is well-healed he has good range of motion without irritability there is tenderness about the left hip trochanteric bursa region.  He walks a slight antalgic gait calf is soft is intact light touch with palpable distal pulses    Radiology:     Xrays 2views left hip (AP bilateral hips and lateral hip) were ordered and reviewed for evaluation of hip pain demonstrating a well positioned total hip without evidence of wear, loosening, or osteoarthritis  Comparison views: todays xrays were compared to previous xrays and demonstrate no change    Assessment/Plan:  Left total hip replacement mechanically fine.  I think most of his symptoms are related to trochanteric bursitis.  Placed him on a Medrol Dosepak and given prescription for anti-inflammatory gel to apply twice a day.  If he is not better in 3 or 4 weeks she can call back for a bursa injection.      Andrew Avila MD  7/11/2019  "

## 2019-08-06 ENCOUNTER — OFFICE VISIT (OUTPATIENT)
Dept: ORTHOPEDIC SURGERY | Facility: CLINIC | Age: 54
End: 2019-08-06

## 2019-08-06 VITALS — WEIGHT: 232 LBS | BODY MASS INDEX: 28.85 KG/M2 | TEMPERATURE: 98.4 F | HEIGHT: 75 IN

## 2019-08-06 DIAGNOSIS — Z96.642 STATUS POST TOTAL REPLACEMENT OF LEFT HIP: Primary | ICD-10-CM

## 2019-08-06 PROCEDURE — 73502 X-RAY EXAM HIP UNI 2-3 VIEWS: CPT | Performed by: NURSE PRACTITIONER

## 2019-08-06 PROCEDURE — 99212 OFFICE O/P EST SF 10 MIN: CPT | Performed by: NURSE PRACTITIONER

## 2019-08-06 RX ORDER — DULOXETIN HYDROCHLORIDE 30 MG/1
30 CAPSULE, DELAYED RELEASE ORAL
Refills: 0 | COMMUNITY
Start: 2019-07-19 | End: 2020-06-17

## 2019-08-06 NOTE — PROGRESS NOTES
Patient: Zachary Gerard  YOB: 1965 54 y.o. male  Medical Record Number: [id number]    Chief Complaints:   Chief Complaint   Patient presents with   • Left Hip - Follow-up, Pain       History of Present Illness:Zachary Gerard is a 54 y.o. male who presents for follow-up of left hip bursitis.  Dr. Avila gave him prescription for an anti-inflammatory cream doing great, denies any further pain or problems    Allergies: No Known Allergies    Medications:   Current Outpatient Medications   Medication Sig Dispense Refill   • allopurinol (ZYLOPRIM) 300 MG tablet Take 1 tablet by mouth Daily. 90 tablet 1   • amitriptyline (ELAVIL) 100 MG tablet Take 1 tablet by mouth At Night As Needed for Sleep. 90 tablet 1   • amitriptyline (ELAVIL) 25 MG tablet Take 2 tablets by mouth Every Night. 180 tablet 1   • aspirin 81 MG tablet Take 1 tablet by mouth Daily.     • cyclobenzaprine (FEXMID) 7.5 MG tablet TK 1 T PO HS. WILL CAUSE DROWSINESS  0   • DULoxetine (CYMBALTA) 30 MG capsule Take 30 mg by mouth every night at bedtime.  0   • enalapril (VASOTEC) 5 MG tablet Take 1 tablet by mouth Daily. 90 tablet 1   • LYRICA 75 MG capsule TAKE 1-2 CAPSULES BY MOUTH TWICE DAILY  0   • methylPREDNISolone (MEDROL, MEHNAZ,) 4 MG tablet Take as directed on package instructions. 21 tablet 0   • nabumetone (RELAFEN) 750 MG tablet   0   • pregabalin (LYRICA) 100 MG capsule Take one po bid x 4 days then increase to tid as tolerated 90 capsule 1   • Gabapentin Enacarbil ER (HORIZANT) 600 MG tablet controlled-release Take 600 mg by mouth Every 12 (Twelve) Hours. 60 tablet 3   • methylPREDNISolone (MEDROL, MEHNAZ,) 4 MG tablet Use as directed by package instructions 21 tablet 0     No current facility-administered medications for this visit.      Facility-Administered Medications Ordered in Other Visits   Medication Dose Route Frequency Provider Last Rate Last Dose   • mupirocin (BACTROBAN) 2 % nasal ointment   Nasal BID Andrew Avila MD      "        The following portions of the patient's history were reviewed and updated as appropriate: allergies, current medications, past family history, past medical history, past social history, past surgical history and problem list.    Review of Systems:   A 14 point review of systems was performed. All systems negative except pertinent positives/negative listed in HPI above    Physical Exam:   Vitals:    08/06/19 1309   Temp: 98.4 °F (36.9 °C)   Weight: 105 kg (232 lb)   Height: 190.5 cm (75\")       General: A and O x 3, ASA, NAD    SCLERA:    Normal    DENTITION:   Normal  Left hip patient is nontender palpation is excellent range of motion no instability    Radiology:  Xrays 2 views left hip ordered and reviewed today secondary to previous pain and shows a well-placed well-positioned left total hip replacement.  Compared to views unchanged    Assessment/Plan:  Status post left SAADIA with resolved bursitis    Patient will continue with regular exercise program we will see him back for his one-year follow-up  "

## 2019-10-21 ENCOUNTER — OFFICE VISIT (OUTPATIENT)
Dept: FAMILY MEDICINE CLINIC | Facility: CLINIC | Age: 54
End: 2019-10-21

## 2019-10-21 VITALS
SYSTOLIC BLOOD PRESSURE: 118 MMHG | BODY MASS INDEX: 29.47 KG/M2 | HEIGHT: 75 IN | TEMPERATURE: 98.2 F | WEIGHT: 237 LBS | HEART RATE: 93 BPM | OXYGEN SATURATION: 97 % | DIASTOLIC BLOOD PRESSURE: 56 MMHG | RESPIRATION RATE: 18 BRPM

## 2019-10-21 DIAGNOSIS — M1A.9XX0 CHRONIC GOUT WITHOUT TOPHUS, UNSPECIFIED CAUSE, UNSPECIFIED SITE: ICD-10-CM

## 2019-10-21 DIAGNOSIS — I10 ESSENTIAL HYPERTENSION: Primary | ICD-10-CM

## 2019-10-21 DIAGNOSIS — F51.04 CHRONIC INSOMNIA: ICD-10-CM

## 2019-10-21 PROBLEM — L53.9 FOOT ERYTHEMA: Status: RESOLVED | Noted: 2018-04-06 | Resolved: 2019-10-21

## 2019-10-21 PROBLEM — M79.672 FOOT PAIN, LEFT: Status: RESOLVED | Noted: 2018-04-06 | Resolved: 2019-10-21

## 2019-10-21 PROBLEM — M25.475 SWELLING OF FOOT JOINT, LEFT: Status: RESOLVED | Noted: 2018-04-06 | Resolved: 2019-10-21

## 2019-10-21 PROBLEM — M79.662 PAIN AND SWELLING OF LEFT LOWER LEG: Status: RESOLVED | Noted: 2018-04-06 | Resolved: 2019-10-21

## 2019-10-21 PROBLEM — M25.552 LEFT HIP PAIN: Status: RESOLVED | Noted: 2019-04-18 | Resolved: 2019-10-21

## 2019-10-21 PROBLEM — M79.89 PAIN AND SWELLING OF TOE OF LEFT FOOT: Status: RESOLVED | Noted: 2018-04-06 | Resolved: 2019-10-21

## 2019-10-21 PROBLEM — R93.89 ABNORMAL FINDING ON IMAGING: Status: RESOLVED | Noted: 2017-09-05 | Resolved: 2019-10-21

## 2019-10-21 PROBLEM — M79.89 PAIN AND SWELLING OF LEFT LOWER LEG: Status: RESOLVED | Noted: 2018-04-06 | Resolved: 2019-10-21

## 2019-10-21 PROBLEM — M79.675 PAIN AND SWELLING OF TOE OF LEFT FOOT: Status: RESOLVED | Noted: 2018-04-06 | Resolved: 2019-10-21

## 2019-10-21 PROCEDURE — 99213 OFFICE O/P EST LOW 20 MIN: CPT | Performed by: NURSE PRACTITIONER

## 2019-10-21 RX ORDER — AMITRIPTYLINE HYDROCHLORIDE 100 MG/1
100 TABLET, FILM COATED ORAL NIGHTLY PRN
Qty: 90 TABLET | Refills: 1 | Status: SHIPPED | OUTPATIENT
Start: 2019-10-21 | End: 2020-05-19 | Stop reason: SDUPTHER

## 2019-10-21 RX ORDER — AMITRIPTYLINE HYDROCHLORIDE 25 MG/1
50 TABLET, FILM COATED ORAL NIGHTLY
Qty: 180 TABLET | Refills: 1 | Status: SHIPPED | OUTPATIENT
Start: 2019-10-21 | End: 2020-08-05 | Stop reason: SDUPTHER

## 2019-10-21 RX ORDER — ALLOPURINOL 300 MG/1
300 TABLET ORAL DAILY
Qty: 90 TABLET | Refills: 1 | Status: SHIPPED | OUTPATIENT
Start: 2019-10-21 | End: 2020-05-11

## 2019-10-21 RX ORDER — ENALAPRIL MALEATE 5 MG/1
5 TABLET ORAL DAILY
Qty: 90 TABLET | Refills: 1 | Status: SHIPPED | OUTPATIENT
Start: 2019-10-21 | End: 2020-05-19 | Stop reason: SDUPTHER

## 2019-10-21 NOTE — PROGRESS NOTES
Subjective   Zachary Gerard is a 54 y.o. male. Patient is here today for   Chief Complaint   Patient presents with   • Med Check          Vitals:    10/21/19 1249   BP: 118/56   Pulse: 93   Resp: 18   Temp: 98.2 °F (36.8 °C)   SpO2: 97%     The following portions of the patient's history were reviewed and updated as appropriate: allergies, current medications, past family history, past medical history, past social history, past surgical history and problem list.    Past Medical History:   Diagnosis Date   • Adverse effect of anesthesia     PATIENT STATES WAKES UP ANGRY & COMBATIVE   • Arthritis    • CTS (carpal tunnel syndrome)    • Diverticulosis    • Gout    • Gout    • H/O Right leg pain 2011   • Hiatal hernia    • Hypertension    • IBS (irritable bowel syndrome)    • Osteonecrosis (CMS/HCC)    • Osteoporosis    • Pulmonary emboli (CMS/HCC)       No Known Allergies   Social History     Socioeconomic History   • Marital status:      Spouse name: Padma   • Number of children: Not on file   • Years of education: College   • Highest education level: Not on file   Occupational History   • Occupation: Business owner     Comment: [employer]   Tobacco Use   • Smoking status: Former Smoker     Packs/day: 1.50     Years: 20.00     Pack years: 30.00     Types: Cigarettes     Last attempt to quit: 9/7/2016     Years since quitting: 3.1   • Smokeless tobacco: Never Used   Substance and Sexual Activity   • Alcohol use: Yes     Alcohol/week: 4.2 oz     Types: 7 Glasses of wine per week     Comment: 1 GLASS WINE PER NIGHT   • Drug use: No   • Sexual activity: Yes     Partners: Female        Current Outpatient Medications:   •  allopurinol (ZYLOPRIM) 300 MG tablet, Take 1 tablet by mouth Daily., Disp: 90 tablet, Rfl: 1  •  amitriptyline (ELAVIL) 100 MG tablet, Take 1 tablet by mouth At Night As Needed for Sleep., Disp: 90 tablet, Rfl: 1  •  amitriptyline (ELAVIL) 25 MG tablet, Take 2 tablets by mouth Every Night., Disp: 180  tablet, Rfl: 1  •  aspirin 81 MG tablet, Take 1 tablet by mouth Daily., Disp: , Rfl:   •  DULoxetine (CYMBALTA) 30 MG capsule, Take 30 mg by mouth every night at bedtime., Disp: , Rfl: 0  •  enalapril (VASOTEC) 5 MG tablet, Take 1 tablet by mouth Daily., Disp: 90 tablet, Rfl: 1  •  pregabalin (LYRICA) 100 MG capsule, Take one po bid x 4 days then increase to tid as tolerated, Disp: 90 capsule, Rfl: 1  •  Gabapentin Enacarbil ER (HORIZANT) 600 MG tablet controlled-release, Take 600 mg by mouth Every 12 (Twelve) Hours., Disp: 60 tablet, Rfl: 3  No current facility-administered medications for this visit.     Facility-Administered Medications Ordered in Other Visits:   •  mupirocin (BACTROBAN) 2 % nasal ointment, , Nasal, BID, Andrew Avila MD     Objective     History of Present Illness  Zachary is here for medication refills. He has a history of HTN and takes enalapril daily. His blood pressure is well controlled. He eats a healthy diet. He has a history of gout and takes allopurinol.  He has a history of insomnia and takes amitriptyline nightly  He is compliant with his medication and is not experiencing any side effects     Review of Systems   Constitutional: Negative for chills, fatigue and fever.   Eyes: Negative for visual disturbance.   Respiratory: Negative.    Cardiovascular: Negative.    Gastrointestinal: Negative.    Musculoskeletal:        Chronic pain    Neurological: Negative for dizziness, weakness, numbness and headaches.   Psychiatric/Behavioral: Positive for sleep disturbance. The patient is not nervous/anxious.        Physical Exam   Constitutional: He is oriented to person, place, and time. Vital signs are normal. He appears well-developed and well-nourished. He does not appear ill. No distress.   HENT:   Head: Normocephalic.   Cardiovascular: Normal rate, regular rhythm and normal heart sounds.   Pulmonary/Chest: Effort normal and breath sounds normal.   Neurological: He is alert and oriented to  person, place, and time.   Skin: Skin is warm, dry and intact.   Psychiatric: He has a normal mood and affect.       ASSESSMENT     Problem List Items Addressed This Visit     Essential hypertension - Primary    Relevant Medications    enalapril (VASOTEC) 5 MG tablet    Gout    Chronic insomnia    Relevant Medications    amitriptyline (ELAVIL) 25 MG tablet          PLAN   HTN is well controlled  Insomnia is well controlled on amitriptyline     Return for Annual physical, with labs. in November

## 2019-10-30 ENCOUNTER — OFFICE VISIT (OUTPATIENT)
Dept: FAMILY MEDICINE CLINIC | Facility: CLINIC | Age: 54
End: 2019-10-30

## 2019-10-30 VITALS
WEIGHT: 235 LBS | BODY MASS INDEX: 29.22 KG/M2 | HEIGHT: 75 IN | HEART RATE: 106 BPM | OXYGEN SATURATION: 98 % | SYSTOLIC BLOOD PRESSURE: 120 MMHG | RESPIRATION RATE: 16 BRPM | DIASTOLIC BLOOD PRESSURE: 70 MMHG

## 2019-10-30 DIAGNOSIS — G90.522 COMPLEX REGIONAL PAIN SYNDROME I OF LEFT LOWER LIMB: Primary | ICD-10-CM

## 2019-10-30 PROCEDURE — 99213 OFFICE O/P EST LOW 20 MIN: CPT | Performed by: INTERNAL MEDICINE

## 2019-10-30 RX ORDER — PREGABALIN 75 MG/1
75 CAPSULE ORAL 2 TIMES DAILY
Refills: 0 | COMMUNITY
Start: 2019-10-25 | End: 2020-08-11

## 2019-10-30 NOTE — PROGRESS NOTES
Subjective   Zachary Gerard is a 54 y.o. male. Patient is here today for problems with his feet.  He has a condition called complex regional pain syndrome involving both feet where he will get severe swelling in the feet.  He has been seeing pain specialist and has gotten injections in the feet which have been helpful.  Unfortunately his pain specialist, Dr. Flores has gone on maternity leave and he needs a referral elsewhere for treatment as he feels condition starting to worsen again.  He has seen orthopedics, Dr. Julian Avila and I am wondering whether their foot specialist might be able to help.  Chief Complaint   Patient presents with   • Foot Swelling     PT HAVING SWELLING IN BOTH FEET, CAN BARELY GET SHOES ON          Vitals:    10/30/19 1529   BP: 120/70   Pulse: 106   Resp: 16   SpO2: 98%     The following portions of the patient's history were reviewed and updated as appropriate: allergies, current medications, past family history, past medical history, past social history, past surgical history and problem list.    Past Medical History:   Diagnosis Date   • Adverse effect of anesthesia     PATIENT STATES WAKES UP ANGRY & COMBATIVE   • Arthritis    • CTS (carpal tunnel syndrome)    • Diverticulosis    • Gout    • Gout    • H/O Right leg pain 2011   • Hiatal hernia    • Hypertension    • IBS (irritable bowel syndrome)    • Osteonecrosis (CMS/HCC)    • Osteoporosis    • Pulmonary emboli (CMS/HCC)       No Known Allergies   Social History     Socioeconomic History   • Marital status:      Spouse name: Padma   • Number of children: Not on file   • Years of education: College   • Highest education level: Not on file   Occupational History   • Occupation: Business owner     Comment: [employer]   Tobacco Use   • Smoking status: Former Smoker     Packs/day: 1.50     Years: 20.00     Pack years: 30.00     Types: Cigarettes     Last attempt to quit: 9/7/2016     Years since quitting: 3.1   • Smokeless tobacco:  Never Used   Substance and Sexual Activity   • Alcohol use: Yes     Alcohol/week: 4.2 oz     Types: 7 Glasses of wine per week     Comment: 1 GLASS WINE PER NIGHT   • Drug use: No   • Sexual activity: Yes     Partners: Female        Current Outpatient Medications:   •  allopurinol (ZYLOPRIM) 300 MG tablet, Take 1 tablet by mouth Daily., Disp: 90 tablet, Rfl: 1  •  amitriptyline (ELAVIL) 100 MG tablet, Take 1 tablet by mouth At Night As Needed for Sleep., Disp: 90 tablet, Rfl: 1  •  amitriptyline (ELAVIL) 25 MG tablet, Take 2 tablets by mouth Every Night., Disp: 180 tablet, Rfl: 1  •  aspirin 81 MG tablet, Take 1 tablet by mouth Daily., Disp: , Rfl:   •  DULoxetine (CYMBALTA) 30 MG capsule, Take 30 mg by mouth every night at bedtime., Disp: , Rfl: 0  •  enalapril (VASOTEC) 5 MG tablet, Take 1 tablet by mouth Daily., Disp: 90 tablet, Rfl: 1  •  pregabalin (LYRICA) 75 MG capsule, Take 75 mg by mouth 2 (Two) Times a Day., Disp: , Rfl: 0  No current facility-administered medications for this visit.     Facility-Administered Medications Ordered in Other Visits:   •  mupirocin (BACTROBAN) 2 % nasal ointment, , Nasal, BID, Andrew Avila MD     Objective     History of Present Illness     Review of Systems   Constitutional: Negative.    HENT: Negative.    Eyes: Negative.    Respiratory: Negative.    Cardiovascular: Positive for leg swelling.   Gastrointestinal: Negative.    Genitourinary: Negative.    Musculoskeletal: Positive for arthralgias and joint swelling.   Skin: Negative.    Neurological: Negative.    Psychiatric/Behavioral: Negative.        Physical Exam   Constitutional: He appears well-developed and well-nourished.   Pleasant, cooperative   HENT:   Head: Normocephalic and atraumatic.   Cardiovascular: Normal rate, regular rhythm and normal heart sounds.   Pulmonary/Chest: Effort normal and breath sounds normal. No respiratory distress. He has no wheezes. He has no rales.   Musculoskeletal: He exhibits edema and  tenderness.   Patient has some swelling in the feet but is not terribly pronounced currently but according to the patient gets worse.   Skin: Skin is warm and dry.   Venous stasis changes in the lower legs   Psychiatric: He has a normal mood and affect. His behavior is normal.   Nursing note and vitals reviewed.      ASSESSMENT by history, complex regional pain syndrome in the lower feet involving significant swelling and discomfort.     Problem List Items Addressed This Visit        Nervous and Auditory    Complex regional pain syndrome i of left lower limb - Primary    Relevant Orders    Ambulatory Referral to Orthopedic Surgery          PLAN I would like to get the patient either with another pain specialist who might be able to do injections in the lower legs and feet or with 1 of the foot specialists and will see who we can arrange with first.    There are no Patient Instructions on file for this visit.  No Follow-up on file.

## 2019-11-01 DIAGNOSIS — G57.51: Primary | ICD-10-CM

## 2019-11-01 DIAGNOSIS — G90.522 COMPLEX REGIONAL PAIN SYNDROME I OF LEFT LOWER LIMB: ICD-10-CM

## 2019-11-01 DIAGNOSIS — G90.50 COMPLEX REGIONAL PAIN SYNDROME TYPE 1, AFFECTING UNSPECIFIED SITE: ICD-10-CM

## 2019-11-01 DIAGNOSIS — G57.52 POSTERIOR TIBIAL NEUROPATHY, LEFT: ICD-10-CM

## 2019-11-20 ENCOUNTER — OFFICE VISIT (OUTPATIENT)
Dept: FAMILY MEDICINE CLINIC | Facility: CLINIC | Age: 54
End: 2019-11-20

## 2019-11-20 VITALS
WEIGHT: 236 LBS | DIASTOLIC BLOOD PRESSURE: 74 MMHG | BODY MASS INDEX: 29.34 KG/M2 | TEMPERATURE: 98.9 F | HEIGHT: 75 IN | OXYGEN SATURATION: 98 % | SYSTOLIC BLOOD PRESSURE: 122 MMHG | HEART RATE: 89 BPM

## 2019-11-20 DIAGNOSIS — J40 BRONCHITIS: ICD-10-CM

## 2019-11-20 DIAGNOSIS — J01.40 ACUTE PANSINUSITIS, RECURRENCE NOT SPECIFIED: Primary | ICD-10-CM

## 2019-11-20 PROCEDURE — 99213 OFFICE O/P EST LOW 20 MIN: CPT | Performed by: NURSE PRACTITIONER

## 2019-11-20 RX ORDER — METHYLPREDNISOLONE 4 MG/1
TABLET ORAL
Qty: 1 EACH | Refills: 0 | Status: SHIPPED | OUTPATIENT
Start: 2019-11-20 | End: 2020-05-05

## 2019-11-20 RX ORDER — AMOXICILLIN AND CLAVULANATE POTASSIUM 875; 125 MG/1; MG/1
1 TABLET, FILM COATED ORAL 2 TIMES DAILY
Qty: 14 TABLET | Refills: 0 | Status: SHIPPED | OUTPATIENT
Start: 2019-11-20 | End: 2019-11-27

## 2019-11-20 RX ORDER — BENZONATATE 100 MG/1
100 CAPSULE ORAL 2 TIMES DAILY PRN
Qty: 20 CAPSULE | Refills: 0 | Status: SHIPPED | OUTPATIENT
Start: 2019-11-20 | End: 2020-05-05

## 2019-11-20 RX ORDER — ALBUTEROL SULFATE 90 UG/1
2 AEROSOL, METERED RESPIRATORY (INHALATION) EVERY 4 HOURS PRN
Qty: 1 INHALER | Refills: 0 | Status: SHIPPED | OUTPATIENT
Start: 2019-11-20 | End: 2019-12-16 | Stop reason: SDUPTHER

## 2019-11-20 NOTE — PROGRESS NOTES
Subjective     Zachary Gerard is a 54 y.o.. male.     URI    This is a new problem. Episode onset: 2-3 days. The problem has been gradually worsening. There has been no fever. Associated symptoms include congestion, coughing (productive) and rhinorrhea. Pertinent negatives include no diarrhea, ear pain, headaches, nausea, sore throat or vomiting. He has tried nothing for the symptoms.       The following portions of the patient's history were reviewed and updated as appropriate: allergies, current medications, past family history, past medical history, past social history, past surgical history and problem list.    Past Medical History:   Diagnosis Date   • Adverse effect of anesthesia     PATIENT STATES WAKES UP ANGRY & COMBATIVE   • Arthritis    • CTS (carpal tunnel syndrome)    • Diverticulosis    • Gout    • Gout    • H/O Right leg pain 2011   • Hiatal hernia    • Hypertension    • IBS (irritable bowel syndrome)    • Osteonecrosis (CMS/HCC)    • Osteoporosis    • Pulmonary emboli (CMS/HCC)        Past Surgical History:   Procedure Laterality Date   • EXTENSOR TENDON OF FOREARM / WRIST REPAIR Bilateral    • FOOT FRACTURE SURGERY     • FOOT NEUROMA SURGERY Right 2003    Mccabe's neuroma   • HERNIA REPAIR      BILATERAL INGUINAL   • OTHER SURGICAL HISTORY Right 2007    Elbow surgery   • TOTAL HIP ARTHROPLASTY Left 3/22/2019    Procedure: TOTAL HIP ARTHROPLASTY;  Surgeon: Andrew Avila MD;  Location: [facility] OR;  Service: Orthopedics   • UMBILICAL HERNIA REPAIR N/A 1/16/2017    Procedure: UMBILICAL HERNIA REPAIR;  Surgeon: Fernando Montes Jr., MD;  Location: [facility] OR;  Service:        Review of Systems   Constitutional: Negative for fever.   HENT: Positive for congestion, rhinorrhea and voice change. Negative for ear pain, postnasal drip and sore throat.    Respiratory: Positive for cough (productive).    Gastrointestinal: Negative for diarrhea, nausea and vomiting.   Neurological: Negative for headaches.  "      No Known Allergies    Objective     Vitals:    11/20/19 1135 11/20/19 1146   BP: 122/74    Pulse: 89    Temp: 98.9 °F (37.2 °C)    SpO2: 92% 98%   Weight: 107 kg (236 lb)    Height: 190.5 cm (75\")      Body mass index is 29.5 kg/m².    Physical Exam   Constitutional: He is oriented to person, place, and time. He appears well-developed and well-nourished.   HENT:   Head: Normocephalic and atraumatic.   Right Ear: Tympanic membrane is not erythematous. A middle ear effusion (clear) is present.   Left Ear: Tympanic membrane is not erythematous. A middle ear effusion (clear) is present.   Nose: Mucosal edema present. Right sinus exhibits maxillary sinus tenderness and frontal sinus tenderness. Left sinus exhibits maxillary sinus tenderness and frontal sinus tenderness.   Mouth/Throat: Oropharynx is clear and moist. Oropharyngeal exudate: pnd, thick.   Eyes: Conjunctivae are normal. Pupils are equal, round, and reactive to light.   Cardiovascular: Normal rate and regular rhythm.   No murmur heard.  Pulmonary/Chest: Effort normal. No accessory muscle usage. No respiratory distress. He has no decreased breath sounds. He has wheezes in the right upper field and the left upper field. He has rhonchi in the right upper field and the left upper field. He has no rales.   Musculoskeletal: Normal range of motion.   Lymphadenopathy:     He has cervical adenopathy (shotty).   Neurological: He is alert and oriented to person, place, and time.   Skin: Skin is warm and dry.   Vitals reviewed.        Current Outpatient Medications:   •  allopurinol (ZYLOPRIM) 300 MG tablet, Take 1 tablet by mouth Daily., Disp: 90 tablet, Rfl: 1  •  amitriptyline (ELAVIL) 100 MG tablet, Take 1 tablet by mouth At Night As Needed for Sleep., Disp: 90 tablet, Rfl: 1  •  amitriptyline (ELAVIL) 25 MG tablet, Take 2 tablets by mouth Every Night., Disp: 180 tablet, Rfl: 1  •  aspirin 81 MG tablet, Take 1 tablet by mouth Daily., Disp: , Rfl:   •  " DULoxetine (CYMBALTA) 30 MG capsule, Take 30 mg by mouth every night at bedtime., Disp: , Rfl: 0  •  enalapril (VASOTEC) 5 MG tablet, Take 1 tablet by mouth Daily., Disp: 90 tablet, Rfl: 1  •  pregabalin (LYRICA) 75 MG capsule, Take 75 mg by mouth 2 (Two) Times a Day., Disp: , Rfl: 0  •  albuterol sulfate  (90 Base) MCG/ACT inhaler, Inhale 2 puffs Every 4 (Four) Hours As Needed for Wheezing or Shortness of Air (coughing episodes)., Disp: 1 inhaler, Rfl: 0  •  amoxicillin-clavulanate (AUGMENTIN) 875-125 MG per tablet, Take 1 tablet by mouth 2 (Two) Times a Day for 7 days., Disp: 14 tablet, Rfl: 0  •  benzonatate (TESSALON PERLES) 100 MG capsule, Take 1 capsule by mouth 2 (Two) Times a Day As Needed for Cough., Disp: 20 capsule, Rfl: 0  •  methylPREDNISolone (MEDROL, MEHNAZ,) 4 MG tablet, Take as directed on package instructions., Disp: 1 each, Rfl: 0  No current facility-administered medications for this visit.     Facility-Administered Medications Ordered in Other Visits:   •  mupirocin (BACTROBAN) 2 % nasal ointment, , Nasal, BID, Andrew Avila MD      Assessment/Plan   Zachary was seen today for uri.    Diagnoses and all orders for this visit:    Acute pansinusitis, recurrence not specified  -     amoxicillin-clavulanate (AUGMENTIN) 875-125 MG per tablet; Take 1 tablet by mouth 2 (Two) Times a Day for 7 days.    Bronchitis  -     methylPREDNISolone (MEDROL, MEHNAZ,) 4 MG tablet; Take as directed on package instructions.  -     albuterol sulfate  (90 Base) MCG/ACT inhaler; Inhale 2 puffs Every 4 (Four) Hours As Needed for Wheezing or Shortness of Air (coughing episodes).  -     benzonatate (TESSALON PERLES) 100 MG capsule; Take 1 capsule by mouth 2 (Two) Times a Day As Needed for Cough.        Patient Instructions   Drink plenty of fluids  May use otc mucinex (no additives)   Encouraged pt to stop smoking      Return if symptoms worsen or fail to improve.

## 2019-12-16 ENCOUNTER — OFFICE VISIT (OUTPATIENT)
Dept: FAMILY MEDICINE CLINIC | Facility: CLINIC | Age: 54
End: 2019-12-16

## 2019-12-16 VITALS
OXYGEN SATURATION: 95 % | BODY MASS INDEX: 29.47 KG/M2 | HEART RATE: 83 BPM | SYSTOLIC BLOOD PRESSURE: 132 MMHG | WEIGHT: 237 LBS | TEMPERATURE: 98.1 F | HEIGHT: 75 IN | DIASTOLIC BLOOD PRESSURE: 76 MMHG

## 2019-12-16 DIAGNOSIS — J40 BRONCHITIS: Primary | ICD-10-CM

## 2019-12-16 PROCEDURE — 99213 OFFICE O/P EST LOW 20 MIN: CPT | Performed by: NURSE PRACTITIONER

## 2019-12-16 RX ORDER — PREGABALIN 150 MG/1
150 CAPSULE ORAL 2 TIMES DAILY
Refills: 0 | COMMUNITY
Start: 2019-12-13 | End: 2021-10-05

## 2019-12-16 RX ORDER — ALBUTEROL SULFATE 90 UG/1
2 AEROSOL, METERED RESPIRATORY (INHALATION) EVERY 4 HOURS PRN
Qty: 1 INHALER | Refills: 0 | Status: SHIPPED | OUTPATIENT
Start: 2019-12-16 | End: 2020-11-04

## 2019-12-16 RX ORDER — BUDESONIDE AND FORMOTEROL FUMARATE DIHYDRATE 80; 4.5 UG/1; UG/1
2 AEROSOL RESPIRATORY (INHALATION)
Qty: 1 INHALER | Refills: 0 | Status: SHIPPED | OUTPATIENT
Start: 2019-12-16 | End: 2020-11-04

## 2019-12-16 RX ORDER — BENZONATATE 100 MG/1
100 CAPSULE ORAL 2 TIMES DAILY PRN
Qty: 20 CAPSULE | Refills: 0 | Status: SHIPPED | OUTPATIENT
Start: 2019-12-16 | End: 2020-01-05

## 2019-12-16 NOTE — PROGRESS NOTES
Subjective     Zachary Gerard is a 54 y.o.. male.     URI    This is a recurrent problem. Episode onset: for one month. The problem has been waxing and waning. There has been no fever. Associated symptoms include coughing. Pertinent negatives include no congestion, diarrhea, ear pain, headaches, nausea, rhinorrhea, sore throat or vomiting.     The following portions of the patient's history were reviewed and updated as appropriate: allergies, current medications, past family history, past medical history, past social history, past surgical history and problem list.    Past Medical History:   Diagnosis Date   • Adverse effect of anesthesia     PATIENT STATES WAKES UP ANGRY & COMBATIVE   • Arthritis    • CTS (carpal tunnel syndrome)    • Diverticulosis    • Gout    • Gout    • H/O Right leg pain 2011   • Hiatal hernia    • Hypertension    • IBS (irritable bowel syndrome)    • Osteonecrosis (CMS/HCC)    • Osteoporosis    • Pulmonary emboli (CMS/HCC)        Past Surgical History:   Procedure Laterality Date   • EXTENSOR TENDON OF FOREARM / WRIST REPAIR Bilateral    • FOOT FRACTURE SURGERY     • FOOT NEUROMA SURGERY Right 2003    Mccabe's neuroma   • HERNIA REPAIR      BILATERAL INGUINAL   • OTHER SURGICAL HISTORY Right 2007    Elbow surgery   • TOTAL HIP ARTHROPLASTY Left 3/22/2019    Procedure: TOTAL HIP ARTHROPLASTY;  Surgeon: Andrew Avila MD;  Location: [facility];  Service: Orthopedics   • UMBILICAL HERNIA REPAIR N/A 1/16/2017    Procedure: UMBILICAL HERNIA REPAIR;  Surgeon: Fernando Montes Jr., MD;  Location: [facility];  Service:        Review of Systems   Constitutional: Negative for fatigue and fever.   HENT: Negative for congestion, ear pain, rhinorrhea and sore throat.    Respiratory: Positive for cough.    Gastrointestinal: Negative for diarrhea, nausea and vomiting.   Neurological: Negative for headaches.       No Known Allergies    Objective     Vitals:    12/16/19 1401   BP: 132/76   Pulse: 83  "  Temp: 98.1 °F (36.7 °C)   SpO2: 95%   Weight: 108 kg (237 lb)   Height: 190.5 cm (75\")     Body mass index is 29.62 kg/m².    Physical Exam   Constitutional: He is oriented to person, place, and time. He appears well-developed and well-nourished.   HENT:   Head: Normocephalic and atraumatic.   Right Ear: Tympanic membrane normal. Tympanic membrane is not erythematous.   Left Ear: Tympanic membrane normal. Tympanic membrane is not erythematous.   Nose: Nose normal. Right sinus exhibits no maxillary sinus tenderness and no frontal sinus tenderness. Left sinus exhibits no maxillary sinus tenderness and no frontal sinus tenderness.   Mouth/Throat: Oropharynx is clear and moist. Oropharyngeal exudate: pnd.   Eyes: Pupils are equal, round, and reactive to light. Conjunctivae are normal.   Cardiovascular: Normal rate and regular rhythm.   No murmur heard.  Pulmonary/Chest: Effort normal. No accessory muscle usage or stridor. No respiratory distress. He has no decreased breath sounds. He has wheezes in the right upper field and the left upper field. He has rhonchi in the right upper field and the left upper field. He has no rales.   Musculoskeletal: Normal range of motion.   Lymphadenopathy:     He has no cervical adenopathy.   Neurological: He is alert and oriented to person, place, and time.   Skin: Skin is warm and dry.   Vitals reviewed.        Current Outpatient Medications:   •  albuterol sulfate  (90 Base) MCG/ACT inhaler, Inhale 2 puffs Every 4 (Four) Hours As Needed for Wheezing or Shortness of Air (coughing episodes)., Disp: 1 inhaler, Rfl: 0  •  allopurinol (ZYLOPRIM) 300 MG tablet, Take 1 tablet by mouth Daily., Disp: 90 tablet, Rfl: 1  •  amitriptyline (ELAVIL) 100 MG tablet, Take 1 tablet by mouth At Night As Needed for Sleep., Disp: 90 tablet, Rfl: 1  •  amitriptyline (ELAVIL) 25 MG tablet, Take 2 tablets by mouth Every Night., Disp: 180 tablet, Rfl: 1  •  aspirin 81 MG tablet, Take 1 tablet by mouth " Daily., Disp: , Rfl:   •  benzonatate (TESSALON PERLES) 100 MG capsule, Take 1 capsule by mouth 2 (Two) Times a Day As Needed for Cough., Disp: 20 capsule, Rfl: 0  •  benzonatate (TESSALON PERLES) 100 MG capsule, Take 1 capsule by mouth 2 (Two) Times a Day As Needed for Cough for up to 20 days., Disp: 20 capsule, Rfl: 0  •  budesonide-formoterol (SYMBICORT) 80-4.5 MCG/ACT inhaler, Inhale 2 puffs 2 (Two) Times a Day., Disp: 1 inhaler, Rfl: 0  •  DULoxetine (CYMBALTA) 30 MG capsule, Take 30 mg by mouth every night at bedtime., Disp: , Rfl: 0  •  enalapril (VASOTEC) 5 MG tablet, Take 1 tablet by mouth Daily., Disp: 90 tablet, Rfl: 1  •  methylPREDNISolone (MEDROL, MEHNAZ,) 4 MG tablet, Take as directed on package instructions., Disp: 1 each, Rfl: 0  •  pregabalin (LYRICA) 150 MG capsule, Take 150 mg by mouth 2 (Two) Times a Day., Disp: , Rfl: 0  •  pregabalin (LYRICA) 75 MG capsule, Take 75 mg by mouth 2 (Two) Times a Day., Disp: , Rfl: 0  No current facility-administered medications for this visit.     Facility-Administered Medications Ordered in Other Visits:   •  mupirocin (BACTROBAN) 2 % nasal ointment, , Nasal, BID, Andrew Avila MD    Assessment/Plan   Zachary was seen today for uri.    Diagnoses and all orders for this visit:    Bronchitis  -     budesonide-formoterol (SYMBICORT) 80-4.5 MCG/ACT inhaler; Inhale 2 puffs 2 (Two) Times a Day.  -     albuterol sulfate  (90 Base) MCG/ACT inhaler; Inhale 2 puffs Every 4 (Four) Hours As Needed for Wheezing or Shortness of Air (coughing episodes).  -     benzonatate (TESSALON PERLES) 100 MG capsule; Take 1 capsule by mouth 2 (Two) Times a Day As Needed for Cough for up to 20 days.        Patient Instructions   Drink plenty of fluids, rest, avoid dairy, warm salt water gargles    Discussed smoking cessation again with pt (discussed at last office visit); discussed adverse effects of smoking. Pt verb. Understanding.    Return if symptoms worsen or fail to improve.

## 2020-01-07 ENCOUNTER — APPOINTMENT (OUTPATIENT)
Dept: WOUND CARE | Facility: HOSPITAL | Age: 55
End: 2020-01-07

## 2020-01-28 ENCOUNTER — OFFICE VISIT (OUTPATIENT)
Dept: WOUND CARE | Facility: HOSPITAL | Age: 55
End: 2020-01-28

## 2020-03-10 ENCOUNTER — APPOINTMENT (OUTPATIENT)
Dept: WOUND CARE | Facility: HOSPITAL | Age: 55
End: 2020-03-10

## 2020-05-05 ENCOUNTER — OFFICE VISIT (OUTPATIENT)
Dept: ORTHOPEDIC SURGERY | Facility: CLINIC | Age: 55
End: 2020-05-05

## 2020-05-05 VITALS — HEIGHT: 75 IN | BODY MASS INDEX: 28.23 KG/M2 | TEMPERATURE: 97.7 F | WEIGHT: 227 LBS

## 2020-05-05 DIAGNOSIS — Z96.642 STATUS POST TOTAL REPLACEMENT OF LEFT HIP: Primary | ICD-10-CM

## 2020-05-05 PROCEDURE — 73502 X-RAY EXAM HIP UNI 2-3 VIEWS: CPT | Performed by: ORTHOPAEDIC SURGERY

## 2020-05-05 PROCEDURE — 99213 OFFICE O/P EST LOW 20 MIN: CPT | Performed by: ORTHOPAEDIC SURGERY

## 2020-05-05 NOTE — PROGRESS NOTES
Patient: Zachary Gerard  YOB: 1965 55 y.o. male  Medical Record Number: [id number]    Chief Complaint:   Chief Complaint   Patient presents with   • Left Hip - Follow-up, Pain       History of Present Illness:Zachary Gerard is a 55 y.o. male who presents for follow-up of left hip.  He is now about a year out from left total hip was doing fine he slipped on the floor a week ago fell with a direct blow to the posterior aspect of the left hip.  He complains of a constant aching pain about the posterior left hip with weightbearing or activity.  He denies any groin or anterior thigh pain.    Allergies: No Known Allergies    Medications:   Current Outpatient Medications   Medication Sig Dispense Refill   • albuterol sulfate  (90 Base) MCG/ACT inhaler Inhale 2 puffs Every 4 (Four) Hours As Needed for Wheezing or Shortness of Air (coughing episodes). 1 inhaler 0   • allopurinol (ZYLOPRIM) 300 MG tablet Take 1 tablet by mouth Daily. 90 tablet 1   • amitriptyline (ELAVIL) 100 MG tablet Take 1 tablet by mouth At Night As Needed for Sleep. 90 tablet 1   • amitriptyline (ELAVIL) 25 MG tablet Take 2 tablets by mouth Every Night. 180 tablet 1   • aspirin 81 MG tablet Take 1 tablet by mouth Daily.     • budesonide-formoterol (SYMBICORT) 80-4.5 MCG/ACT inhaler Inhale 2 puffs 2 (Two) Times a Day. 1 inhaler 0   • DULoxetine (CYMBALTA) 30 MG capsule Take 30 mg by mouth every night at bedtime.  0   • enalapril (VASOTEC) 5 MG tablet Take 1 tablet by mouth Daily. 90 tablet 1   • pregabalin (LYRICA) 150 MG capsule Take 150 mg by mouth 2 (Two) Times a Day.  0   • pregabalin (LYRICA) 75 MG capsule Take 75 mg by mouth 2 (Two) Times a Day.  0     No current facility-administered medications for this visit.      Facility-Administered Medications Ordered in Other Visits   Medication Dose Route Frequency Provider Last Rate Last Dose   • mupirocin (BACTROBAN) 2 % nasal ointment   Nasal BID Andrew Avila MD             The  "following portions of the patient's history were reviewed and updated as appropriate: allergies, current medications, past family history, past medical history, past social history, past surgical history and problem list.    Review of Systems:   A 14 point review of systems was performed. All systems negative except pertinent positives/negative listed in HPI above    Physical Exam:   Vitals:    05/05/20 1201   Temp: 97.7 °F (36.5 °C)   Weight: 103 kg (227 lb)   Height: 190.5 cm (75\")   PainSc:   6       General: A and O x 3, ASA, NAD    SCLERA:    Normal    DENTITION:   Normal  Hip:  left    LEG ALIGNMENT:     Neutral   ,    equal leg lengths    GAIT:     Nonantalgic    SKIN:     No abnormality    RANGE OF MOTION:      Full without joint irritability    STRENGTH:     5 / 5    hip flexion and abduction    DISTAL PULSES:    Paplable    DISTAL SENSATION :   Intact    LYMPHATICS:     No   lymphadenopathy    OTHER:          - Negative Stinchfeld test      - Negative log roll      - No Tenderness to palpation trochanteric bursa      - Neg FADIR      - Neg APPLE      - No SI tenderness   There is moderate tenderness to palpation about the left ischio tuberosity    Radiology:    Xrays 2views left hip (AP bilateral hips and lateral hip) were ordered and reviewed for evaluation of hip pain demonstrating no abnormality of his hip replacement is well-positioned and well aligned without complicating factors noted.  The bone around the issue him is normal in appearance.  In comparison to previous films are no changes.    Assessment/Plan:  Left ischial tuberosity contusion with possible proximal hamstring tearing.  I recommended hamstring stretching.  I see no problems are related to his hip replacement.  If he still having pain in a month he can return repeat x-rays possible injection into the ischial tuberosity region.      Andrew Avila MD  5/5/2020  "

## 2020-05-11 RX ORDER — ALLOPURINOL 300 MG/1
TABLET ORAL
Qty: 90 TABLET | Refills: 1 | Status: SHIPPED | OUTPATIENT
Start: 2020-05-11 | End: 2020-11-13 | Stop reason: SDUPTHER

## 2020-05-19 RX ORDER — AMITRIPTYLINE HYDROCHLORIDE 100 MG/1
100 TABLET, FILM COATED ORAL NIGHTLY PRN
Qty: 90 TABLET | Refills: 1 | Status: SHIPPED | OUTPATIENT
Start: 2020-05-19 | End: 2020-11-04 | Stop reason: SDUPTHER

## 2020-05-19 RX ORDER — ENALAPRIL MALEATE 5 MG/1
5 TABLET ORAL DAILY
Qty: 90 TABLET | Refills: 1 | Status: SHIPPED | OUTPATIENT
Start: 2020-05-19 | End: 2020-08-11

## 2020-06-17 ENCOUNTER — OFFICE VISIT (OUTPATIENT)
Dept: FAMILY MEDICINE CLINIC | Facility: CLINIC | Age: 55
End: 2020-06-17

## 2020-06-17 VITALS
WEIGHT: 233.4 LBS | BODY MASS INDEX: 29.02 KG/M2 | OXYGEN SATURATION: 98 % | HEIGHT: 75 IN | DIASTOLIC BLOOD PRESSURE: 82 MMHG | TEMPERATURE: 98.2 F | SYSTOLIC BLOOD PRESSURE: 124 MMHG | HEART RATE: 98 BPM

## 2020-06-17 DIAGNOSIS — I10 ESSENTIAL HYPERTENSION: ICD-10-CM

## 2020-06-17 DIAGNOSIS — F41.9 ANXIETY: Primary | ICD-10-CM

## 2020-06-17 DIAGNOSIS — Z12.5 PROSTATE CANCER SCREENING: ICD-10-CM

## 2020-06-17 DIAGNOSIS — E55.9 VITAMIN D DEFICIENCY: ICD-10-CM

## 2020-06-17 PROCEDURE — 99213 OFFICE O/P EST LOW 20 MIN: CPT | Performed by: NURSE PRACTITIONER

## 2020-06-17 RX ORDER — VENLAFAXINE HYDROCHLORIDE 37.5 MG/1
37.5 CAPSULE, EXTENDED RELEASE ORAL DAILY
Qty: 90 CAPSULE | Refills: 0 | Status: SHIPPED | OUTPATIENT
Start: 2020-06-17 | End: 2020-08-11

## 2020-06-17 NOTE — PATIENT INSTRUCTIONS
Anxiety: monitor stress, try to find ways to help relaxation; Discussed effexor XR, how to take, what it does, potential side effects; will check labs today and follow up in 3 months. Pt informed to call/rto sooner if needed.

## 2020-06-17 NOTE — PROGRESS NOTES
Subjective     Zachary Gerard is a 55 y.o.. male.     Pt stating people are telling him he has mood swings. Pt stating he and his wife are having marital issues. Pt stating he has stress from his job. Pt denies issues/concerns with depression.    Anxiety   Symptoms include excessive worry, irritability and nervous/anxious behavior. Patient reports no chest pain, decreased concentration, depressed mood, dizziness, dry mouth, hyperventilation, muscle tension, nausea, palpitations, panic or suicidal ideas. Symptoms occur constantly.       The following portions of the patient's history were reviewed and updated as appropriate: allergies, current medications, past family history, past medical history, past social history, past surgical history and problem list.    Past Medical History:   Diagnosis Date   • Adverse effect of anesthesia     PATIENT STATES WAKES UP ANGRY & COMBATIVE   • Arthritis    • CTS (carpal tunnel syndrome)    • Diverticulosis    • Gout    • Gout    • H/O Right leg pain 2011   • Hiatal hernia    • Hypertension    • IBS (irritable bowel syndrome)    • Osteonecrosis (CMS/HCC)    • Osteoporosis    • Pulmonary emboli (CMS/HCC)        Past Surgical History:   Procedure Laterality Date   • EXTENSOR TENDON OF FOREARM / WRIST REPAIR Bilateral    • FOOT FRACTURE SURGERY     • FOOT NEUROMA SURGERY Right 2003    Mccabe's neuroma   • HERNIA REPAIR      BILATERAL INGUINAL   • OTHER SURGICAL HISTORY Right 2007    Elbow surgery   • TOTAL HIP ARTHROPLASTY Left 3/22/2019    Procedure: TOTAL HIP ARTHROPLASTY;  Surgeon: Andrew Avila MD;  Location: [facility];  Service: Orthopedics   • UMBILICAL HERNIA REPAIR N/A 1/16/2017    Procedure: UMBILICAL HERNIA REPAIR;  Surgeon: Fernando Montes Jr., MD;  Location: [facility] OR;  Service:        Review of Systems   Constitutional: Positive for appetite change and irritability. Negative for fatigue and fever.   Respiratory: Negative.    Cardiovascular: Negative.  Negative  "for chest pain and palpitations.   Gastrointestinal: Negative.  Negative for diarrhea, nausea and vomiting.   Neurological: Negative.  Negative for dizziness and headaches.   Psychiatric/Behavioral: Positive for sleep disturbance. Negative for decreased concentration, dysphoric mood, self-injury and suicidal ideas. The patient is nervous/anxious and is hyperactive.        No Known Allergies    Objective     Vitals:    06/17/20 1310 06/17/20 1318   BP: 124/82    Pulse: 98    Temp: 98.2 °F (36.8 °C)    SpO2: 98% 98%   Weight: 106 kg (233 lb 6.4 oz)    Height: 190.5 cm (75\")      Body mass index is 29.17 kg/m².    Physical Exam   Constitutional: He is oriented to person, place, and time. He appears well-developed.   HENT:   Head: Normocephalic.   Eyes: Pupils are equal, round, and reactive to light.   Cardiovascular: Normal rate and regular rhythm.   Pulmonary/Chest: Effort normal and breath sounds normal.   Musculoskeletal: Normal range of motion.   Neurological: He is alert and oriented to person, place, and time.   Vitals reviewed.        Current Outpatient Medications:   •  allopurinol (ZYLOPRIM) 300 MG tablet, TAKE ONE TABLET BY MOUTH EVERY DAY, Disp: 90 tablet, Rfl: 1  •  amitriptyline (ELAVIL) 100 MG tablet, Take 1 tablet by mouth At Night As Needed for Sleep., Disp: 90 tablet, Rfl: 1  •  amitriptyline (ELAVIL) 25 MG tablet, Take 2 tablets by mouth Every Night., Disp: 180 tablet, Rfl: 1  •  aspirin 81 MG tablet, Take 1 tablet by mouth Daily., Disp: , Rfl:   •  enalapril (VASOTEC) 5 MG tablet, Take 1 tablet by mouth Daily., Disp: 90 tablet, Rfl: 1  •  pregabalin (LYRICA) 150 MG capsule, Take 150 mg by mouth 2 (Two) Times a Day., Disp: , Rfl: 0  •  albuterol sulfate  (90 Base) MCG/ACT inhaler, Inhale 2 puffs Every 4 (Four) Hours As Needed for Wheezing or Shortness of Air (coughing episodes)., Disp: 1 inhaler, Rfl: 0  •  budesonide-formoterol (SYMBICORT) 80-4.5 MCG/ACT inhaler, Inhale 2 puffs 2 (Two) Times a " Day., Disp: 1 inhaler, Rfl: 0  •  pregabalin (LYRICA) 75 MG capsule, Take 75 mg by mouth 2 (Two) Times a Day., Disp: , Rfl: 0  •  venlafaxine XR (Effexor XR) 37.5 MG 24 hr capsule, Take 1 capsule by mouth Daily., Disp: 90 capsule, Rfl: 0  No current facility-administered medications for this visit.     Facility-Administered Medications Ordered in Other Visits:   •  mupirocin (BACTROBAN) 2 % nasal ointment, , Nasal, BID, Andrew Avila MD      Assessment/Plan   Zachary was seen today for anxiety.    Diagnoses and all orders for this visit:    Anxiety  -     venlafaxine XR (Effexor XR) 37.5 MG 24 hr capsule; Take 1 capsule by mouth Daily.  -     TSH    Vitamin D deficiency  -     Vitamin D 25 Hydroxy    Prostate cancer screening  -     PSA Screen    Essential hypertension  -     CBC & Differential  -     Comprehensive metabolic panel  -     Lipid panel        Patient Instructions   Anxiety: monitor stress, try to find ways to help relaxation; Discussed effexor XR, how to take, what it does, potential side effects; will check labs today and follow up in 3 months. Pt informed to call/rto sooner if needed.         Return for  follow up in 3 months .

## 2020-06-18 ENCOUNTER — TELEPHONE (OUTPATIENT)
Dept: FAMILY MEDICINE CLINIC | Facility: CLINIC | Age: 55
End: 2020-06-18

## 2020-06-18 DIAGNOSIS — E55.9 VITAMIN D DEFICIENCY: Primary | ICD-10-CM

## 2020-06-18 DIAGNOSIS — E78.1 HYPERTRIGLYCERIDEMIA: ICD-10-CM

## 2020-06-18 LAB
25(OH)D3+25(OH)D2 SERPL-MCNC: 29.1 NG/ML (ref 30–100)
ALBUMIN SERPL-MCNC: 4.3 G/DL (ref 3.5–5.2)
ALBUMIN/GLOB SERPL: 2 G/DL
ALP SERPL-CCNC: 103 U/L (ref 39–117)
ALT SERPL-CCNC: 13 U/L (ref 1–41)
AST SERPL-CCNC: 17 U/L (ref 1–40)
BASOPHILS # BLD AUTO: 0.01 10*3/MM3 (ref 0–0.2)
BASOPHILS NFR BLD AUTO: 0.2 % (ref 0–1.5)
BILIRUB SERPL-MCNC: 0.4 MG/DL (ref 0.2–1.2)
BUN SERPL-MCNC: 10 MG/DL (ref 6–20)
BUN/CREAT SERPL: 13 (ref 7–25)
CALCIUM SERPL-MCNC: 9 MG/DL (ref 8.6–10.5)
CHLORIDE SERPL-SCNC: 102 MMOL/L (ref 98–107)
CHOLEST SERPL-MCNC: 143 MG/DL (ref 0–200)
CO2 SERPL-SCNC: 27.4 MMOL/L (ref 22–29)
CREAT SERPL-MCNC: 0.77 MG/DL (ref 0.76–1.27)
EOSINOPHIL # BLD AUTO: 0.07 10*3/MM3 (ref 0–0.4)
EOSINOPHIL NFR BLD AUTO: 1.2 % (ref 0.3–6.2)
ERYTHROCYTE [DISTWIDTH] IN BLOOD BY AUTOMATED COUNT: 12.8 % (ref 12.3–15.4)
GLOBULIN SER CALC-MCNC: 2.1 GM/DL
GLUCOSE SERPL-MCNC: 105 MG/DL (ref 65–99)
HCT VFR BLD AUTO: 46 % (ref 37.5–51)
HDLC SERPL-MCNC: 43 MG/DL (ref 40–60)
HGB BLD-MCNC: 15.9 G/DL (ref 13–17.7)
IMM GRANULOCYTES # BLD AUTO: 0.01 10*3/MM3 (ref 0–0.05)
IMM GRANULOCYTES NFR BLD AUTO: 0.2 % (ref 0–0.5)
LDLC SERPL CALC-MCNC: 62 MG/DL (ref 0–100)
LYMPHOCYTES # BLD AUTO: 1.67 10*3/MM3 (ref 0.7–3.1)
LYMPHOCYTES NFR BLD AUTO: 29.6 % (ref 19.6–45.3)
MCH RBC QN AUTO: 33.3 PG (ref 26.6–33)
MCHC RBC AUTO-ENTMCNC: 34.6 G/DL (ref 31.5–35.7)
MCV RBC AUTO: 96.2 FL (ref 79–97)
MONOCYTES # BLD AUTO: 0.4 10*3/MM3 (ref 0.1–0.9)
MONOCYTES NFR BLD AUTO: 7.1 % (ref 5–12)
NEUTROPHILS # BLD AUTO: 3.48 10*3/MM3 (ref 1.7–7)
NEUTROPHILS NFR BLD AUTO: 61.7 % (ref 42.7–76)
NRBC BLD AUTO-RTO: 0 /100 WBC (ref 0–0.2)
PLATELET # BLD AUTO: 142 10*3/MM3 (ref 140–450)
POTASSIUM SERPL-SCNC: 4.9 MMOL/L (ref 3.5–5.2)
PROT SERPL-MCNC: 6.4 G/DL (ref 6–8.5)
PSA SERPL-MCNC: 0.32 NG/ML (ref 0–4)
RBC # BLD AUTO: 4.78 10*6/MM3 (ref 4.14–5.8)
SODIUM SERPL-SCNC: 138 MMOL/L (ref 136–145)
TRIGL SERPL-MCNC: 189 MG/DL (ref 0–150)
TSH SERPL DL<=0.005 MIU/L-ACNC: 2.82 UIU/ML (ref 0.27–4.2)
VLDLC SERPL CALC-MCNC: 37.8 MG/DL
WBC # BLD AUTO: 5.64 10*3/MM3 (ref 3.4–10.8)

## 2020-06-18 RX ORDER — ROSUVASTATIN CALCIUM 10 MG/1
10 TABLET, COATED ORAL DAILY
Qty: 90 TABLET | Refills: 1 | Status: SHIPPED | OUTPATIENT
Start: 2020-06-18 | End: 2020-08-11

## 2020-06-18 RX ORDER — ERGOCALCIFEROL 1.25 MG/1
50000 CAPSULE ORAL WEEKLY
Qty: 4 CAPSULE | Refills: 3 | Status: SHIPPED | OUTPATIENT
Start: 2020-06-18 | End: 2020-08-11

## 2020-06-18 NOTE — TELEPHONE ENCOUNTER
Called and spoke with pt regarding lab results, informed of low vit. D level and elevated triglyceride level. Pt informed of need to put on vitamin d supplement and need to put on low dose statin. Pt verb. Understanding. Pt informed he would need to have fasting labs (lipids and vit. D level) done in 3 months when he rto for f/u on anxiety. Pt verb. Understanding.

## 2020-07-14 DIAGNOSIS — F51.04 CHRONIC INSOMNIA: ICD-10-CM

## 2020-07-15 RX ORDER — AMITRIPTYLINE HYDROCHLORIDE 25 MG/1
TABLET, FILM COATED ORAL
Qty: 180 TABLET | Refills: 1 | OUTPATIENT
Start: 2020-07-15

## 2020-08-05 DIAGNOSIS — F51.04 CHRONIC INSOMNIA: ICD-10-CM

## 2020-08-05 RX ORDER — AMITRIPTYLINE HYDROCHLORIDE 25 MG/1
TABLET, FILM COATED ORAL
Qty: 180 TABLET | Refills: 1 | OUTPATIENT
Start: 2020-08-05

## 2020-08-05 RX ORDER — AMITRIPTYLINE HYDROCHLORIDE 25 MG/1
50 TABLET, FILM COATED ORAL NIGHTLY
Qty: 180 TABLET | Refills: 1 | Status: SHIPPED | OUTPATIENT
Start: 2020-08-05 | End: 2020-08-11

## 2020-08-11 ENCOUNTER — OFFICE VISIT (OUTPATIENT)
Dept: FAMILY MEDICINE CLINIC | Facility: CLINIC | Age: 55
End: 2020-08-11

## 2020-08-11 VITALS
HEIGHT: 75 IN | DIASTOLIC BLOOD PRESSURE: 82 MMHG | HEART RATE: 88 BPM | BODY MASS INDEX: 29.05 KG/M2 | TEMPERATURE: 97.7 F | OXYGEN SATURATION: 97 % | WEIGHT: 233.6 LBS | SYSTOLIC BLOOD PRESSURE: 132 MMHG | RESPIRATION RATE: 16 BRPM

## 2020-08-11 DIAGNOSIS — R53.83 FATIGUE, UNSPECIFIED TYPE: ICD-10-CM

## 2020-08-11 DIAGNOSIS — E55.9 VITAMIN D DEFICIENCY: ICD-10-CM

## 2020-08-11 DIAGNOSIS — R68.82 REDUCED LIBIDO: ICD-10-CM

## 2020-08-11 DIAGNOSIS — N62 GYNECOMASTIA: Primary | ICD-10-CM

## 2020-08-11 DIAGNOSIS — F41.9 ANXIETY: ICD-10-CM

## 2020-08-11 DIAGNOSIS — F51.04 CHRONIC INSOMNIA: ICD-10-CM

## 2020-08-11 PROCEDURE — 99213 OFFICE O/P EST LOW 20 MIN: CPT | Performed by: NURSE PRACTITIONER

## 2020-08-11 RX ORDER — VENLAFAXINE HYDROCHLORIDE 37.5 MG/1
37.5 CAPSULE, EXTENDED RELEASE ORAL DAILY
Qty: 90 CAPSULE | Refills: 0 | Status: SHIPPED | OUTPATIENT
Start: 2020-08-11 | End: 2020-11-04

## 2020-08-11 RX ORDER — ROSUVASTATIN CALCIUM 10 MG/1
TABLET, COATED ORAL
COMMUNITY
Start: 2020-07-14 | End: 2020-11-04 | Stop reason: SDUPTHER

## 2020-08-11 RX ORDER — PREGABALIN 150 MG/1
CAPSULE ORAL
COMMUNITY
Start: 2020-07-16 | End: 2020-08-11

## 2020-08-11 RX ORDER — AMITRIPTYLINE HYDROCHLORIDE 25 MG/1
25 TABLET, FILM COATED ORAL NIGHTLY
Qty: 30 TABLET | Refills: 1
Start: 2020-08-11 | End: 2020-11-04

## 2020-08-11 RX ORDER — ENALAPRIL MALEATE 5 MG/1
TABLET ORAL
COMMUNITY
Start: 2020-07-14 | End: 2020-11-04 | Stop reason: SDUPTHER

## 2020-08-11 RX ORDER — ERGOCALCIFEROL 1.25 MG/1
50000 CAPSULE ORAL WEEKLY
Qty: 8 CAPSULE | Refills: 0 | Status: SHIPPED | OUTPATIENT
Start: 2020-08-11 | End: 2020-11-04 | Stop reason: SDUPTHER

## 2020-08-11 RX ORDER — VENLAFAXINE HYDROCHLORIDE 37.5 MG/1
CAPSULE, EXTENDED RELEASE ORAL
COMMUNITY
Start: 2020-07-14 | End: 2020-08-11 | Stop reason: SDUPTHER

## 2020-08-11 RX ORDER — ERGOCALCIFEROL 1.25 MG/1
CAPSULE ORAL
COMMUNITY
Start: 2020-06-18 | End: 2020-08-11 | Stop reason: SDUPTHER

## 2020-08-11 NOTE — PROGRESS NOTES
Subjective     Zachary Gerard is a 55 y.o.. male.     Pt here today with c/o breast enlargement shan. For past year. Pt denies tenderness, redness, nodules and/or nipple discharge. Pt denies eating a lot of soy or sweet potatoes/yams. Pt does admit to feeling fatigue, recent TSH level wnl. Pt with hx of decreased libido. Pt with hx of vitamin d def., admits to only taking vitamin d tab once a month (not once a week like it is prescribed).    The following portions of the patient's history were reviewed and updated as appropriate: allergies, current medications, past family history, past medical history, past social history, past surgical history and problem list.    Past Medical History:   Diagnosis Date   • Adverse effect of anesthesia     PATIENT STATES WAKES UP ANGRY & COMBATIVE   • Arthritis    • CTS (carpal tunnel syndrome)    • Diverticulosis    • Gout    • Gout    • H/O Right leg pain 2011   • Hiatal hernia    • Hypertension    • IBS (irritable bowel syndrome)    • Osteonecrosis (CMS/HCC)    • Osteoporosis    • Pulmonary emboli (CMS/HCC)        Past Surgical History:   Procedure Laterality Date   • EXTENSOR TENDON OF FOREARM / WRIST REPAIR Bilateral    • FOOT FRACTURE SURGERY     • FOOT NEUROMA SURGERY Right 2003    Mccabe's neuroma   • HERNIA REPAIR      BILATERAL INGUINAL   • OTHER SURGICAL HISTORY Right 2007    Elbow surgery   • TOTAL HIP ARTHROPLASTY Left 3/22/2019    Procedure: TOTAL HIP ARTHROPLASTY;  Surgeon: Andrew Avila MD;  Location: [facility];  Service: Orthopedics   • UMBILICAL HERNIA REPAIR N/A 1/16/2017    Procedure: UMBILICAL HERNIA REPAIR;  Surgeon: Fernando Montes Jr., MD;  Location: [facility];  Service:        Review of Systems   Constitutional: Positive for fatigue.   Musculoskeletal:        Gyocamastia shan; non tender; no discharge from nipples, no nodules noted       No Known Allergies    Objective     Vitals:    08/11/20 1255   BP: 132/82   Pulse: 88   Resp: 16   Temp: 97.7 °F  "(36.5 °C)   SpO2: 97%   Weight: 106 kg (233 lb 9.6 oz)   Height: 190.5 cm (75\")     Body mass index is 29.2 kg/m².    Physical Exam   Constitutional: He is oriented to person, place, and time. He appears well-developed.   HENT:   Head: Normocephalic.   Eyes: Pupils are equal, round, and reactive to light.   Cardiovascular: Normal rate and regular rhythm.   Pulmonary/Chest: Effort normal and breath sounds normal. Right breast exhibits no inverted nipple, no mass, no nipple discharge and no tenderness. Left breast exhibits no inverted nipple, no mass, no nipple discharge and no tenderness.   Breast enlargement  Noted shan.   Musculoskeletal: Normal range of motion.   Neurological: He is alert and oriented to person, place, and time.   Vitals reviewed.      Current Outpatient Medications:   •  enalapril (VASOTEC) 5 MG tablet, TAKE ONE TABLET BY MOUTH EVERY DAY, Disp: , Rfl:   •  rosuvastatin (CRESTOR) 10 MG tablet, TAKE ONE TABLET BY MOUTH EVERY DAY, Disp: , Rfl:   •  venlafaxine XR (EFFEXOR-XR) 37.5 MG 24 hr capsule, Take 1 capsule by mouth Daily., Disp: 90 capsule, Rfl: 0  •  vitamin D (ERGOCALCIFEROL) 1.25 MG (49778 UT) capsule capsule, Take 1 capsule by mouth 1 (One) Time Per Week., Disp: 8 capsule, Rfl: 0  •  albuterol sulfate  (90 Base) MCG/ACT inhaler, Inhale 2 puffs Every 4 (Four) Hours As Needed for Wheezing or Shortness of Air (coughing episodes)., Disp: 1 inhaler, Rfl: 0  •  allopurinol (ZYLOPRIM) 300 MG tablet, TAKE ONE TABLET BY MOUTH EVERY DAY, Disp: 90 tablet, Rfl: 1  •  amitriptyline (ELAVIL) 100 MG tablet, Take 1 tablet by mouth At Night As Needed for Sleep., Disp: 90 tablet, Rfl: 1  •  amitriptyline (ELAVIL) 25 MG tablet, Take 1 tablet by mouth Every Night. Decrease from 150 mg to 125 mg at night, Disp: 30 tablet, Rfl: 1  •  aspirin 81 MG tablet, Take 1 tablet by mouth Daily., Disp: , Rfl:   •  budesonide-formoterol (SYMBICORT) 80-4.5 MCG/ACT inhaler, Inhale 2 puffs 2 (Two) Times a Day., Disp: 1 " inhaler, Rfl: 0  •  pregabalin (LYRICA) 150 MG capsule, Take 150 mg by mouth 2 (Two) Times a Day., Disp: , Rfl: 0  No current facility-administered medications for this visit.     Facility-Administered Medications Ordered in Other Visits:   •  mupirocin (BACTROBAN) 2 % nasal ointment, , Nasal, BID, Andrew Avila MD    Recent Results (from the past 2016 hour(s))   CBC & Differential    Collection Time: 06/17/20  1:50 PM   Result Value Ref Range    WBC 5.64 3.40 - 10.80 10*3/mm3    RBC 4.78 4.14 - 5.80 10*6/mm3    Hemoglobin 15.9 13.0 - 17.7 g/dL    Hematocrit 46.0 37.5 - 51.0 %    MCV 96.2 79.0 - 97.0 fL    MCH 33.3 (H) 26.6 - 33.0 pg    MCHC 34.6 31.5 - 35.7 g/dL    RDW 12.8 12.3 - 15.4 %    Platelets 142 140 - 450 10*3/mm3    Neutrophil Rel % 61.7 42.7 - 76.0 %    Lymphocyte Rel % 29.6 19.6 - 45.3 %    Monocyte Rel % 7.1 5.0 - 12.0 %    Eosinophil Rel % 1.2 0.3 - 6.2 %    Basophil Rel % 0.2 0.0 - 1.5 %    Neutrophils Absolute 3.48 1.70 - 7.00 10*3/mm3    Lymphocytes Absolute 1.67 0.70 - 3.10 10*3/mm3    Monocytes Absolute 0.40 0.10 - 0.90 10*3/mm3    Eosinophils Absolute 0.07 0.00 - 0.40 10*3/mm3    Basophils Absolute 0.01 0.00 - 0.20 10*3/mm3    Immature Granulocyte Rel % 0.2 0.0 - 0.5 %    Immature Grans Absolute 0.01 0.00 - 0.05 10*3/mm3    nRBC 0.0 0.0 - 0.2 /100 WBC   Comprehensive metabolic panel    Collection Time: 06/17/20  1:50 PM   Result Value Ref Range    Glucose 105 (H) 65 - 99 mg/dL    BUN 10 6 - 20 mg/dL    Creatinine 0.77 0.76 - 1.27 mg/dL    eGFR Non African Am 104 >60 mL/min/1.73    eGFR African Am 126 >60 mL/min/1.73    BUN/Creatinine Ratio 13.0 7.0 - 25.0    Sodium 138 136 - 145 mmol/L    Potassium 4.9 3.5 - 5.2 mmol/L    Chloride 102 98 - 107 mmol/L    Total CO2 27.4 22.0 - 29.0 mmol/L    Calcium 9.0 8.6 - 10.5 mg/dL    Total Protein 6.4 6.0 - 8.5 g/dL    Albumin 4.30 3.50 - 5.20 g/dL    Globulin 2.1 gm/dL    A/G Ratio 2.0 g/dL    Total Bilirubin 0.4 0.2 - 1.2 mg/dL    Alkaline Phosphatase 103  39 - 117 U/L    AST (SGOT) 17 1 - 40 U/L    ALT (SGPT) 13 1 - 41 U/L   Lipid panel    Collection Time: 06/17/20  1:50 PM   Result Value Ref Range    Total Cholesterol 143 0 - 200 mg/dL    Triglycerides 189 (H) 0 - 150 mg/dL    HDL Cholesterol 43 40 - 60 mg/dL    VLDL Cholesterol 37.8 mg/dL    LDL Cholesterol  62 0 - 100 mg/dL   PSA Screen    Collection Time: 06/17/20  1:50 PM   Result Value Ref Range    PSA 0.317 0.000 - 4.000 ng/mL   TSH    Collection Time: 06/17/20  1:50 PM   Result Value Ref Range    TSH 2.820 0.270 - 4.200 uIU/mL   Vitamin D 25 Hydroxy    Collection Time: 06/17/20  1:50 PM   Result Value Ref Range    25 Hydroxy, Vitamin D 29.1 (L) 30.0 - 100.0 ng/ml       Assessment/Plan   Zachary was seen today for breast problem.    Diagnoses and all orders for this visit:    Gynecomastia  -     Testosterone    Fatigue, unspecified type    Reduced libido    Chronic insomnia  -     amitriptyline (ELAVIL) 25 MG tablet; Take 1 tablet by mouth Every Night. Decrease from 150 mg to 125 mg at night    Anxiety  -     venlafaxine XR (EFFEXOR-XR) 37.5 MG 24 hr capsule; Take 1 capsule by mouth Daily.    Vitamin D deficiency  -     vitamin D (ERGOCALCIFEROL) 1.25 MG (41548 UT) capsule capsule; Take 1 capsule by mouth 1 (One) Time Per Week.        Patient Instructions   Gynecomastia: no nodules noted, ordering testosterone level to further eval.--will call with results, may be due to Amitriptyline, will titrate dose down (decrease to 125 mg at night from 150 mg now; wait two months and if no side effects will decrease down to 100 mg at night for 3 months before reducing further).    Fatigue: TSH wnl from previous labs, will see if testosterone level low; could be due to not taking vitamin d as prescribed--will get pt taking correctly and then re-check vit. D level in October    Decreased libido: could be accumulative effect of amitriptyline, lyrica and Effexor; could be due to decrease testosterone level. Will  monitor    Insomnia: decreasing off amitriptyline, will monitor    Anxiety: pt stating no side effects or adverse effects with Effexor, will continue, may need to increase when getting amitriptyline lowered. Goal of amitriptyline would be 50 mg nightly.     Vitamin D def.: pt to take Ergocalciferol once a week as prescribed and will re-check level in mid October.        Return for follow up in mid october insomnia/anxiety/vit d def.

## 2020-08-11 NOTE — PATIENT INSTRUCTIONS
Gynecomastia: no nodules noted, ordering testosterone level to further eval.--will call with results, may be due to Amitriptyline, will titrate dose down (decrease to 125 mg at night from 150 mg now; wait two months and if no side effects will decrease down to 100 mg at night for 3 months before reducing further).    Fatigue: TSH wnl from previous labs, will see if testosterone level low; could be due to not taking vitamin d as prescribed--will get pt taking correctly and then re-check vit. D level in October    Decreased libido: could be accumulative effect of amitriptyline, lyrica and Effexor; could be due to decrease testosterone level. Will monitor    Insomnia: decreasing off amitriptyline, will monitor    Anxiety: pt stating no side effects or adverse effects with Effexor, will continue, may need to increase when getting amitriptyline lowered. Goal of amitriptyline would be 50 mg nightly.     Vitamin D def.: pt to take Ergocalciferol once a week as prescribed and will re-check level in mid October.

## 2020-08-12 LAB — TESTOST SERPL-MCNC: 247 NG/DL (ref 264–916)

## 2020-08-13 LAB
ESTRADIOL SERPL-MCNC: 20.8 PG/ML (ref 7.6–42.6)
Lab: NORMAL
WRITTEN AUTHORIZATION: NORMAL

## 2020-08-14 ENCOUNTER — TELEPHONE (OUTPATIENT)
Dept: FAMILY MEDICINE CLINIC | Facility: CLINIC | Age: 55
End: 2020-08-14

## 2020-11-04 ENCOUNTER — OFFICE VISIT (OUTPATIENT)
Dept: FAMILY MEDICINE CLINIC | Facility: CLINIC | Age: 55
End: 2020-11-04

## 2020-11-04 DIAGNOSIS — F41.9 ANXIETY: Primary | ICD-10-CM

## 2020-11-04 DIAGNOSIS — E78.1 PURE HYPERTRIGLYCERIDEMIA: ICD-10-CM

## 2020-11-04 DIAGNOSIS — F51.04 CHRONIC INSOMNIA: ICD-10-CM

## 2020-11-04 DIAGNOSIS — I10 ESSENTIAL HYPERTENSION: ICD-10-CM

## 2020-11-04 DIAGNOSIS — E55.9 VITAMIN D DEFICIENCY: ICD-10-CM

## 2020-11-04 PROCEDURE — 99442 PR PHYS/QHP TELEPHONE EVALUATION 11-20 MIN: CPT | Performed by: NURSE PRACTITIONER

## 2020-11-04 RX ORDER — ENALAPRIL MALEATE 5 MG/1
5 TABLET ORAL DAILY
Qty: 90 TABLET | Refills: 1 | Status: SHIPPED | OUTPATIENT
Start: 2020-11-04 | End: 2020-11-09

## 2020-11-04 RX ORDER — ROSUVASTATIN CALCIUM 10 MG/1
10 TABLET, COATED ORAL DAILY
Qty: 90 TABLET | Refills: 1 | Status: SHIPPED | OUTPATIENT
Start: 2020-11-04 | End: 2021-05-04

## 2020-11-04 RX ORDER — VENLAFAXINE HYDROCHLORIDE 75 MG/1
75 CAPSULE, EXTENDED RELEASE ORAL DAILY
Qty: 30 CAPSULE | Refills: 5 | Status: SHIPPED | OUTPATIENT
Start: 2020-11-04 | End: 2021-05-14

## 2020-11-04 RX ORDER — AMITRIPTYLINE HYDROCHLORIDE 100 MG/1
100 TABLET, FILM COATED ORAL NIGHTLY PRN
Qty: 90 TABLET | Refills: 1 | Status: SHIPPED | OUTPATIENT
Start: 2020-11-04 | End: 2021-04-05

## 2020-11-04 RX ORDER — ERGOCALCIFEROL 1.25 MG/1
50000 CAPSULE ORAL WEEKLY
Qty: 12 CAPSULE | Refills: 1 | Status: SHIPPED | OUTPATIENT
Start: 2020-11-04 | End: 2021-10-05

## 2020-11-04 NOTE — PATIENT INSTRUCTIONS
Anxiety: increasing his Effexor from 37.5 to 75 mg daily; informed of potential side effects/adverse effects and if having any to call office. Discussed getting a Therapist but pt moving around a lot with job right now and not stationary.     Chronic Insomnia: titrating down on amitriptyline; goal on amitriptyline is 50 mg nightly; pt informed of potential side effects/adverse effects and informed if having to call office. pt verb. understanding.    Vitamin D deficiency: need to get pt consistently taking vitamin D to see if improvement in fatigue. Pharmacy change today. Going to fill out script for labs and email to pt to have done in 1 month.     HTN: will stay on current medication, unable to assess b/p management at this time    HLD: Going to fill out script for labs and email to pt to have done in 1 month; will check on status of triglyceride, if still elevated will add low dose fibricor

## 2020-11-04 NOTE — PROGRESS NOTES
Subjective     Zachary Gerard is a 55 y.o.. male.     This was an audio enabled telemedicine encounter.  You have chosen to receive care through a telephone visit. Do you consent to use a telephone visit for your medical care today? Yes    Anxiety- pt stating he is taking his Effexor as prescribed, denies side effects, states he is still having anxiety issues. Pt still going through a rough divorce and is now working outside of [place], currently in [place].     Vitamin D def.-Pt stating he has been having problems with getting his vitamin d script filled, requesting change in pharmacy.     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 Pt stating he still has fatigue but better.     Insomnia- Pt stating he has been doing fine with decrease in amitriptyline, decreased to the 100 mg nightly 2-3 weeks ago. Pt stating he is sleeping fairly well.    HLD- pt still taking his crestor as prescribed    HTN-pt still taking his enalapril as prescribed.     Anxiety  Presents for follow-up visit. Symptoms include insomnia and nervous/anxious behavior. Patient reports no suicidal ideas. The severity of symptoms is moderate. The quality of sleep is fair.       The following portions of the patient's history were reviewed and updated as appropriate: allergies, current medications, past family history, past medical history, past social history, past surgical history and problem list.    Past Medical History:   Diagnosis Date   • Adverse effect of anesthesia     PATIENT STATES WAKES UP ANGRY & COMBATIVE   • Arthritis    • CTS (carpal tunnel syndrome)    • Diverticulosis    • Gout    • Gout    • H/O Right leg pain 2011   • Hiatal hernia    • Hypertension    • IBS (irritable bowel syndrome)    • Osteonecrosis (CMS/HCC)    • Osteoporosis    • Pulmonary emboli (CMS/HCC)        Past Surgical History:   Procedure Laterality Date   • EXTENSOR TENDON OF FOREARM / WRIST REPAIR Bilateral    •  FOOT FRACTURE SURGERY     • FOOT NEUROMA SURGERY Right 2003    Mccabe's neuroma   • HERNIA REPAIR      BILATERAL INGUINAL   • OTHER SURGICAL HISTORY Right 2007    Elbow surgery   • TOTAL HIP ARTHROPLASTY Left 3/22/2019    Procedure: TOTAL HIP ARTHROPLASTY;  Surgeon: Andrew Avila MD;  Location: [facility] OR;  Service: Orthopedics   • UMBILICAL HERNIA REPAIR N/A 1/16/2017    Procedure: UMBILICAL HERNIA REPAIR;  Surgeon: Fernando Montes Jr., MD;  Location: [facility] OR;  Service:        Review of Systems   Constitutional: Positive for fatigue.   Respiratory: Negative.    Cardiovascular: Negative.    Psychiatric/Behavioral: Positive for sleep disturbance. Negative for self-injury and suicidal ideas. The patient is nervous/anxious and has insomnia.        No Known Allergies    Objective     There were no vitals filed for this visit.; telephone visit  There is no height or weight on file to calculate BMI.; telephone visit    Physical Exam; telephone visit      Current Outpatient Medications:   •  allopurinol (ZYLOPRIM) 300 MG tablet, TAKE ONE TABLET BY MOUTH EVERY DAY, Disp: 90 tablet, Rfl: 1  •  amitriptyline (ELAVIL) 100 MG tablet, Take 1 tablet by mouth At Night As Needed for Sleep., Disp: 90 tablet, Rfl: 1  •  aspirin 81 MG tablet, Take 1 tablet by mouth Daily., Disp: , Rfl:   •  enalapril (VASOTEC) 5 MG tablet, Take 1 tablet by mouth Daily., Disp: 90 tablet, Rfl: 1  •  pregabalin (LYRICA) 150 MG capsule, Take 150 mg by mouth 2 (Two) Times a Day., Disp: , Rfl: 0  •  rosuvastatin (CRESTOR) 10 MG tablet, Take 1 tablet by mouth Daily., Disp: 90 tablet, Rfl: 1  •  venlafaxine XR (Effexor XR) 75 MG 24 hr capsule, Take 1 capsule by mouth Daily., Disp: 30 capsule, Rfl: 5  •  vitamin D (ERGOCALCIFEROL) 1.25 MG (83399 UT) capsule capsule, Take 1 capsule by mouth 1 (One) Time Per Week., Disp: 12 capsule, Rfl: 1  No current facility-administered medications for this visit.     XR Hip With or Without Pelvis 2 - 3 View  Left  Ordering physician's impression is located in the Encounter Note dated 05/05/20. X-ray performed in the DR room.      Assessment/Plan   Diagnoses and all orders for this visit:    1. Anxiety (Primary)  -     venlafaxine XR (Effexor XR) 75 MG 24 hr capsule; Take 1 capsule by mouth Daily.  Dispense: 30 capsule; Refill: 5    2. Chronic insomnia  -     amitriptyline (ELAVIL) 100 MG tablet; Take 1 tablet by mouth At Night As Needed for Sleep.  Dispense: 90 tablet; Refill: 1    3. Vitamin D deficiency  -     vitamin D (ERGOCALCIFEROL) 1.25 MG (00258 UT) capsule capsule; Take 1 capsule by mouth 1 (One) Time Per Week.  Dispense: 12 capsule; Refill: 1    4. Essential hypertension  -     enalapril (VASOTEC) 5 MG tablet; Take 1 tablet by mouth Daily.  Dispense: 90 tablet; Refill: 1    5. Pure hypertriglyceridemia  -     rosuvastatin (CRESTOR) 10 MG tablet; Take 1 tablet by mouth Daily.  Dispense: 90 tablet; Refill: 1        Patient Instructions   Anxiety: increasing his Effexor from 37.5 to 75 mg daily; informed of potential side effects/adverse effects and if having any to call office. Discussed getting a Therapist but pt moving around a lot with job right now and not stationary.     Chronic Insomnia: titrating down on amitriptyline; goal on amitriptyline is 50 mg nightly; pt informed of potential side effects/adverse effects and informed if having to call office. pt verb. understanding.    Vitamin D deficiency: need to get pt consistently taking vitamin D to see if improvement in fatigue. Pharmacy change today. Going to fill out script for labs and email to pt to have done in 1 month.     HTN: will stay on current medication, unable to assess b/p management at this time    HLD: Going to fill out script for labs and email to pt to have done in 1 month; will check on status of triglyceride, if still elevated will add low dose fibricor          Return for follow up in 3 months .

## 2020-11-08 DIAGNOSIS — I10 ESSENTIAL HYPERTENSION: ICD-10-CM

## 2020-11-08 RX ORDER — ALLOPURINOL 300 MG/1
TABLET ORAL
Qty: 30 TABLET | OUTPATIENT
Start: 2020-11-08

## 2020-11-09 RX ORDER — ENALAPRIL MALEATE 5 MG/1
TABLET ORAL
Qty: 90 TABLET | Refills: 1 | Status: SHIPPED | OUTPATIENT
Start: 2020-11-09 | End: 2021-05-04

## 2020-11-13 ENCOUNTER — TELEPHONE (OUTPATIENT)
Dept: FAMILY MEDICINE CLINIC | Facility: CLINIC | Age: 55
End: 2020-11-13

## 2020-11-13 RX ORDER — ALLOPURINOL 300 MG/1
300 TABLET ORAL DAILY
Qty: 90 TABLET | Refills: 1 | Status: SHIPPED | OUTPATIENT
Start: 2020-11-13 | End: 2021-05-12

## 2020-11-13 NOTE — TELEPHONE ENCOUNTER
Alison,  I did speak with Dr. Hendrix being Lili is out of office about refilling my brothers allopuriool and he said he was okay with doing it. Not sure why this refill request still keeps going to Anisha Wu's office. Please have Dr. Hendrix send rx for his    Allopurinol 300 mg 1 tab QD #90     To: [facility]   [address] [place] [address], [place]   [phone]    Will you please take out all other pharmacy's except the [facility] list above.     Thank You    Pt's # [phone]

## 2020-11-16 ENCOUNTER — TELEPHONE (OUTPATIENT)
Dept: FAMILY MEDICINE CLINIC | Facility: CLINIC | Age: 55
End: 2020-11-16

## 2020-11-16 DIAGNOSIS — R19.7 DIARRHEA, UNSPECIFIED TYPE: Primary | ICD-10-CM

## 2020-11-16 RX ORDER — SUCRALFATE 1 G/1
1 TABLET ORAL 3 TIMES DAILY
Qty: 30 TABLET | Refills: 0 | Status: SHIPPED | OUTPATIENT
Start: 2020-11-16 | End: 2020-11-23

## 2020-11-16 RX ORDER — DICYCLOMINE HYDROCHLORIDE 10 MG/1
10 CAPSULE ORAL
Qty: 30 CAPSULE | Refills: 0 | Status: SHIPPED | OUTPATIENT
Start: 2020-11-16 | End: 2020-11-26

## 2020-11-23 DIAGNOSIS — R19.7 DIARRHEA, UNSPECIFIED TYPE: ICD-10-CM

## 2020-11-23 RX ORDER — SUCRALFATE 1 G/1
TABLET ORAL
Qty: 30 TABLET | Refills: 0 | Status: SHIPPED | OUTPATIENT
Start: 2020-11-23 | End: 2021-10-05

## 2020-11-26 LAB
25(OH)D3+25(OH)D2 SERPL-MCNC: 24.9 NG/ML (ref 30–100)
ALBUMIN SERPL-MCNC: 4.4 G/DL (ref 3.8–4.9)
ALBUMIN/GLOB SERPL: 2.1 {RATIO} (ref 1.2–2.2)
ALP SERPL-CCNC: 159 IU/L (ref 39–117)
ALT SERPL-CCNC: 54 IU/L (ref 0–44)
AMBIG ABBREV CMP14 DEFAULT: NORMAL
AMBIG ABBREV LP DEFAULT: NORMAL
AST SERPL-CCNC: 79 IU/L (ref 0–40)
BASOPHILS # BLD AUTO: 0 X10E3/UL (ref 0–0.2)
BASOPHILS NFR BLD AUTO: 0 %
BILIRUB SERPL-MCNC: 0.7 MG/DL (ref 0–1.2)
BUN SERPL-MCNC: 10 MG/DL (ref 6–24)
BUN/CREAT SERPL: 14 (ref 9–20)
CALCIUM SERPL-MCNC: 8.9 MG/DL (ref 8.7–10.2)
CHLORIDE SERPL-SCNC: 97 MMOL/L (ref 96–106)
CHOLEST SERPL-MCNC: 122 MG/DL (ref 100–199)
CO2 SERPL-SCNC: 27 MMOL/L (ref 20–29)
CREAT SERPL-MCNC: 0.74 MG/DL (ref 0.76–1.27)
EOSINOPHIL # BLD AUTO: 0.1 X10E3/UL (ref 0–0.4)
EOSINOPHIL NFR BLD AUTO: 1 %
ERYTHROCYTE [DISTWIDTH] IN BLOOD BY AUTOMATED COUNT: 13.7 % (ref 11.6–15.4)
GLOBULIN SER CALC-MCNC: 2.1 G/DL (ref 1.5–4.5)
GLUCOSE SERPL-MCNC: 144 MG/DL (ref 65–99)
HCT VFR BLD AUTO: 45.1 % (ref 37.5–51)
HDLC SERPL-MCNC: 62 MG/DL
HGB BLD-MCNC: 16.2 G/DL (ref 13–17.7)
IMM GRANULOCYTES # BLD AUTO: 0 X10E3/UL (ref 0–0.1)
IMM GRANULOCYTES NFR BLD AUTO: 0 %
LDLC SERPL CALC-MCNC: 37 MG/DL (ref 0–99)
LYMPHOCYTES # BLD AUTO: 1.7 X10E3/UL (ref 0.7–3.1)
LYMPHOCYTES NFR BLD AUTO: 30 %
MCH RBC QN AUTO: 34.7 PG (ref 26.6–33)
MCHC RBC AUTO-ENTMCNC: 35.9 G/DL (ref 31.5–35.7)
MCV RBC AUTO: 97 FL (ref 79–97)
MONOCYTES # BLD AUTO: 0.4 X10E3/UL (ref 0.1–0.9)
MONOCYTES NFR BLD AUTO: 7 %
NEUTROPHILS # BLD AUTO: 3.6 X10E3/UL (ref 1.4–7)
NEUTROPHILS NFR BLD AUTO: 62 %
PLATELET # BLD AUTO: 162 X10E3/UL (ref 150–450)
POTASSIUM SERPL-SCNC: 3.7 MMOL/L (ref 3.5–5.2)
PROT SERPL-MCNC: 6.5 G/DL (ref 6–8.5)
RBC # BLD AUTO: 4.67 X10E6/UL (ref 4.14–5.8)
SODIUM SERPL-SCNC: 140 MMOL/L (ref 134–144)
TRIGL SERPL-MCNC: 138 MG/DL (ref 0–149)
VLDLC SERPL CALC-MCNC: 23 MG/DL (ref 5–40)
WBC # BLD AUTO: 5.8 X10E3/UL (ref 3.4–10.8)

## 2020-11-30 DIAGNOSIS — R19.7 DIARRHEA, UNSPECIFIED TYPE: ICD-10-CM

## 2020-12-01 RX ORDER — SUCRALFATE 1 G/1
TABLET ORAL
Qty: 30 TABLET | Refills: 0 | OUTPATIENT
Start: 2020-12-01

## 2020-12-12 LAB
BACTERIA SPEC CULT: NORMAL
BACTERIA SPEC CULT: NORMAL
C DIFF TOX A+B STL QL IA: NEGATIVE
CAMPYLOBACTER STL CULT: NORMAL
E COLI SXT STL QL IA: NEGATIVE
H PYLORI AG STL QL IA: NEGATIVE
O+P SPEC MICRO: NORMAL
O+P STL CONC: NORMAL
SALM + SHIG STL CULT: NORMAL
WBC STL QL MICRO: NORMAL
WBC STL QL MICRO: NORMAL

## 2021-04-05 RX ORDER — AMITRIPTYLINE HYDROCHLORIDE 25 MG/1
TABLET, FILM COATED ORAL
Qty: 60 TABLET | Refills: 5 | Status: SHIPPED | OUTPATIENT
Start: 2021-04-05 | End: 2021-05-04

## 2021-05-04 DIAGNOSIS — F51.04 CHRONIC INSOMNIA: ICD-10-CM

## 2021-05-04 DIAGNOSIS — I10 ESSENTIAL HYPERTENSION: ICD-10-CM

## 2021-05-04 DIAGNOSIS — E78.1 PURE HYPERTRIGLYCERIDEMIA: ICD-10-CM

## 2021-05-04 RX ORDER — ENALAPRIL MALEATE 5 MG/1
TABLET ORAL
Qty: 90 TABLET | Refills: 1 | Status: SHIPPED | OUTPATIENT
Start: 2021-05-04 | End: 2021-10-05 | Stop reason: SDUPTHER

## 2021-05-04 RX ORDER — AMITRIPTYLINE HYDROCHLORIDE 100 MG/1
100 TABLET, FILM COATED ORAL NIGHTLY PRN
Qty: 90 TABLET | Refills: 1 | Status: SHIPPED | OUTPATIENT
Start: 2021-05-04 | End: 2021-10-05 | Stop reason: SDUPTHER

## 2021-05-04 RX ORDER — ROSUVASTATIN CALCIUM 10 MG/1
10 TABLET, COATED ORAL DAILY
Qty: 90 TABLET | Refills: 1 | Status: SHIPPED | OUTPATIENT
Start: 2021-05-04 | End: 2021-10-05 | Stop reason: SDUPTHER

## 2021-05-12 RX ORDER — ALLOPURINOL 300 MG/1
300 TABLET ORAL DAILY
Qty: 90 TABLET | Refills: 1 | Status: SHIPPED | OUTPATIENT
Start: 2021-05-12 | End: 2021-10-05

## 2021-05-14 DIAGNOSIS — F41.9 ANXIETY: ICD-10-CM

## 2021-05-14 RX ORDER — VENLAFAXINE HYDROCHLORIDE 75 MG/1
75 CAPSULE, EXTENDED RELEASE ORAL DAILY
Qty: 30 CAPSULE | Refills: 5 | Status: SHIPPED | OUTPATIENT
Start: 2021-05-14 | End: 2021-10-05 | Stop reason: SDUPTHER

## 2021-10-05 ENCOUNTER — OFFICE VISIT (OUTPATIENT)
Dept: FAMILY MEDICINE CLINIC | Facility: CLINIC | Age: 56
End: 2021-10-05

## 2021-10-05 DIAGNOSIS — E55.9 VITAMIN D DEFICIENCY: ICD-10-CM

## 2021-10-05 DIAGNOSIS — R74.8 ELEVATED LIVER ENZYMES: ICD-10-CM

## 2021-10-05 DIAGNOSIS — E78.1 PURE HYPERTRIGLYCERIDEMIA: ICD-10-CM

## 2021-10-05 DIAGNOSIS — F41.9 ANXIETY: ICD-10-CM

## 2021-10-05 DIAGNOSIS — F51.04 CHRONIC INSOMNIA: ICD-10-CM

## 2021-10-05 DIAGNOSIS — Z12.5 SCREENING PSA (PROSTATE SPECIFIC ANTIGEN): ICD-10-CM

## 2021-10-05 DIAGNOSIS — R73.9 ELEVATED BLOOD SUGAR: ICD-10-CM

## 2021-10-05 DIAGNOSIS — I10 ESSENTIAL HYPERTENSION: Primary | ICD-10-CM

## 2021-10-05 DIAGNOSIS — M85.80 OSTEOPENIA, UNSPECIFIED LOCATION: ICD-10-CM

## 2021-10-05 PROBLEM — M79.672 PAIN OF BOTH HEELS: Status: ACTIVE | Noted: 2021-10-05

## 2021-10-05 PROBLEM — K46.9 HERNIA: Status: ACTIVE | Noted: 2018-04-06

## 2021-10-05 PROBLEM — M77.32 HEEL SPUR, LEFT: Status: ACTIVE | Noted: 2021-10-05

## 2021-10-05 PROBLEM — M25.579 ANKLE PAIN: Status: ACTIVE | Noted: 2019-04-18

## 2021-10-05 PROBLEM — I82.90 BLOOD CLOT IN VEIN: Status: ACTIVE | Noted: 2021-10-05

## 2021-10-05 PROBLEM — M79.671 PAIN OF BOTH HEELS: Status: ACTIVE | Noted: 2021-10-05

## 2021-10-05 PROCEDURE — 99213 OFFICE O/P EST LOW 20 MIN: CPT | Performed by: NURSE PRACTITIONER

## 2021-10-05 RX ORDER — ROSUVASTATIN CALCIUM 10 MG/1
10 TABLET, COATED ORAL DAILY
Qty: 90 TABLET | Refills: 1 | Status: SHIPPED | OUTPATIENT
Start: 2021-10-05 | End: 2021-11-01

## 2021-10-05 RX ORDER — AMITRIPTYLINE HYDROCHLORIDE 25 MG/1
50 TABLET, FILM COATED ORAL
COMMUNITY
Start: 2021-08-30 | End: 2021-10-05 | Stop reason: SDUPTHER

## 2021-10-05 RX ORDER — ENALAPRIL MALEATE 5 MG/1
5 TABLET ORAL DAILY
Qty: 90 TABLET | Refills: 1 | Status: SHIPPED | OUTPATIENT
Start: 2021-10-05 | End: 2021-11-01

## 2021-10-05 RX ORDER — VENLAFAXINE HYDROCHLORIDE 75 MG/1
75 CAPSULE, EXTENDED RELEASE ORAL DAILY
Qty: 30 CAPSULE | Refills: 5 | Status: SHIPPED | OUTPATIENT
Start: 2021-10-05 | End: 2022-02-09

## 2021-10-05 RX ORDER — AMITRIPTYLINE HYDROCHLORIDE 100 MG/1
100 TABLET, FILM COATED ORAL NIGHTLY PRN
Qty: 90 TABLET | Refills: 1 | Status: SHIPPED | OUTPATIENT
Start: 2021-10-05 | End: 2021-11-01

## 2021-10-05 RX ORDER — AMITRIPTYLINE HYDROCHLORIDE 25 MG/1
50 TABLET, FILM COATED ORAL
Qty: 60 TABLET | Refills: 5 | Status: SHIPPED | OUTPATIENT
Start: 2021-10-05 | End: 2022-02-09

## 2021-10-05 NOTE — PROGRESS NOTES
Subjective     Zachary Gerard is a 56 y.o.. male.     You have chosen to receive care through a telephone visit. Do you consent to use a telephone visit for your medical care today? yes    Pt's visit today for follow up on HTN, HLD, Anxiety, insomnia, and Vitamin D deficiency. Pt stating he is still not back in [place]. Pt stating he has his b/p checked occasionally with result of 125-142/78-89. Pt stating he is walking his dog daily and eating a healthy diet. Pt stating he is drinking alcohol about 2 times a week. Pt stating he is no longer taking CBD oil. Pt stating he has been seeing a Dr. Johnnie Meadows, GI and has had a colonoscopy and now going for a CT of abd. Pt stating he has been dx with pancreatic dysfunction. Pt's last labs were done 11/25/2020; his last face to face in office was 8/11/2020; pt's last Dexa scan was in 2018. Pt stating his divorce is still in court and he does not know when he will be back in [place]. Pt stating he did try to reduce his amitryptyline down further to 100 mg nightly but insomnia worsened so he went back up to the 150 mg nightly. Pt stating the effexor is helping with his anxiety.         The following portions of the patient's history were reviewed and updated as appropriate: allergies, current medications, past family history, past medical history, past social history, past surgical history and problem list.    Past Medical History:   Diagnosis Date   • Adverse effect of anesthesia     PATIENT STATES WAKES UP ANGRY & COMBATIVE   • Arthritis    • CTS (carpal tunnel syndrome)    • Diverticulosis    • Gout    • Gout    • H/O Right leg pain 2011   • Hiatal hernia    • Hypertension    • IBS (irritable bowel syndrome)    • Osteonecrosis (CMS/HCC)    • Osteoporosis    • Pulmonary emboli (CMS/HCC)        Past Surgical History:   Procedure Laterality Date   • EXTENSOR TENDON OF FOREARM / WRIST REPAIR Bilateral    • FOOT FRACTURE SURGERY     • FOOT NEUROMA SURGERY Right 2003     Mccabe's neuroma   • HERNIA REPAIR      BILATERAL INGUINAL   • OTHER SURGICAL HISTORY Right 2007    Elbow surgery   • TOTAL HIP ARTHROPLASTY Left 3/22/2019    Procedure: TOTAL HIP ARTHROPLASTY;  Surgeon: Andrew Avila MD;  Location: [facility];  Service: Orthopedics   • UMBILICAL HERNIA REPAIR N/A 1/16/2017    Procedure: UMBILICAL HERNIA REPAIR;  Surgeon: Fernando Montes Jr., MD;  Location: [facility];  Service:        Review of Systems   Constitutional: Negative.    Respiratory: Negative.    Cardiovascular: Negative.    Gastrointestinal:        Doing slightly better; seeing a GI for further workup   Neurological: Negative.    Psychiatric/Behavioral: Negative for sleep disturbance (on amitriptyline and doing well). The patient is not nervous/anxious (on effexor and doing well).        No Known Allergies    Objective     There were no vitals filed for this visit.; telephone visit  There is no height or weight on file to calculate BMI.; telephone visit    Physical Exam; telephone visit      Current Outpatient Medications:   •  amitriptyline (ELAVIL) 100 MG tablet, Take 1 tablet by mouth At Night As Needed for Sleep., Disp: 90 tablet, Rfl: 1  •  amitriptyline (ELAVIL) 25 MG tablet, Take 2 tablets by mouth every night at bedtime., Disp: 60 tablet, Rfl: 5  •  aspirin 81 MG tablet, Take 1 tablet by mouth Daily., Disp: , Rfl:   •  enalapril (VASOTEC) 5 MG tablet, Take 1 tablet by mouth Daily., Disp: 90 tablet, Rfl: 1  •  rosuvastatin (CRESTOR) 10 MG tablet, Take 1 tablet by mouth Daily., Disp: 90 tablet, Rfl: 1  •  venlafaxine XR (EFFEXOR-XR) 75 MG 24 hr capsule, Take 1 capsule by mouth Daily., Disp: 30 capsule, Rfl: 5      Assessment/Plan   Diagnoses and all orders for this visit:    1. Essential hypertension (Primary)  -     enalapril (VASOTEC) 5 MG tablet; Take 1 tablet by mouth Daily.  Dispense: 90 tablet; Refill: 1    2. Pure hypertriglyceridemia  -     rosuvastatin (CRESTOR) 10 MG tablet; Take 1 tablet by  mouth Daily.  Dispense: 90 tablet; Refill: 1    3. Anxiety  -     venlafaxine XR (EFFEXOR-XR) 75 MG 24 hr capsule; Take 1 capsule by mouth Daily.  Dispense: 30 capsule; Refill: 5    4. Chronic insomnia  -     amitriptyline (ELAVIL) 100 MG tablet; Take 1 tablet by mouth At Night As Needed for Sleep.  Dispense: 90 tablet; Refill: 1  -     amitriptyline (ELAVIL) 25 MG tablet; Take 2 tablets by mouth every night at bedtime.  Dispense: 60 tablet; Refill: 5    5. Vitamin D deficiency    6. Elevated liver enzymes    7. Elevated blood sugar    8. Screening PSA (prostate specific antigen)    9. Osteopenia, unspecified location        Patient Instructions   Script written for labs to be done: CBC, CMP, lipids, HgA1c, Vitamin D level, PSA level; will be faxed down to [facility] in [place] for pt to have done asap.     HTN: Pt informed he should buy a at home manual b/p cuff and keep track of b/p 2-3 times a week. Refilling enalapril 5 mg; pt needs to be seen back in office for face to face visit within 3-6 months.     HLD: recommend eating a heart healthy diet, drink plenty of water with goal 64 oz a day and increasing exercise as able; refilling rosuvastatin 10 mg daily; pt needs to be seen back in office for face to face visit within 3-6 months.     Anxiety: refilling venlafaxine XR 75 mg daily; pt needs to be seen back in office for face to face visit within 3-6 months.     Insomnia: refilling amitriptyline 150 mg nightly; pt needs to be seen back in office for face to face visit within 3-6 months.     Vitamin D def.: pt stopped vitamin D supplement; will await to see lab results for further treatment.    Elevated liver enzymes: If labs show continual elevated liver enzymes will need to do liver u/s for further eval.    Elevated blood sugar: HgA1c ordered in labs to evaluate status; awaiting lab results    PSA ordered for screening of prostate    Osteopenia: Will need to do Dexa scan when pt back in [place] for  anat-lissette.; last done in 2018    Requested pt to have GI doctor in [place], Dr. Meadows to send all labs, imaging and progress notes for continual of care/keeping informed of patients status.       Return for come in for face to face office visit within 3-6 months.

## 2021-10-05 NOTE — PATIENT INSTRUCTIONS
Script written for labs to be done: CBC, CMP, lipids, HgA1c, Vitamin D level, PSA level; will be faxed down to [facility] in [place] for pt to have done asap.     HTN: Pt informed he should buy a at home manual b/p cuff and keep track of b/p 2-3 times a week. Refilling enalapril 5 mg; pt needs to be seen back in office for face to face visit within 3-6 months.     HLD: recommend eating a heart healthy diet, drink plenty of water with goal 64 oz a day and increasing exercise as able; refilling rosuvastatin 10 mg daily; pt needs to be seen back in office for face to face visit within 3-6 months.     Anxiety: refilling venlafaxine XR 75 mg daily; pt needs to be seen back in office for face to face visit within 3-6 months.     Insomnia: refilling amitriptyline 150 mg nightly; pt needs to be seen back in office for face to face visit within 3-6 months.     Vitamin D def.: pt stopped vitamin D supplement; will await to see lab results for further treatment.    Elevated liver enzymes: If labs show continual elevated liver enzymes will need to do liver u/s for further eval.    Elevated blood sugar: HgA1c ordered in labs to evaluate status; awaiting lab results    PSA ordered for screening of prostate    Osteopenia: Will need to do Dexa scan when pt back in [place] for re-eval.; last done in 2018    Requested pt to have GI doctor in [place], Dr. Meadows to send all labs, imaging and progress notes for continual of care/keeping informed of patients status.   
Diverticulitis of intestine w/o perforation or abscess w/o bleeding

## 2021-10-30 DIAGNOSIS — E78.1 PURE HYPERTRIGLYCERIDEMIA: ICD-10-CM

## 2021-10-30 DIAGNOSIS — I10 ESSENTIAL HYPERTENSION: ICD-10-CM

## 2021-10-30 DIAGNOSIS — F51.04 CHRONIC INSOMNIA: ICD-10-CM

## 2021-11-01 RX ORDER — ENALAPRIL MALEATE 5 MG/1
5 TABLET ORAL DAILY
Qty: 90 TABLET | Refills: 1 | Status: SHIPPED | OUTPATIENT
Start: 2021-11-01 | End: 2022-02-09

## 2021-11-01 RX ORDER — AMITRIPTYLINE HYDROCHLORIDE 100 MG/1
100 TABLET, FILM COATED ORAL NIGHTLY PRN
Qty: 90 TABLET | Refills: 1 | Status: SHIPPED | OUTPATIENT
Start: 2021-11-01 | End: 2022-02-09

## 2021-11-01 RX ORDER — ROSUVASTATIN CALCIUM 10 MG/1
10 TABLET, COATED ORAL DAILY
Qty: 90 TABLET | Refills: 1 | Status: SHIPPED | OUTPATIENT
Start: 2021-11-01 | End: 2022-02-09

## 2021-11-01 NOTE — TELEPHONE ENCOUNTER
Rx Refill Note  Requested Prescriptions     Pending Prescriptions Disp Refills   • rosuvastatin (CRESTOR) 10 MG tablet [Pharmacy Med Name: ROSUVASTATIN 10MG TABLETS] 90 tablet 1     Sig: TAKE 1 TABLET BY MOUTH DAILY   • amitriptyline (ELAVIL) 100 MG tablet [Pharmacy Med Name: AMITRIPTYLINE 100MG (HUNDRED MG)TAB] 90 tablet 1     Sig: TAKE 1 TABLET BY MOUTH AT NIGHT AS NEEDED FOR SLEEP   • enalapril (VASOTEC) 5 MG tablet [Pharmacy Med Name: ENALAPRIL 5MG TABLETS] 90 tablet 1     Sig: TAKE 1 TABLET BY MOUTH DAILY      Last office visit with prescribing clinician: 10/5/2021      Next office visit with prescribing clinician: Visit date not found            Alison Orozco MA  11/01/21, 07:52 EDT

## 2021-11-03 ENCOUNTER — TELEPHONE (OUTPATIENT)
Dept: FAMILY MEDICINE CLINIC | Facility: CLINIC | Age: 56
End: 2021-11-03

## 2021-11-03 DIAGNOSIS — E55.9 VITAMIN D DEFICIENCY: Primary | ICD-10-CM

## 2021-11-03 DIAGNOSIS — E78.1 HYPERTRIGLYCERIDEMIA: Primary | ICD-10-CM

## 2021-11-03 RX ORDER — ERGOCALCIFEROL 1.25 MG/1
50000 CAPSULE ORAL WEEKLY
Qty: 15 CAPSULE | Refills: 3 | Status: SHIPPED | OUTPATIENT
Start: 2021-11-03 | End: 2022-02-09

## 2021-11-03 RX ORDER — FENOFIBRATE 48 MG/1
48 TABLET, COATED ORAL DAILY
Qty: 90 TABLET | Refills: 1 | Status: SHIPPED | OUTPATIENT
Start: 2021-11-03 | End: 2022-02-09

## 2021-11-09 ENCOUNTER — DOCUMENTATION (OUTPATIENT)
Dept: FAMILY MEDICINE CLINIC | Facility: CLINIC | Age: 56
End: 2021-11-09

## 2021-11-09 NOTE — PROGRESS NOTES
Staff directed to call pt with information:  please call pt and inform him that his total cholesterol, HDL and LDL are all wnl; continue crestor 10 mg daily; Trig. elevated at 278 (should be below 150), will send over script for tricor 48 mg 1 tab daily to help decrease trig.     glucose wnl and HgA1c wnl--sugars good    AST/ALT--liver functions normal; Bun/Creat.--kidney functions good    WBC normal, H&H wnl, no anemia, PSA normal (prostate)    Vitamin D level very low, will have to restart vitamin d supplement, will send to pharmacy    should recheck lipid panel and vitamin d level in 6 months.

## 2021-11-23 DIAGNOSIS — F41.9 ANXIETY: ICD-10-CM

## 2021-11-23 RX ORDER — VENLAFAXINE HYDROCHLORIDE 75 MG/1
75 CAPSULE, EXTENDED RELEASE ORAL DAILY
Qty: 30 CAPSULE | Refills: 5 | OUTPATIENT
Start: 2021-11-23

## 2022-02-01 DIAGNOSIS — I10 ESSENTIAL HYPERTENSION: ICD-10-CM

## 2022-02-01 DIAGNOSIS — E78.1 PURE HYPERTRIGLYCERIDEMIA: ICD-10-CM

## 2022-02-01 DIAGNOSIS — E78.1 HYPERTRIGLYCERIDEMIA: ICD-10-CM

## 2022-02-01 DIAGNOSIS — E55.9 VITAMIN D DEFICIENCY: ICD-10-CM

## 2022-02-01 DIAGNOSIS — F51.04 CHRONIC INSOMNIA: ICD-10-CM

## 2022-02-01 DIAGNOSIS — F41.9 ANXIETY: ICD-10-CM

## 2022-02-01 RX ORDER — ENALAPRIL MALEATE 5 MG/1
5 TABLET ORAL DAILY
Qty: 90 TABLET | Refills: 1 | Status: CANCELLED | OUTPATIENT
Start: 2022-02-01

## 2022-02-01 RX ORDER — ERGOCALCIFEROL 1.25 MG/1
CAPSULE ORAL
Qty: 15 CAPSULE | Refills: 3 | Status: CANCELLED | OUTPATIENT
Start: 2022-02-01

## 2022-02-02 DIAGNOSIS — E78.1 HYPERTRIGLYCERIDEMIA: ICD-10-CM

## 2022-02-02 NOTE — TELEPHONE ENCOUNTER
Lili,      Please review and refill if appropriate. Let me know if anything additional is needed.      Thanks,  Emir    Next OV none scheduled  Last OV 10/05/2022

## 2022-02-02 NOTE — TELEPHONE ENCOUNTER
Lili,      Please review and refill if appropriate. Let me know if anything additional is needed.      Thanks,  Emir    Next OV none schedule  Last OV 10/05/2021    I have reached out to patient via [facility] to schedule a f/u appointment with fasting labs per you last note 10/05/2021 patient was to f/u with lab work in 3-6 months.

## 2022-02-09 RX ORDER — ERGOCALCIFEROL 1.25 MG/1
CAPSULE ORAL
Qty: 15 CAPSULE | Refills: 3 | Status: SHIPPED | OUTPATIENT
Start: 2022-02-09

## 2022-02-09 RX ORDER — FENOFIBRATE 48 MG/1
TABLET, COATED ORAL
Qty: 90 TABLET | Refills: 1 | Status: SHIPPED | OUTPATIENT
Start: 2022-02-09 | End: 2022-02-09

## 2022-02-09 RX ORDER — AMITRIPTYLINE HYDROCHLORIDE 100 MG/1
100 TABLET, FILM COATED ORAL NIGHTLY PRN
Qty: 90 TABLET | Refills: 1 | Status: SHIPPED | OUTPATIENT
Start: 2022-02-09

## 2022-02-09 RX ORDER — ENALAPRIL MALEATE 5 MG/1
5 TABLET ORAL DAILY
Qty: 90 TABLET | Refills: 1 | Status: SHIPPED | OUTPATIENT
Start: 2022-02-09

## 2022-02-09 RX ORDER — AMITRIPTYLINE HYDROCHLORIDE 25 MG/1
50 TABLET, FILM COATED ORAL
Qty: 60 TABLET | Refills: 5 | Status: SHIPPED | OUTPATIENT
Start: 2022-02-09 | End: 2022-03-23 | Stop reason: SDUPTHER

## 2022-02-09 RX ORDER — FENOFIBRATE 48 MG/1
48 TABLET, COATED ORAL DAILY
Qty: 90 TABLET | Refills: 1 | Status: SHIPPED | OUTPATIENT
Start: 2022-02-09

## 2022-02-09 RX ORDER — VENLAFAXINE HYDROCHLORIDE 75 MG/1
75 CAPSULE, EXTENDED RELEASE ORAL DAILY
Qty: 30 CAPSULE | Refills: 5 | Status: SHIPPED | OUTPATIENT
Start: 2022-02-09 | End: 2022-05-12

## 2022-02-09 RX ORDER — ROSUVASTATIN CALCIUM 10 MG/1
10 TABLET, COATED ORAL DAILY
Qty: 90 TABLET | Refills: 1 | Status: SHIPPED | OUTPATIENT
Start: 2022-02-09

## 2022-03-23 ENCOUNTER — OFFICE VISIT (OUTPATIENT)
Dept: FAMILY MEDICINE CLINIC | Facility: CLINIC | Age: 57
End: 2022-03-23

## 2022-03-23 DIAGNOSIS — F51.04 CHRONIC INSOMNIA: Primary | ICD-10-CM

## 2022-03-23 DIAGNOSIS — F41.9 ANXIETY: ICD-10-CM

## 2022-03-23 PROCEDURE — 99213 OFFICE O/P EST LOW 20 MIN: CPT | Performed by: NURSE PRACTITIONER

## 2022-03-23 RX ORDER — AMITRIPTYLINE HYDROCHLORIDE 25 MG/1
50 TABLET, FILM COATED ORAL
Qty: 60 TABLET | Refills: 1 | Status: SHIPPED | OUTPATIENT
Start: 2022-03-23 | End: 2022-06-27

## 2022-03-23 NOTE — PATIENT INSTRUCTIONS
Discussed that the last face to face was 8/2020. Pt informed that he has to be seen once a year face to face. Pt informed that a 2 month supply of medication would be sent but he has to be seen within 2 months for face to face or he would have to find a provider elsewhere. Pt verb. Understanding.

## 2022-03-23 NOTE — PROGRESS NOTES
Subjective     Zachary Gerard is a 56 y.o.. male.     You have chosen to receive care through a telephone visit. Do you consent to use a telephone visit for your medical care today? yes    Pt's visit today is for request for refill of his amitriptyline 25 mg tabs. Pt stating he is still living in [place]. Pt stating his anxiety and insomnia is controlled well on his medications of amitriptyline and Effexor XR. Pt sating his stomach issues have improved and he is no longer having stomach issues. Pt stating he is eating healthy, drinking plenty of water and getting some exercise.     Pt stating that in January he was taken to ER for fever of 104 and passing out; negative for covid, positive for influ. Pt stating he was discharged with medications and felt better after treatment.     Pt's last labs in 10/26/21 with results of total cholesterol 171, HDL 58, trig. 278, and LDL 76, glucose 115, Bun/Creat. 5/0.79, Na 141, K+ 4.7, WBC 6.8, H&H 16.2/46.3, Plt 165, AST/ALT 30/25, HgA1c 5.6, Vitamin D 12, PSA 0.21      The following portions of the patient's history were reviewed and updated as appropriate: allergies, current medications, past family history, past medical history, past social history, past surgical history and problem list.    Past Medical History:   Diagnosis Date   • Adverse effect of anesthesia     PATIENT STATES WAKES UP ANGRY & COMBATIVE   • Arthritis    • CTS (carpal tunnel syndrome)    • Diverticulosis    • Gout    • Gout    • H/O Right leg pain 2011   • Hiatal hernia    • Hypertension    • IBS (irritable bowel syndrome)    • Osteonecrosis (CMS/HCC)    • Osteoporosis    • Pulmonary emboli (CMS/HCC)        Past Surgical History:   Procedure Laterality Date   • EXTENSOR TENDON OF FOREARM / WRIST REPAIR Bilateral    • FOOT FRACTURE SURGERY     • FOOT NEUROMA SURGERY Right 2003    Mccabe's neuroma   • HERNIA REPAIR      BILATERAL INGUINAL   • OTHER SURGICAL HISTORY Right 2007    Elbow surgery   • TOTAL HIP  ARTHROPLASTY Left 3/22/2019    Procedure: TOTAL HIP ARTHROPLASTY;  Surgeon: Andrew Avila MD;  Location: [facility] OR;  Service: Orthopedics   • UMBILICAL HERNIA REPAIR N/A 1/16/2017    Procedure: UMBILICAL HERNIA REPAIR;  Surgeon: Fernando Montes Jr., MD;  Location: [facility] OR;  Service:        Review of Systems   Constitutional: Negative.    Respiratory: Negative.    Cardiovascular: Negative.    Gastrointestinal: Negative.    Psychiatric/Behavioral: Negative.  The patient is not nervous/anxious.        No Known Allergies    Objective     There were no vitals filed for this visit.;telehealth visit  There is no height or weight on file to calculate BMI.; telehealth visit    Physical Exam; telehealth visit      Current Outpatient Medications:   •  amitriptyline (ELAVIL) 25 MG tablet, Take 2 tablets by mouth every night at bedtime., Disp: 60 tablet, Rfl: 1  •  amitriptyline (ELAVIL) 100 MG tablet, TAKE 1 TABLET BY MOUTH AT NIGHT AS NEEDED FOR SLEEP, Disp: 90 tablet, Rfl: 1  •  aspirin 81 MG tablet, Take 1 tablet by mouth Daily., Disp: , Rfl:   •  enalapril (VASOTEC) 5 MG tablet, TAKE 1 TABLET BY MOUTH DAILY, Disp: 90 tablet, Rfl: 1  •  fenofibrate (TRICOR) 48 MG tablet, TAKE 1 TABLET BY MOUTH DAILY, Disp: 90 tablet, Rfl: 1  •  rosuvastatin (CRESTOR) 10 MG tablet, TAKE 1 TABLET BY MOUTH DAILY, Disp: 90 tablet, Rfl: 1  •  venlafaxine XR (EFFEXOR-XR) 75 MG 24 hr capsule, TAKE 1 CAPSULE BY MOUTH DAILY, Disp: 30 capsule, Rfl: 5  •  vitamin D (ERGOCALCIFEROL) 1.25 MG (25257 UT) capsule capsule, TAKE ONE CAPSULE BY MOUTH ONE TIME PER WEEK., Disp: 15 capsule, Rfl: 3      Assessment/Plan   Diagnoses and all orders for this visit:    1. Chronic insomnia (Primary)  -     amitriptyline (ELAVIL) 25 MG tablet; Take 2 tablets by mouth every night at bedtime.  Dispense: 60 tablet; Refill: 1    2. Anxiety        Patient Instructions   Discussed that the last face to face was 8/2020. Pt informed that he has to be seen once a year  face to face. Pt informed that a 2 month supply of medication would be sent but he has to be seen within 2 months for face to face or he would have to find a provider elsewhere. Pt verb. Understanding.         Return for return to office for face to face within 2 months.

## 2022-05-06 DIAGNOSIS — F41.9 ANXIETY: ICD-10-CM

## 2022-05-09 NOTE — TELEPHONE ENCOUNTER
Lili,      Please review and refill if appropriate. Let me know if anything additional is needed.      Thanks,  Emir      Next OV none schedule  Last OV 03/23/2022

## 2022-05-12 RX ORDER — VENLAFAXINE HYDROCHLORIDE 75 MG/1
CAPSULE, EXTENDED RELEASE ORAL
Qty: 30 CAPSULE | Refills: 0 | Status: SHIPPED | OUTPATIENT
Start: 2022-05-12

## 2022-05-21 DIAGNOSIS — F51.04 CHRONIC INSOMNIA: ICD-10-CM

## 2022-05-24 RX ORDER — AMITRIPTYLINE HYDROCHLORIDE 25 MG/1
50 TABLET, FILM COATED ORAL
Qty: 60 TABLET | Refills: 1 | OUTPATIENT
Start: 2022-05-24

## 2022-06-27 DIAGNOSIS — F51.04 CHRONIC INSOMNIA: ICD-10-CM

## 2022-06-27 RX ORDER — AMITRIPTYLINE HYDROCHLORIDE 25 MG/1
50 TABLET, FILM COATED ORAL
Qty: 60 TABLET | Refills: 1 | Status: SHIPPED | OUTPATIENT
Start: 2022-06-27

## 2023-07-10 NOTE — PROGRESS NOTES
"Zachary Gerard / 51 y.o. / male  03/07/2017    VITALS    Visit Vitals   • /76   • Pulse 100   • Temp 98.1 °F (36.7 °C)   • Ht 75\" (190.5 cm)   • Wt 225 lb 3.2 oz (102 kg)   • SpO2 95%   • BMI 28.15 kg/m2     BP Readings from Last 3 Encounters:   03/07/17 118/76   01/16/17 172/85   01/05/17 128/82     Wt Readings from Last 3 Encounters:   03/07/17 225 lb 3.2 oz (102 kg)   01/16/17 226 lb (103 kg)   01/05/17 228 lb 3.2 oz (104 kg)      Body mass index is 28.15 kg/(m^2).    CC:  Main reason(s) for today's visit: Follow-up (Hernia)      HPI:     Patient presents today for four-month follow-up of hypertension.  When his last seen by me, his blood pressure was well-controlled.    Hypertension: He is compliant with medication, dietary salt restriction, and has resumed stop smoking active on a regular basis with his job, he does monitor blood pressure the outpatient setting and typically blood pressure runs in the fairly normal range.    Interval history: He was seen by Dr. Montes and had umbilical hernia repair, he has recovered nicely without any side effects.    He was seen by rheumatology for nonspecific arthropathies and was treated intermittently with prednisone taper 50 mg declined by 10 mg daily.  Rheumatology will no longer prescribed this, he was told to have me prescribe this for him.    Laboratory: Patient had lab preop CBC, CMP, lipid all normal January 2017.      ______________________________________________________    ASSESSMENT & PLAN:    1. Essential hypertension    2. Other specific arthropathies, not elsewhere classified, unspecified site      No orders of the defined types were placed in this encounter.      Summary/Discussion:     · #1 hypertension stable on current medical regimen.  Plan: Same meds, blood pressure check 4 months.  ·   · #2 nonspecific arthropathy treated with intermittent doses of prednisone, refill meds as above.      Return in about 4 months (around 7/7/2017) for Blood " pressure check.    No future appointments.    ______________________________________________________    REVIEW OF SYSTEMS    Review of Systems   Respiratory: Negative for chest tightness and shortness of breath.    Cardiovascular: Negative for chest pain and palpitations.   Neurological: Negative for dizziness, syncope, speech difficulty, weakness, light-headedness and headaches.       PHYSICAL EXAMINATION    Physical Exam   Constitutional: He is oriented to person, place, and time. He appears well-developed and well-nourished. No distress.   Cardiovascular: Normal rate, regular rhythm, normal heart sounds and intact distal pulses.  Exam reveals no gallop and no friction rub.    No murmur heard.  Pulmonary/Chest: Effort normal and breath sounds normal. He has no wheezes. He has no rales.   Musculoskeletal: He exhibits no edema.   Neurological: He is alert and oriented to person, place, and time.   Skin: Skin is warm and dry. No rash noted.   Psychiatric: He has a normal mood and affect. His behavior is normal. Judgment and thought content normal.   Nursing note and vitals reviewed.    REVIEWED DATA:    Labs:   Lab Results   Component Value Date     01/16/2017    K 4.7 01/16/2017    AST 15 01/16/2017    ALT 20 01/16/2017    BUN 14 01/16/2017    CREATININE 0.87 01/16/2017    EGFRIFNONA 92 01/16/2017       No results found for: GLU, HGBA1C    Lab Results   Component Value Date    HDL 41 01/16/2017    TRIG 197 (H) 01/16/2017       No results found for: TSH, FREET4       Lab Results   Component Value Date    WBC 7.36 01/16/2017    HGB 15.2 01/16/2017    HGB 14.8 06/22/2015     01/16/2017        Imaging:        Medical Tests:        Summary of old records / correspondence / consultant report:        Request outside records:          ALLERGIES:    Review of patient's allergies indicates no known allergies.    MEDICATIONS:       Outpatient Medications Prior to Visit   Medication Sig Dispense Refill   •  allopurinol (ZYLOPRIM) 300 MG tablet Take 1 tablet by mouth Daily. 90 tablet 3   • amitriptyline (ELAVIL) 25 MG tablet Take 1 tablet by mouth daily. (Patient taking differently: Take 25 mg by mouth At Night As Needed. Take 1 tablet by mouth NIGHTLY IN ADDITION TO  MG TAB IF NEEDED) 90 tablet 3   • enalapril (VASOTEC) 2.5 MG tablet Take 1 tablet by mouth Daily. 90 tablet 3   • amitriptyline (ELAVIL) 100 MG tablet Take 1 tablet by mouth every day at bedtime. (Patient taking differently: Take 100 mg by mouth At Night As Needed. Take 1 tablet by mouth every day at bedtime.) 90 tablet 3   • oxyCODONE-acetaminophen (PERCOCET) 5-325 MG per tablet 1-2 q 6 hrs prn pain 40 tablet 0     No facility-administered medications prior to visit.        [facility]    The following portions of the patient's history were reviewed and updated as appropriate: Allergies / Current Medications / Past Medical History / Surgical History / Social History / Family History    PROBLEM LIST:    Patient Active Problem List   Diagnosis   • Essential hypertension   • Irritable bowel syndrome   • Gout   • Reduced libido   • Carpal tunnel syndrome   • Abdominal hernia   • Osteoporosis   • Other specific arthropathies, not elsewhere classified, unspecified site       PAST MEDICAL HX:    Past Medical History   Diagnosis Date   • Adverse effect of anesthesia      PATIENT STATES WAKES UP ANGRY & COMBATIVE   • Arthritis    • Diverticulosis    • Gout    • Hiatal hernia    • Hypertension    • IBS (irritable bowel syndrome)        PAST SURGICAL HX:    Past Surgical History   Procedure Laterality Date   • Extensor tendon of forearm / wrist repair Bilateral    • Hernia repair       BILATERAL INGUINAL   • Umbilical hernia repair N/A 1/16/2017     Procedure: UMBILICAL HERNIA REPAIR;  Surgeon: Fernando Montes Jr., MD;  Location: [facility] OR;  Service:        SOCIAL HX:    Social History     Social History   • Marital status:      Spouse name: N/A   •  Number of children: N/A   • Years of education: N/A     Social History Main Topics   • Smoking status: Former Smoker     Packs/day: 1.50     Years: 20.00     Types: Cigarettes     Quit date: 9/7/2016   • Smokeless tobacco: Never Used   • Alcohol use Yes      Comment: 1 GLASS WINE PER NIGHT   • Drug use: No   • Sexual activity: Defer     Other Topics Concern   • None     Social History Narrative       FAMILY HX:    Family History   Problem Relation Age of Onset   • No Known Problems Mother    • No Known Problems Father          **Dragon Disclaimer:   Much of this encounter note is an electronic transcription/translation of spoken language to printed text. The electronic translation of spoken language may permit erroneous, or at times, nonsensical words or phrases to be inadvertently transcribed. Although I have reviewed the note for such errors, some may still exist.    Hydroxyzine Counseling: Patient advised that the medication is sedating and not to drive a car after taking this medication.  Patient informed of potential adverse effects including but not limited to dry mouth, urinary retention, and blurry vision.  The patient verbalized understanding of the proper use and possible adverse effects of hydroxyzine.  All of the patient's questions and concerns were addressed.

## 2023-08-25 DIAGNOSIS — I10 ESSENTIAL HYPERTENSION: ICD-10-CM

## 2023-08-25 RX ORDER — ENALAPRIL MALEATE 5 MG/1
5 TABLET ORAL DAILY
Qty: 90 TABLET | Refills: 1 | OUTPATIENT
Start: 2023-08-25

## 2023-08-25 NOTE — TELEPHONE ENCOUNTER
Rx Refill Note  Requested Prescriptions     Pending Prescriptions Disp Refills    enalapril (VASOTEC) 5 MG tablet [Pharmacy Med Name: ENALAPRIL 5MG TABLETS] 90 tablet 1     Sig: TAKE 1 TABLET BY MOUTH DAILY      Last office visit with prescribing clinician: 3/23/2022   Last telemedicine visit with prescribing clinician: Visit date not found   Next office visit with prescribing clinician: Visit date not found                         Would you like a call back once the refill request has been completed: [] Yes [] No    If the office needs to give you a call back, can they leave a voicemail: [] Yes [] No    Sole Tate MA  08/25/23, 08:03 EDT

## (undated) DEVICE — SUT MNCRYL PLS ANTIB UD 4/0 PS2 18IN

## (undated) DEVICE — PREP SOL POVIDONE/IODINE BT 4OZ

## (undated) DEVICE — SOL NACL 0.9PCT 100ML SGL

## (undated) DEVICE — LOU MINOR PROCEDURE: Brand: MEDLINE INDUSTRIES, INC.

## (undated) DEVICE — GLV SURG SENSICARE PI PF LF 7 GRN STRL

## (undated) DEVICE — 3M™ STERI-STRIP™ REINFORCED ADHESIVE SKIN CLOSURES, R1547, 1/2 IN X 4 IN (12 MM X 100 MM), 6 STRIPS/ENVELOPE: Brand: 3M™ STERI-STRIP™

## (undated) DEVICE — DRSNG BURN ACTICOAT FLEX 7 1X24IN

## (undated) DEVICE — SUT VIC 0 CT1 36IN J946H

## (undated) DEVICE — NDL HYPO PRECISIONGLIDE REG 25G 1 1/2

## (undated) DEVICE — PICO 7 DUAL 15X20CM: Brand: PICO™ 7

## (undated) DEVICE — ANTIBACTERIAL UNDYED BRAIDED (POLYGLACTIN 910), SYNTHETIC ABSORBABLE SUTURE: Brand: COATED VICRYL

## (undated) DEVICE — SUT VIC 1 CT1 36IN J947H

## (undated) DEVICE — HANDPIECE SET WITH COAXIAL HIGH FLOW TIP AND SUCTION TUBE: Brand: INTERPULSE

## (undated) DEVICE — ENCORE® LATEX ORTHO SIZE 7.5, STERILE LATEX POWDER-FREE SURGICAL GLOVE: Brand: ENCORE

## (undated) DEVICE — 3M™ STERI-STRIP™ COMPOUND BENZOIN TINCTURE 40 BAGS/CARTON 4 CARTONS/CASE C1544: Brand: 3M™ STERI-STRIP™

## (undated) DEVICE — PK HIP TOTL 40

## (undated) DEVICE — SPNG GZ WOVN 4X4IN 12PLY 10/BX STRL

## (undated) DEVICE — SUT VIC 3/0 TIES 18IN J110T

## (undated) DEVICE — GLV SURG SENSICARE W/ALOE PF LF 7.5 STRL

## (undated) DEVICE — REFLECTION FLEXIBLE DRILL 25MM: Brand: REFLECTION

## (undated) DEVICE — GLV SURG SENSICARE W/ALOE PF LF 8 STRL

## (undated) DEVICE — IRRIGATOR BULB ASEPTO 60CC STRL

## (undated) DEVICE — SUT PDS O CT1 CR/8 18IN Z740D

## (undated) DEVICE — 3M™ IOBAN™ 2 ANTIMICROBIAL INCISE DRAPE 6650EZ: Brand: IOBAN™ 2

## (undated) DEVICE — SUT PDS 1 CT1 36IN Z347H

## (undated) DEVICE — GLV SURG SENSICARE MICRO PF LF 7 STRL

## (undated) DEVICE — DRSNG SURESITE WNDW 4X4.5

## (undated) DEVICE — MAT FLR ABSORBENT LG 4FT 10 2.5FT

## (undated) DEVICE — 3M™ IOBAN™ 2 ANTIMICROBIAL INCISE DRAPE 6640EZ: Brand: IOBAN™ 2

## (undated) DEVICE — APPL DURAPREP IODOPHOR APL 26ML

## (undated) DEVICE — SYR LUERLOK 30CC

## (undated) DEVICE — APPL CHLORAPREP W/TINT 26ML ORNG

## (undated) DEVICE — PREMIUM WET SKIN PREP TRAY: Brand: MEDLINE INDUSTRIES, INC.

## (undated) DEVICE — DRSNG GZ PETROLTM XEROFORM CURAD 1X8IN STRL